# Patient Record
Sex: FEMALE | Race: WHITE | NOT HISPANIC OR LATINO | Employment: FULL TIME | URBAN - METROPOLITAN AREA
[De-identification: names, ages, dates, MRNs, and addresses within clinical notes are randomized per-mention and may not be internally consistent; named-entity substitution may affect disease eponyms.]

---

## 2023-05-13 ENCOUNTER — HOSPITAL ENCOUNTER (OUTPATIENT)
Dept: DATA CONVERSION | Facility: HOSPITAL | Age: 43
End: 2023-05-13

## 2023-06-19 PROBLEM — I10 HYPERTENSION, ESSENTIAL, BENIGN: Status: ACTIVE | Noted: 2023-06-19

## 2023-06-19 PROBLEM — N32.81 OAB (OVERACTIVE BLADDER): Status: RESOLVED | Noted: 2023-06-19 | Resolved: 2023-06-19

## 2023-06-19 PROBLEM — G56.03 BILATERAL CARPAL TUNNEL SYNDROME: Status: RESOLVED | Noted: 2023-06-19 | Resolved: 2023-06-19

## 2023-06-19 PROBLEM — M76.822 POSTERIOR TIBIAL TENDINITIS OF LEFT LEG: Status: ACTIVE | Noted: 2023-06-19

## 2023-06-19 PROBLEM — M25.539 PAIN, WRIST JOINT: Status: RESOLVED | Noted: 2023-06-19 | Resolved: 2023-06-19

## 2023-06-19 PROBLEM — M25.579 ANKLE PAIN: Status: RESOLVED | Noted: 2023-06-19 | Resolved: 2023-06-19

## 2023-06-19 PROBLEM — M24.075 LOOSE BODY IN LEFT ANKLE AND FOOT JOINT: Status: RESOLVED | Noted: 2023-06-19 | Resolved: 2023-06-19

## 2023-06-19 PROBLEM — N92.0 MENORRHAGIA: Status: ACTIVE | Noted: 2023-06-19

## 2023-06-19 PROBLEM — R11.0 NAUSEA: Status: RESOLVED | Noted: 2023-06-19 | Resolved: 2023-06-19

## 2023-06-19 PROBLEM — M25.472 EFFUSION OF LEFT ANKLE: Status: ACTIVE | Noted: 2023-06-19

## 2023-06-19 PROBLEM — M17.0 PRIMARY OSTEOARTHRITIS OF BOTH KNEES: Status: ACTIVE | Noted: 2023-06-19

## 2023-06-19 PROBLEM — E53.8 VITAMIN B12 DEFICIENCY: Status: ACTIVE | Noted: 2023-06-19

## 2023-06-19 PROBLEM — H52.13 MYOPIA OF BOTH EYES: Status: ACTIVE | Noted: 2023-06-19

## 2023-06-19 PROBLEM — N39.0 URINARY TRACT INFECTION: Status: RESOLVED | Noted: 2023-06-19 | Resolved: 2023-06-19

## 2023-06-19 PROBLEM — E83.42 HYPOMAGNESEMIA: Status: ACTIVE | Noted: 2023-06-19

## 2023-06-19 PROBLEM — E66.3 OVERWEIGHT: Status: ACTIVE | Noted: 2023-06-19

## 2023-06-19 PROBLEM — M24.072 LOOSE BODY IN LEFT ANKLE AND FOOT JOINT: Status: RESOLVED | Noted: 2023-06-19 | Resolved: 2023-06-19

## 2023-06-19 PROBLEM — N31.8 HYPERACTIVITY OF BLADDER: Status: ACTIVE | Noted: 2023-06-19

## 2023-06-19 PROBLEM — R07.9 CHEST PAIN: Status: RESOLVED | Noted: 2023-06-19 | Resolved: 2023-06-19

## 2023-06-19 PROBLEM — J20.9 ACUTE BRONCHITIS: Status: RESOLVED | Noted: 2023-06-19 | Resolved: 2023-06-19

## 2023-06-19 PROBLEM — J06.9 ACUTE UPPER RESPIRATORY INFECTION: Status: RESOLVED | Noted: 2023-06-19 | Resolved: 2023-06-19

## 2023-06-19 PROBLEM — E78.5 DYSLIPIDEMIA: Status: ACTIVE | Noted: 2023-06-19

## 2023-06-19 PROBLEM — R35.0 URINARY FREQUENCY: Status: RESOLVED | Noted: 2023-06-19 | Resolved: 2023-06-19

## 2023-06-19 PROBLEM — M25.872 ANKLE IMPINGEMENT SYNDROME, LEFT: Status: RESOLVED | Noted: 2023-06-19 | Resolved: 2023-06-19

## 2023-06-19 PROBLEM — R03.0 ELEVATED BLOOD PRESSURE READING: Status: ACTIVE | Noted: 2023-06-19

## 2023-06-19 PROBLEM — J01.10 ACUTE FRONTAL SINUSITIS: Status: RESOLVED | Noted: 2023-06-19 | Resolved: 2023-06-19

## 2023-06-19 PROBLEM — E55.9 VITAMIN D DEFICIENCY: Status: ACTIVE | Noted: 2023-06-19

## 2023-06-19 PROBLEM — M77.8 LEFT WRIST TENDINITIS: Status: ACTIVE | Noted: 2023-06-19

## 2023-06-19 PROBLEM — R53.83 FATIGUE: Status: ACTIVE | Noted: 2023-06-19

## 2023-06-19 RX ORDER — ATORVASTATIN CALCIUM 10 MG/1
1 TABLET, FILM COATED ORAL DAILY
COMMUNITY
Start: 2022-07-15 | End: 2024-02-22 | Stop reason: SDUPTHER

## 2023-06-19 RX ORDER — HYDROCHLOROTHIAZIDE 25 MG/1
1 TABLET ORAL DAILY
COMMUNITY
Start: 2020-10-27 | End: 2023-08-12 | Stop reason: ALTCHOICE

## 2023-06-19 RX ORDER — DROSPIRENONE 4 MG/1
1 TABLET, FILM COATED ORAL NIGHTLY
COMMUNITY
Start: 2020-10-27 | End: 2024-01-03

## 2023-07-20 ENCOUNTER — APPOINTMENT (OUTPATIENT)
Dept: PRIMARY CARE | Facility: CLINIC | Age: 43
End: 2023-07-20
Payer: COMMERCIAL

## 2023-08-10 ENCOUNTER — OFFICE VISIT (OUTPATIENT)
Dept: PRIMARY CARE | Facility: CLINIC | Age: 43
End: 2023-08-10
Payer: COMMERCIAL

## 2023-08-10 VITALS
HEIGHT: 62 IN | HEART RATE: 62 BPM | DIASTOLIC BLOOD PRESSURE: 86 MMHG | BODY MASS INDEX: 35.33 KG/M2 | SYSTOLIC BLOOD PRESSURE: 139 MMHG | WEIGHT: 192 LBS

## 2023-08-10 DIAGNOSIS — M70.42 PREPATELLAR BURSITIS OF BOTH KNEES: ICD-10-CM

## 2023-08-10 DIAGNOSIS — R03.0 ELEVATED BLOOD PRESSURE READING: ICD-10-CM

## 2023-08-10 DIAGNOSIS — Z12.31 VISIT FOR SCREENING MAMMOGRAM: ICD-10-CM

## 2023-08-10 DIAGNOSIS — M70.41 PREPATELLAR BURSITIS OF BOTH KNEES: ICD-10-CM

## 2023-08-10 DIAGNOSIS — N32.81 OVERACTIVE BLADDER: ICD-10-CM

## 2023-08-10 DIAGNOSIS — Z00.00 ROUTINE GENERAL MEDICAL EXAMINATION AT A HEALTH CARE FACILITY: Primary | ICD-10-CM

## 2023-08-10 PROCEDURE — 3075F SYST BP GE 130 - 139MM HG: CPT | Performed by: FAMILY MEDICINE

## 2023-08-10 PROCEDURE — 99396 PREV VISIT EST AGE 40-64: CPT | Performed by: FAMILY MEDICINE

## 2023-08-10 PROCEDURE — 3079F DIAST BP 80-89 MM HG: CPT | Performed by: FAMILY MEDICINE

## 2023-08-10 PROCEDURE — 1036F TOBACCO NON-USER: CPT | Performed by: FAMILY MEDICINE

## 2023-08-10 RX ORDER — LIDOCAINE 50 MG/G
2 PATCH TOPICAL DAILY
Qty: 60 PATCH | Refills: 2 | Status: SHIPPED | OUTPATIENT
Start: 2023-08-10 | End: 2024-08-09

## 2023-08-10 RX ORDER — MELOXICAM 15 MG/1
15 TABLET ORAL DAILY
Qty: 30 TABLET | Refills: 0 | Status: SHIPPED | OUTPATIENT
Start: 2023-08-10 | End: 2023-09-07

## 2023-08-10 RX ORDER — VALSARTAN 80 MG/1
80 TABLET ORAL DAILY
Qty: 90 TABLET | Refills: 1 | Status: SHIPPED | OUTPATIENT
Start: 2023-08-10 | End: 2024-01-25

## 2023-08-10 ASSESSMENT — PATIENT HEALTH QUESTIONNAIRE - PHQ9
1. LITTLE INTEREST OR PLEASURE IN DOING THINGS: NOT AT ALL
SUM OF ALL RESPONSES TO PHQ9 QUESTIONS 1 AND 2: 0
2. FEELING DOWN, DEPRESSED OR HOPELESS: NOT AT ALL

## 2023-08-11 ENCOUNTER — CLINICAL SUPPORT (OUTPATIENT)
Dept: PRIMARY CARE | Facility: CLINIC | Age: 43
End: 2023-08-11
Payer: COMMERCIAL

## 2023-08-11 DIAGNOSIS — E55.9 VITAMIN D DEFICIENCY: ICD-10-CM

## 2023-08-11 DIAGNOSIS — E78.5 DYSLIPIDEMIA: ICD-10-CM

## 2023-08-11 DIAGNOSIS — I10 HYPERTENSION, ESSENTIAL, BENIGN: ICD-10-CM

## 2023-08-11 DIAGNOSIS — Z00.00 HEALTH CARE MAINTENANCE: ICD-10-CM

## 2023-08-11 LAB
ALANINE AMINOTRANSFERASE (SGPT) (U/L) IN SER/PLAS: 18 U/L (ref 7–45)
ALBUMIN (G/DL) IN SER/PLAS: 4.3 G/DL (ref 3.4–5)
ALKALINE PHOSPHATASE (U/L) IN SER/PLAS: 53 U/L (ref 33–110)
ANION GAP IN SER/PLAS: 11 MMOL/L (ref 10–20)
ASPARTATE AMINOTRANSFERASE (SGOT) (U/L) IN SER/PLAS: 11 U/L (ref 9–39)
BILIRUBIN TOTAL (MG/DL) IN SER/PLAS: 0.5 MG/DL (ref 0–1.2)
CALCIDIOL (25 OH VITAMIN D3) (NG/ML) IN SER/PLAS: 30 NG/ML
CALCIUM (MG/DL) IN SER/PLAS: 9.4 MG/DL (ref 8.6–10.6)
CARBON DIOXIDE, TOTAL (MMOL/L) IN SER/PLAS: 27 MMOL/L (ref 21–32)
CHLORIDE (MMOL/L) IN SER/PLAS: 106 MMOL/L (ref 98–107)
CHOLESTEROL (MG/DL) IN SER/PLAS: 211 MG/DL (ref 0–199)
CHOLESTEROL IN HDL (MG/DL) IN SER/PLAS: 63.2 MG/DL
CHOLESTEROL/HDL RATIO: 3.3
CREATININE (MG/DL) IN SER/PLAS: 0.66 MG/DL (ref 0.5–1.05)
ERYTHROCYTE DISTRIBUTION WIDTH (RATIO) BY AUTOMATED COUNT: 13.8 % (ref 11.5–14.5)
ERYTHROCYTE MEAN CORPUSCULAR HEMOGLOBIN CONCENTRATION (G/DL) BY AUTOMATED: 31.5 G/DL (ref 32–36)
ERYTHROCYTE MEAN CORPUSCULAR VOLUME (FL) BY AUTOMATED COUNT: 99 FL (ref 80–100)
ERYTHROCYTES (10*6/UL) IN BLOOD BY AUTOMATED COUNT: 4.53 X10E12/L (ref 4–5.2)
GFR FEMALE: >90 ML/MIN/1.73M2
GLUCOSE (MG/DL) IN SER/PLAS: 87 MG/DL (ref 74–99)
HEMATOCRIT (%) IN BLOOD BY AUTOMATED COUNT: 44.7 % (ref 36–46)
HEMOGLOBIN (G/DL) IN BLOOD: 14.1 G/DL (ref 12–16)
LDL: 125 MG/DL (ref 0–99)
LEUKOCYTES (10*3/UL) IN BLOOD BY AUTOMATED COUNT: 6.4 X10E9/L (ref 4.4–11.3)
NRBC (PER 100 WBCS) BY AUTOMATED COUNT: 0 /100 WBC (ref 0–0)
PLATELETS (10*3/UL) IN BLOOD AUTOMATED COUNT: 323 X10E9/L (ref 150–450)
POTASSIUM (MMOL/L) IN SER/PLAS: 4.3 MMOL/L (ref 3.5–5.3)
PROTEIN TOTAL: 7.1 G/DL (ref 6.4–8.2)
SODIUM (MMOL/L) IN SER/PLAS: 140 MMOL/L (ref 136–145)
THYROTROPIN (MIU/L) IN SER/PLAS BY DETECTION LIMIT <= 0.05 MIU/L: 1.82 MIU/L (ref 0.44–3.98)
TRIGLYCERIDE (MG/DL) IN SER/PLAS: 115 MG/DL (ref 0–149)
UREA NITROGEN (MG/DL) IN SER/PLAS: 13 MG/DL (ref 6–23)
VLDL: 23 MG/DL (ref 0–40)

## 2023-08-11 PROCEDURE — 80053 COMPREHEN METABOLIC PANEL: CPT

## 2023-08-11 PROCEDURE — 80061 LIPID PANEL: CPT

## 2023-08-11 PROCEDURE — 84443 ASSAY THYROID STIM HORMONE: CPT

## 2023-08-11 PROCEDURE — 85027 COMPLETE CBC AUTOMATED: CPT

## 2023-08-11 PROCEDURE — 82306 VITAMIN D 25 HYDROXY: CPT

## 2023-08-12 PROBLEM — N32.81 OVERACTIVE BLADDER: Status: ACTIVE | Noted: 2023-08-12

## 2023-08-12 PROBLEM — Z12.31 VISIT FOR SCREENING MAMMOGRAM: Status: ACTIVE | Noted: 2023-08-12

## 2023-08-12 PROBLEM — M70.42 PREPATELLAR BURSITIS OF BOTH KNEES: Status: ACTIVE | Noted: 2023-08-12

## 2023-08-12 PROBLEM — M70.41 PREPATELLAR BURSITIS OF BOTH KNEES: Status: ACTIVE | Noted: 2023-08-12

## 2023-08-12 PROBLEM — Z00.00 ROUTINE GENERAL MEDICAL EXAMINATION AT A HEALTH CARE FACILITY: Status: ACTIVE | Noted: 2023-08-12

## 2023-08-12 NOTE — PROGRESS NOTES
"  Subjective     Patient ID: Keiry Kline is a 43 y.o. female who presents for Annual Exam.      Last Physical :  1Years ago     Pt's PMH, PSH, SH, FH , meds and allergies was obtained / reviewed and updated .     Dental visits : Y  Vision issues : N  Hearing issues : N    Immunizations : Y    Diet :  could be better  Exercise:  Weight concerns : yes    Alcohol: as noted in SH  Tobacco: as noted in SH  Recreational drug use : None/ as noted in SH    Sexually active : Active   Contraception :   Menstrual problems:  Premenopausal/perimenopausal/ postmenopausal:    G:  Parity:  Full term:    Premature:   (s):   Living :  Ab induced:   Ab spontaneous :  Ectopic :   Multiple :    PAP smear :  Mammogram : Ordered  Colonoscopy:    Metabolic screening   - Lipid   - Glucose  ======================================================    Visit Vitals  Blood Pressure 139/86   Pulse 62   Height 1.575 m (5' 2\")   Weight 87.1 kg (192 lb)   Body Mass Index 35.12 kg/m²   Smoking Status Never   Body Surface Area 1.95 m²      No LMP recorded.     =====================================    Review of systems:  Constitutional: no chills, no fever and no night sweats.     Eyes: no blurred vision and no eyesight problems.     ENT: no hearing loss, no nasal congestion, no nasal discharge, no hoarseness and no sore throat.     Cardiovascular: no chest pain, no intermittent leg claudication, no lower extremity edema, no palpitations and no syncope.     Respiratory: no cough, no shortness of breath during exertion, no shortness of breath at rest and no wheezing.     Gastrointestinal: no abdominal pain, no blood in stools, no constipation, no diarrhea, no melena, no nausea, no rectal pain and no vomiting.     Genitourinary: no dysuria, no change in urinary frequency, no urinary hesitancy, no feelings of urinary urgency and no vaginal discharge.     Musculoskeletal: no arthralgias, no back pain and no myalgias.     Integumentary: no new " skin lesions and no rashes.     Neurological: no difficulty walking, no headache, no limb weakness, no numbness and no tingling.     Psychiatric: no anxiety, no depression, no anhedonia and no substance use disorders.   ============================================================    Physical exam :    Constitutional: Alert and in no acute distress. Well developed, well nourished.     Eyes: Normal external exam. Pupils were equal in size, round, reactive to light (PERRL) with normal accommodation and extraocular movements intact (EOMI).     Ears, Nose, Mouth, and Throat: External inspection of ears and nose: Normal.  Otoscopic examination: Normal.      Neck: No neck mass was observed. Supple.     Cardiovascular: Heart rate and rhythm were normal, normal S1 and S2, no gallops, no murmurs and no pericardial rub    Pulmonary: No respiratory distress. Clear bilateral breath sounds.     Abdomen: Soft nontender; no abdominal mass palpated. No organomegaly.     Musculoskeletal: No joint swelling seen, normal movements of all extremities. Range of motion: Normal.  Muscle strength/tone: Normal.      Skin: Normal skin color and pigmentation, normal skin turgor, and no rash.     Neurologic: Deep tendon reflexes were 2+ and symmetric. Sensation: Normal.     Psychiatric: Judgment and insight: Intact. Mood and affect: Normal.    Lymphatic : Cervical/ axillary/ groin Lns Palpable/ non palpable     Endocrine: no recent weight gain and no recent weight loss.     Hematologic/Lymphatic: no tendency for easy bruising and no swollen glands.          All other systems have been reviewed and are negative for complaint.      Assessment/Plan    Problem List Items Addressed This Visit       Elevated blood pressure reading    Relevant Medications    valsartan (Diovan) 80 mg tablet    Visit for screening mammogram    Relevant Orders    BI mammo bilateral screening tomosynthesis    Prepatellar bursitis of both knees    Relevant Medications     meloxicam (Mobic) 15 mg tablet    lidocaine (Lidoderm) 5 % patch    Routine general medical examination at a health care facility - Primary     -Nutrition: Stressed importance of moderation in sodium/caffeine intake, saturated fat and cholesterol, caloric balance, sufficient intake of fresh fruits, vegetables, fiber, ---Exercise: Stressed the importance of regular exercise.   --Injury prevention: Discussed safety belts, safety helmets, smoke detector, smoking near bedding or upholstery.   --Dental health: Discussed importance of regular tooth brushing, flossing, and dental visits.  --Immunizations reviewed.  --Discussed benefits of screening colonoscopy.  --After hours service discussed with patient           Overactive bladder     Patient was given samples of Myrbetriq 25 mg daily for 2 weeks and then 50 mg daily

## 2023-08-14 ENCOUNTER — TELEPHONE (OUTPATIENT)
Dept: PRIMARY CARE | Facility: CLINIC | Age: 43
End: 2023-08-14
Payer: COMMERCIAL

## 2023-08-23 ENCOUNTER — TELEPHONE (OUTPATIENT)
Dept: PRIMARY CARE | Facility: CLINIC | Age: 43
End: 2023-08-23
Payer: COMMERCIAL

## 2023-09-07 DIAGNOSIS — M70.42 PREPATELLAR BURSITIS OF BOTH KNEES: ICD-10-CM

## 2023-09-07 DIAGNOSIS — M70.41 PREPATELLAR BURSITIS OF BOTH KNEES: ICD-10-CM

## 2023-09-07 RX ORDER — MELOXICAM 15 MG/1
15 TABLET ORAL DAILY
Qty: 90 TABLET | Refills: 0 | Status: SHIPPED | OUTPATIENT
Start: 2023-09-07 | End: 2023-11-13

## 2023-10-05 ENCOUNTER — ANCILLARY PROCEDURE (OUTPATIENT)
Dept: RADIOLOGY | Facility: CLINIC | Age: 43
End: 2023-10-05
Payer: COMMERCIAL

## 2023-10-05 DIAGNOSIS — Z12.31 VISIT FOR SCREENING MAMMOGRAM: ICD-10-CM

## 2023-10-05 PROCEDURE — 77067 SCR MAMMO BI INCL CAD: CPT | Mod: BILATERAL PROCEDURE | Performed by: RADIOLOGY

## 2023-10-05 PROCEDURE — 77063 BREAST TOMOSYNTHESIS BI: CPT | Mod: BILATERAL PROCEDURE | Performed by: RADIOLOGY

## 2023-10-05 PROCEDURE — 77067 SCR MAMMO BI INCL CAD: CPT | Mod: 50

## 2023-10-20 ENCOUNTER — DOCUMENTATION (OUTPATIENT)
Dept: OBSTETRICS AND GYNECOLOGY | Facility: CLINIC | Age: 43
End: 2023-10-20
Payer: COMMERCIAL

## 2023-10-20 ENCOUNTER — TELEPHONE (OUTPATIENT)
Dept: OBSTETRICS AND GYNECOLOGY | Facility: CLINIC | Age: 43
End: 2023-10-20
Payer: COMMERCIAL

## 2023-10-20 NOTE — PROGRESS NOTES
I attempted to place prior auth but need to wait for request from [employer] as my information is not letting me send. Message comes up as patient not found. [employer] rep said they would resend request via fax.

## 2023-10-20 NOTE — TELEPHONE ENCOUNTER
Called [facility] pharmacy at [phone] about prior auth. To verify if it is for rx from 09/2023 or is this for a new rx for pt.  Message left for return call. Pt sent to scheduling for appt last annual was 10/2022, new appt need for further rx after the rx sent 09/2023.

## 2023-10-24 ENCOUNTER — TELEPHONE (OUTPATIENT)
Dept: OBSTETRICS AND GYNECOLOGY | Facility: CLINIC | Age: 43
End: 2023-10-24
Payer: COMMERCIAL

## 2023-10-24 NOTE — TELEPHONE ENCOUNTER
contacted pt  name and  verified  Spoke with pt made aware samples at desk  Prior auth in process  pt verbalized understanding  no further questions or concerns at this time

## 2023-10-24 NOTE — TELEPHONE ENCOUNTER
Called pt, no answer, left voicemail for return call  Dr. Thad lai with pt receiving samples until prior authorization situation is resolved  To get her to appt

## 2023-11-13 DIAGNOSIS — M70.41 PREPATELLAR BURSITIS OF BOTH KNEES: ICD-10-CM

## 2023-11-13 DIAGNOSIS — M70.42 PREPATELLAR BURSITIS OF BOTH KNEES: ICD-10-CM

## 2023-11-13 RX ORDER — MELOXICAM 15 MG/1
15 TABLET ORAL DAILY
Qty: 90 TABLET | Refills: 0 | Status: SHIPPED | OUTPATIENT
Start: 2023-11-13 | End: 2024-02-22 | Stop reason: SDUPTHER

## 2023-11-20 ENCOUNTER — DOCUMENTATION (OUTPATIENT)
Dept: PHYSICAL THERAPY | Facility: CLINIC | Age: 43
End: 2023-11-20
Payer: COMMERCIAL

## 2023-11-20 NOTE — PROGRESS NOTES
Physical Therapy    Discharge Summary    Name: Keiry Kline  MRN: [id number]  : 1980  Date: 23    Discharge Summary: PT    Discharge Information: Date of discharge 23, Date of last visit 23, Date of evaluation 23, Number of attended visits 3, Referred by Jatinder Torrez PA-c, and Referred for B knee pain.    Therapy Summary: Patient was evaluated and given exercises to address c/o B knee pain.    Discharge Status: At last visit in August we discussed alternating pool and land exercises.     Rehab Discharge Reason: Failed to schedule and/or keep follow-up appointment(s)

## 2023-12-21 DIAGNOSIS — Z30.41 ENCOUNTER FOR SURVEILLANCE OF CONTRACEPTIVE PILLS: Primary | ICD-10-CM

## 2023-12-29 ENCOUNTER — APPOINTMENT (OUTPATIENT)
Dept: OBSTETRICS AND GYNECOLOGY | Facility: HOSPITAL | Age: 43
End: 2023-12-29
Payer: COMMERCIAL

## 2024-01-03 RX ORDER — DROSPIRENONE 4 MG/1
TABLET, FILM COATED ORAL
Qty: 84 TABLET | Refills: 0 | Status: SHIPPED | OUTPATIENT
Start: 2024-01-03

## 2024-01-25 DIAGNOSIS — R03.0 ELEVATED BLOOD PRESSURE READING: ICD-10-CM

## 2024-01-25 RX ORDER — VALSARTAN 80 MG/1
80 TABLET ORAL DAILY
Qty: 90 TABLET | Refills: 1 | Status: SHIPPED | OUTPATIENT
Start: 2024-01-25

## 2024-02-09 ENCOUNTER — OFFICE VISIT (OUTPATIENT)
Dept: OBSTETRICS AND GYNECOLOGY | Facility: HOSPITAL | Age: 44
End: 2024-02-09
Payer: COMMERCIAL

## 2024-02-09 VITALS
SYSTOLIC BLOOD PRESSURE: 122 MMHG | WEIGHT: 187 LBS | BODY MASS INDEX: 34.41 KG/M2 | RESPIRATION RATE: 12 BRPM | DIASTOLIC BLOOD PRESSURE: 80 MMHG | HEART RATE: 84 BPM | HEIGHT: 62 IN | OXYGEN SATURATION: 99 %

## 2024-02-09 DIAGNOSIS — Z30.41 ENCOUNTER FOR SURVEILLANCE OF CONTRACEPTIVE PILLS: Primary | ICD-10-CM

## 2024-02-09 DIAGNOSIS — Z12.4 SCREENING FOR MALIGNANT NEOPLASM OF CERVIX: ICD-10-CM

## 2024-02-09 DIAGNOSIS — Z01.419 WELL WOMAN EXAM WITH ROUTINE GYNECOLOGICAL EXAM: ICD-10-CM

## 2024-02-09 DIAGNOSIS — Z12.31 ENCOUNTER FOR SCREENING MAMMOGRAM FOR MALIGNANT NEOPLASM OF BREAST: ICD-10-CM

## 2024-02-09 PROCEDURE — 99396 PREV VISIT EST AGE 40-64: CPT | Performed by: OBSTETRICS & GYNECOLOGY

## 2024-02-09 PROCEDURE — 1036F TOBACCO NON-USER: CPT | Performed by: OBSTETRICS & GYNECOLOGY

## 2024-02-09 PROCEDURE — 3074F SYST BP LT 130 MM HG: CPT | Performed by: OBSTETRICS & GYNECOLOGY

## 2024-02-09 PROCEDURE — 3079F DIAST BP 80-89 MM HG: CPT | Performed by: OBSTETRICS & GYNECOLOGY

## 2024-02-09 PROCEDURE — 88175 CYTOPATH C/V AUTO FLUID REDO: CPT | Mod: TC | Performed by: OBSTETRICS & GYNECOLOGY

## 2024-02-09 PROCEDURE — 87624 HPV HI-RISK TYP POOLED RSLT: CPT | Performed by: OBSTETRICS & GYNECOLOGY

## 2024-02-09 SDOH — ECONOMIC STABILITY: HOUSING INSECURITY
IN THE LAST 12 MONTHS, WAS THERE A TIME WHEN YOU DID NOT HAVE A STEADY PLACE TO SLEEP OR SLEPT IN A SHELTER (INCLUDING NOW)?: NO

## 2024-02-09 SDOH — ECONOMIC STABILITY: INCOME INSECURITY: IN THE LAST 12 MONTHS, WAS THERE A TIME WHEN YOU WERE NOT ABLE TO PAY THE MORTGAGE OR RENT ON TIME?: NO

## 2024-02-09 SDOH — ECONOMIC STABILITY: FOOD INSECURITY: WITHIN THE PAST 12 MONTHS, THE FOOD YOU BOUGHT JUST DIDN'T LAST AND YOU DIDN'T HAVE MONEY TO GET MORE.: NEVER TRUE

## 2024-02-09 SDOH — ECONOMIC STABILITY: FOOD INSECURITY: WITHIN THE PAST 12 MONTHS, YOU WORRIED THAT YOUR FOOD WOULD RUN OUT BEFORE YOU GOT MONEY TO BUY MORE.: NEVER TRUE

## 2024-02-09 ASSESSMENT — ENCOUNTER SYMPTOMS
ALLERGIC/IMMUNOLOGIC NEGATIVE: 0
MUSCULOSKELETAL NEGATIVE: 0
EYES NEGATIVE: 0
RESPIRATORY NEGATIVE: 0
ENDOCRINE NEGATIVE: 0
GASTROINTESTINAL NEGATIVE: 0
HEMATOLOGIC/LYMPHATIC NEGATIVE: 0
PSYCHIATRIC NEGATIVE: 0
CARDIOVASCULAR NEGATIVE: 0
NEUROLOGICAL NEGATIVE: 0
CONSTITUTIONAL NEGATIVE: 0

## 2024-02-09 ASSESSMENT — PAIN SCALES - GENERAL: PAINLEVEL: 0-NO PAIN

## 2024-02-09 ASSESSMENT — LIFESTYLE VARIABLES
HOW MANY STANDARD DRINKS CONTAINING ALCOHOL DO YOU HAVE ON A TYPICAL DAY: PATIENT DOES NOT DRINK
HOW OFTEN DO YOU HAVE SIX OR MORE DRINKS ON ONE OCCASION: NEVER
HOW OFTEN DO YOU HAVE A DRINK CONTAINING ALCOHOL: NEVER
AUDIT-C TOTAL SCORE: 0
SKIP TO QUESTIONS 9-10: 1

## 2024-02-09 ASSESSMENT — PATIENT HEALTH QUESTIONNAIRE - PHQ9
2. FEELING DOWN, DEPRESSED OR HOPELESS: NOT AT ALL
SUM OF ALL RESPONSES TO PHQ9 QUESTIONS 1 AND 2: 0
1. LITTLE INTEREST OR PLEASURE IN DOING THINGS: NOT AT ALL

## 2024-02-09 NOTE — PROGRESS NOTES
Subjective   Patient ID: Keiry Kline is a 44 y.o. female who presents for Annual Exam (Last pap 7/29/19/Last mamm 10/5/2023/).  Pt needs a PAP smear  Pt has irregular menses      Review of Systems   All other systems reviewed and are negative.      Objective   Physical Exam  Constitutional:       Appearance: She is obese.   Pulmonary:      Effort: Pulmonary effort is normal.   Genitourinary:     General: Normal vulva.      Vagina: Normal.      Cervix: Normal.      Uterus: Normal.       Adnexa: Right adnexa normal and left adnexa normal.      Rectum: Normal.   Musculoskeletal:      Cervical back: Neck supple.   Skin:     General: Skin is warm.   Psychiatric:         Mood and Affect: Mood normal.         Behavior: Behavior normal.         Assessment/Plan   Follo wDiagnoses and all orders for this visit:  Encounter for surveillance of contraceptive pills  -     drospirenone, contraceptive, 4 mg (28) tablet; Take 1 tablet by mouth once daily.  Screening for malignant neoplasm of cervix  -     THINPREP PAP TEST  Encounter for screening mammogram for malignant neoplasm of breast  -     BI mammo bilateral screening tomosynthesis; Future  Well woman exam with routine gynecological exam       Follow up  in 12 months     Cherelle Oneil MD 02/09/24 10:42 AM

## 2024-02-22 ENCOUNTER — OFFICE VISIT (OUTPATIENT)
Dept: PRIMARY CARE | Facility: CLINIC | Age: 44
End: 2024-02-22
Payer: COMMERCIAL

## 2024-02-22 VITALS
WEIGHT: 194 LBS | HEART RATE: 56 BPM | DIASTOLIC BLOOD PRESSURE: 81 MMHG | SYSTOLIC BLOOD PRESSURE: 124 MMHG | BODY MASS INDEX: 35.7 KG/M2 | HEIGHT: 62 IN

## 2024-02-22 DIAGNOSIS — M70.41 PREPATELLAR BURSITIS OF BOTH KNEES: ICD-10-CM

## 2024-02-22 DIAGNOSIS — E78.5 DYSLIPIDEMIA: ICD-10-CM

## 2024-02-22 DIAGNOSIS — N32.81 OVERACTIVE BLADDER: Primary | ICD-10-CM

## 2024-02-22 DIAGNOSIS — I10 HYPERTENSION, ESSENTIAL, BENIGN: ICD-10-CM

## 2024-02-22 DIAGNOSIS — Z23 NEED FOR IMMUNIZATION AGAINST INFLUENZA: ICD-10-CM

## 2024-02-22 DIAGNOSIS — M70.42 PREPATELLAR BURSITIS OF BOTH KNEES: ICD-10-CM

## 2024-02-22 PROCEDURE — 1036F TOBACCO NON-USER: CPT | Performed by: FAMILY MEDICINE

## 2024-02-22 PROCEDURE — 90471 IMMUNIZATION ADMIN: CPT | Performed by: FAMILY MEDICINE

## 2024-02-22 PROCEDURE — 90686 IIV4 VACC NO PRSV 0.5 ML IM: CPT | Performed by: FAMILY MEDICINE

## 2024-02-22 PROCEDURE — 99214 OFFICE O/P EST MOD 30 MIN: CPT | Performed by: FAMILY MEDICINE

## 2024-02-22 PROCEDURE — 3079F DIAST BP 80-89 MM HG: CPT | Performed by: FAMILY MEDICINE

## 2024-02-22 PROCEDURE — 3074F SYST BP LT 130 MM HG: CPT | Performed by: FAMILY MEDICINE

## 2024-02-22 RX ORDER — MELOXICAM 15 MG/1
15 TABLET ORAL DAILY
Qty: 90 TABLET | Refills: 0 | Status: SHIPPED | OUTPATIENT
Start: 2024-02-22

## 2024-02-22 RX ORDER — SOLIFENACIN SUCCINATE 10 MG/1
10 TABLET, FILM COATED ORAL DAILY
Qty: 90 TABLET | Refills: 1 | Status: SHIPPED | OUTPATIENT
Start: 2024-02-22 | End: 2025-02-21

## 2024-02-22 RX ORDER — ATORVASTATIN CALCIUM 10 MG/1
10 TABLET, FILM COATED ORAL DAILY
Qty: 90 TABLET | Refills: 1 | Status: SHIPPED | OUTPATIENT
Start: 2024-02-22 | End: 2025-02-21

## 2024-02-22 ASSESSMENT — ENCOUNTER SYMPTOMS
DEPRESSION: 0
LOSS OF SENSATION IN FEET: 0
OCCASIONAL FEELINGS OF UNSTEADINESS: 0

## 2024-02-22 ASSESSMENT — PATIENT HEALTH QUESTIONNAIRE - PHQ9
SUM OF ALL RESPONSES TO PHQ9 QUESTIONS 1 AND 2: 0
2. FEELING DOWN, DEPRESSED OR HOPELESS: NOT AT ALL
1. LITTLE INTEREST OR PLEASURE IN DOING THINGS: NOT AT ALL

## 2024-02-22 NOTE — PROGRESS NOTES
"Subjective   Patient ID: Keiry Kline is a 44 y.o. female who presents for Follow-up.      HPI patient is here for follow-up  Continues to have urinary incontinence medication not helping  Has been taking meloxicam every day knees are getting better  Tolerating statin  Current Outpatient Medications on File Prior to Visit   Medication Sig Dispense Refill    atorvastatin (Lipitor) 10 mg tablet Take 1 tablet (10 mg) by mouth once daily.      drospirenone, contraceptive, 4 mg (28) tablet Take 1 tablet by mouth once daily. 1 tablet 1    lidocaine (Lidoderm) 5 % patch Place 2 patches over 12 hours on the skin once daily. Apply to painful area 12 hours per day, remove for 12 hours. 60 patch 2    meloxicam (Mobic) 15 mg tablet TAKE 1 TABLET(15 MG) BY MOUTH EVERY DAY 90 tablet 0    Slynd 4 mg (28) tablet TAKE ONE (1) TABLET BY MOUTH EVERYDAY AT BEDTIME 84 tablet 0    valsartan (Diovan) 80 mg tablet TAKE 1 TABLET(80 MG) BY MOUTH EVERY DAY 90 tablet 1    [DISCONTINUED] oxybutynin XL (Ditropan-XL) 5 mg 24 hr tablet Take 1 tablet (5 mg) by mouth once daily. 90 tablet 1     No current facility-administered medications on file prior to visit.        Review of Systems   Constitutional:  Negative for chills and fever.   HENT: Negative.     Respiratory: Negative.     Cardiovascular: Negative.    Gastrointestinal: Negative.  Negative for nausea and vomiting.   Endocrine: Negative.    Genitourinary: Negative.    Musculoskeletal: Negative.    Skin: Negative.  Negative for rash.   Allergic/Immunologic: Negative.    Neurological: Negative.    Hematological: Negative.    Psychiatric/Behavioral: Negative.     All other systems reviewed and are negative.      Objective   Blood Pressure 124/81   Pulse 56   Height 1.575 m (5' 2\")   Weight 88 kg (194 lb)   Last Menstrual Period  (LMP Unknown)   Body Mass Index 35.48 kg/m²   BSA: 1.96 meters squared  Growth percentiles: Facility age limit for growth %angela is 20 years. Facility age " limit for growth %angela is 20 years.   No visits with results within 1 Week(s) from this visit.   Latest known visit with results is:   Clinical Support on 08/11/2023   Component Date Value Ref Range Status    Glucose 08/11/2023 87  74 - 99 mg/dL Final    Sodium 08/11/2023 140  136 - 145 mmol/L Final    Potassium 08/11/2023 4.3  3.5 - 5.3 mmol/L Final    Chloride 08/11/2023 106  98 - 107 mmol/L Final    Bicarbonate 08/11/2023 27  21 - 32 mmol/L Final    Anion Gap 08/11/2023 11  10 - 20 mmol/L Final    Urea Nitrogen 08/11/2023 13  6 - 23 mg/dL Final    Creatinine 08/11/2023 0.66  0.50 - 1.05 mg/dL Final    GFR Female 08/11/2023 >90  >90 mL/min/1.73m2 Final     CALCULATIONS OF ESTIMATED GFR ARE PERFORMED   USING THE 2021 CKD-EPI STUDY REFIT EQUATION   WITHOUT THE RACE VARIABLE FOR THE IDMS-TRACEABLE   CREATININE METHODS.    https://jasn.asnjournals.org/content/early/2021/09/22/ASN.2472312549    Calcium 08/11/2023 9.4  8.6 - 10.6 mg/dL Final    Albumin 08/11/2023 4.3  3.4 - 5.0 g/dL Final    Alkaline Phosphatase 08/11/2023 53  33 - 110 U/L Final    Total Protein 08/11/2023 7.1  6.4 - 8.2 g/dL Final    AST 08/11/2023 11  9 - 39 U/L Final    Total Bilirubin 08/11/2023 0.5  0.0 - 1.2 mg/dL Final    ALT (SGPT) 08/11/2023 18  7 - 45 U/L Final     Patients treated with Sulfasalazine may generate    falsely decreased results for ALT.    Cholesterol 08/11/2023 211 (H)  0 - 199 mg/dL Final    .      AGE      DESIRABLE   BORDERLINE HIGH   HIGH     0-19 Y     0 - 169       170 - 199     >/= 200    20-24 Y     0 - 189       190 - 224     >/= 225         >24 Y     0 - 199       200 - 239     >/= 240   **All ranges are based on fasting samples. Specific   therapeutic targets will vary based on patient-specific   cardiac risk.  .   Pediatric guidelines reference:Pediatrics 2011, 128(S5).   Adult guidelines reference: NCEP ATPIII Guidelines,     THERON 2001, 258:2486-86  .   Venipuncture immediately after or during the    administration  of Metamizole may lead to falsely   low results. Testing should be performed immediately   prior to Metamizole dosing.    HDL 08/11/2023 63.2  mg/dL Final    .      AGE      VERY LOW   LOW     NORMAL    HIGH       0-19 Y       < 35   < 40     40-45     ----    20-24 Y       ----   < 40       >45     ----      >24 Y       ----   < 40     40-60      >60  .    Cholesterol/HDL Ratio 08/11/2023 3.3   Final    REF VALUES  DESIRABLE  < 3.4  HIGH RISK  > 5.0    LDL 08/11/2023 125 (H)  0 - 99 mg/dL Final    .                           NEAR      BORD      AGE      DESIRABLE  OPTIMAL    HIGH     HIGH     VERY HIGH     0-19 Y     0 - 109     ---    110-129   >/= 130     ----    20-24 Y     0 - 119     ---    120-159   >/= 160     ----      >24 Y     0 -  99   100-129  130-159   160-189     >/=190  .    VLDL 08/11/2023 23  0 - 40 mg/dL Final    Triglycerides 08/11/2023 115  0 - 149 mg/dL Final    .      AGE      DESIRABLE   BORDERLINE HIGH   HIGH     VERY HIGH   0 D-90 D    19 - 174         ----         ----        ----  91 D- 9 Y     0 -  74        75 -  99     >/= 100      ----    10-19 Y     0 -  89        90 - 129     >/= 130      ----    20-24 Y     0 - 114       115 - 149     >/= 150      ----         >24 Y     0 - 149       150 - 199    200- 499    >/= 500  .   Venipuncture immediately after or during the    administration of Metamizole may lead to falsely   low results. Testing should be performed immediately   prior to Metamizole dosing.    WBC 08/11/2023 6.4  4.4 - 11.3 x10E9/L Final    nRBC 08/11/2023 0.0  0.0 - 0.0 /100 WBC Final    RBC 08/11/2023 4.53  4.00 - 5.20 x10E12/L Final    Hemoglobin 08/11/2023 14.1  12.0 - 16.0 g/dL Final    Hematocrit 08/11/2023 44.7  36.0 - 46.0 % Final    MCV 08/11/2023 99  80 - 100 fL Final    MCHC 08/11/2023 31.5 (L)  32.0 - 36.0 g/dL Final    Platelets 08/11/2023 323  150 - 450 x10E9/L Final    RDW 08/11/2023 13.8  11.5 - 14.5 % Final    TSH 08/11/2023 1.82  0.44 - 3.98 mIU/L Final      TSH testing is performed using different testing    methodology at [facility] than at other    [facility] hospitals. Direct result comparisons should    only be made within the same method.    Vitamin D, 25-Hydroxy 08/11/2023 30  ng/mL Final    .  DEFICIENCY:         < 20   NG/ML  INSUFFICIENCY:      20-29  NG/ML  SUFFICIENCY:         NG/ML    THIS ASSAY ACCURATELY QUANTIFIES THE SUM OF  VITAMIN D3, 25-HYDROXY AND VIT D2,25-HYDROXY.      Physical Exam  Constitutional:       General: She is not in acute distress.  Eyes:      Extraocular Movements: Extraocular movements intact.   Cardiovascular:      Rate and Rhythm: Normal rate and regular rhythm.   Pulmonary:      Breath sounds: Normal breath sounds.   Abdominal:      General: Bowel sounds are normal.   Musculoskeletal:         General: Swelling present.      Cervical back: No rigidity.   Skin:     Findings: No rash.   Neurological:      General: No focal deficit present.      Mental Status: She is alert.   Psychiatric:         Thought Content: Thought content normal.         Assessment/Plan   Problem List Items Addressed This Visit             ICD-10-CM    Dyslipidemia E78.5    Relevant Medications    atorvastatin (Lipitor) 10 mg tablet    Hypertension, essential, benign I10     C/w meds         Prepatellar bursitis of both knees M70.41, M70.42    Relevant Medications    meloxicam (Mobic) 15 mg tablet    Overactive bladder - Primary N32.81    Relevant Medications    solifenacin (Vesicare) 10 mg tablet    Need for immunization against influenza Z23    Relevant Orders    Flu vaccine (IIV4) age 6 months and greater, preservative free (Completed)

## 2024-02-24 PROBLEM — Z23 NEED FOR IMMUNIZATION AGAINST INFLUENZA: Status: ACTIVE | Noted: 2024-02-24

## 2024-02-24 ASSESSMENT — ENCOUNTER SYMPTOMS
FEVER: 0
HEMATOLOGIC/LYMPHATIC NEGATIVE: 1
RESPIRATORY NEGATIVE: 1
MUSCULOSKELETAL NEGATIVE: 1
ENDOCRINE NEGATIVE: 1
CARDIOVASCULAR NEGATIVE: 1
NEUROLOGICAL NEGATIVE: 1
ALLERGIC/IMMUNOLOGIC NEGATIVE: 1
GASTROINTESTINAL NEGATIVE: 1
VOMITING: 0
PSYCHIATRIC NEGATIVE: 1
CHILLS: 0
NAUSEA: 0

## 2024-02-27 LAB
CYTOLOGY CMNT CVX/VAG CYTO-IMP: NORMAL
HPV HR 12 DNA GENITAL QL NAA+PROBE: NEGATIVE
HPV HR GENOTYPES PNL CVX NAA+PROBE: NEGATIVE
HPV16 DNA SPEC QL NAA+PROBE: NEGATIVE
HPV18 DNA SPEC QL NAA+PROBE: NEGATIVE
LAB AP HPV GENOTYPE QUESTION: YES
LAB AP HPV HR: NORMAL
LABORATORY COMMENT REPORT: NORMAL
PATH REPORT.TOTAL CANCER: NORMAL

## 2024-04-05 ENCOUNTER — TELEPHONE (OUTPATIENT)
Dept: OBSTETRICS AND GYNECOLOGY | Facility: CLINIC | Age: 44
End: 2024-04-05
Payer: COMMERCIAL

## 2024-04-08 NOTE — TELEPHONE ENCOUNTER
LM on pt's VM-Slynd sample available for p/up at [facility].  Advised pt contact office with further questions.    Mom called to schedule pt's EEG. Mom confirmed understanding of date and time

## 2024-08-06 ENCOUNTER — TELEPHONE (OUTPATIENT)
Dept: OBSTETRICS AND GYNECOLOGY | Facility: HOSPITAL | Age: 44
End: 2024-08-06
Payer: COMMERCIAL

## 2024-08-06 DIAGNOSIS — Z30.41 ENCOUNTER FOR SURVEILLANCE OF CONTRACEPTIVE PILLS: ICD-10-CM

## 2024-08-07 RX ORDER — DROSPIRENONE 4 MG/1
1 TABLET, FILM COATED ORAL NIGHTLY
Qty: 84 TABLET | Refills: 3 | Status: SHIPPED | OUTPATIENT
Start: 2024-08-07 | End: 2025-08-07

## 2024-11-01 ENCOUNTER — APPOINTMENT (OUTPATIENT)
Dept: OBSTETRICS AND GYNECOLOGY | Facility: HOSPITAL | Age: 44
End: 2024-11-01
Payer: COMMERCIAL

## 2024-12-11 ENCOUNTER — APPOINTMENT (OUTPATIENT)
Dept: ORTHOPEDIC SURGERY | Facility: CLINIC | Age: 44
End: 2024-12-11
Payer: COMMERCIAL

## 2024-12-11 ENCOUNTER — HOSPITAL ENCOUNTER (OUTPATIENT)
Dept: RADIOLOGY | Facility: CLINIC | Age: 44
Discharge: HOME | End: 2024-12-11
Payer: COMMERCIAL

## 2024-12-11 VITALS — HEIGHT: 62 IN | BODY MASS INDEX: 35.7 KG/M2 | WEIGHT: 194 LBS

## 2024-12-11 DIAGNOSIS — M25.561 ACUTE BILATERAL KNEE PAIN: ICD-10-CM

## 2024-12-11 DIAGNOSIS — M17.0 ARTHRITIS OF BOTH KNEES: ICD-10-CM

## 2024-12-11 DIAGNOSIS — M25.562 ACUTE BILATERAL KNEE PAIN: ICD-10-CM

## 2024-12-11 PROCEDURE — 3008F BODY MASS INDEX DOCD: CPT | Performed by: ORTHOPAEDIC SURGERY

## 2024-12-11 PROCEDURE — 73562 X-RAY EXAM OF KNEE 3: CPT | Mod: 50

## 2024-12-11 PROCEDURE — 99203 OFFICE O/P NEW LOW 30 MIN: CPT | Performed by: ORTHOPAEDIC SURGERY

## 2024-12-11 PROCEDURE — 73562 X-RAY EXAM OF KNEE 3: CPT | Mod: BILATERAL PROCEDURE | Performed by: STUDENT IN AN ORGANIZED HEALTH CARE EDUCATION/TRAINING PROGRAM

## 2024-12-11 PROCEDURE — 1036F TOBACCO NON-USER: CPT | Performed by: ORTHOPAEDIC SURGERY

## 2024-12-11 RX ORDER — MELOXICAM 15 MG/1
TABLET ORAL
Qty: 30 TABLET | Refills: 0 | Status: SHIPPED | OUTPATIENT
Start: 2024-12-11

## 2024-12-11 ASSESSMENT — ENCOUNTER SYMPTOMS
KNEE DEFORMITY: 1
KNEE SWELLING: 1

## 2024-12-11 NOTE — PROGRESS NOTES
Subjective    Patient ID: Keiry Kline is a 44 y.o. female.    Chief Complaint: Pain of the Left Knee (NKI/PAIN X SEVERAL MONTHS/+LIMPING +BOWING OF KNEES +G/O ) and Pain of the Right Knee (NKI/PAIN X SEVERAL MONTHS/+LIMPING +BOWING OF KNEES +G/O )       This is a 44-year-old female presents for evaluation of bilateral knee pain.  Pain is somewhat equal in severity right versus left.  This knee pain has been ongoing for the past year and a half.  She does describe an incident that she feels is the onset of her knee pain.  She was running in a 50 medial race and at the end of the race noted sharp stabbing pain along the medial and posterior aspect of each knee.  She did go to see a orthopedic physician assistant following that onset of pain and intra-articular injection of cortisone into each knee were of no significant benefit.  She has noted progressive bowlegged deformity of each knee since that onset of pain and experiences stiffness after prolonged sitting.  Pain limits functions of ADL including standing, walking and stair climbing.    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    Physical Examination  Constitutional: Patient's height and weight reviewed, appears well kempt, moderately obese  Psychiatric: Alert and oriented ×3, appropriate mood and behavior  Pulmonary: Breathing appears nonlabored, no apparent distress  Lymphatic: No appreciable lymphadenopathy to both the upper and lower extremities  Skin: No open lesions, rashes, ulcerations  Neurologic: Gross motor and sensory exam appear intact (except for abnormalities noted in the below muscle skeletal exam)    Musculoskeletal: There appears to be satisfactory range of motion of each hip without groin or thigh pain elicited.  Each knee  though demonstrates a varus alignment of at least 5 to 7 degrees that minimally corrects to valgus stress.  Each knee has a lack extension approximately 5 to 7 degrees with flexion on the right limited to 110 degrees on the left 105 degrees.  Patellofemoral crepitus in each knee throughout a range of motion.  Ambulates with an antalgic gait    X-rays performed today reviewed by myself along with the patient demonstrate advanced Marilyn changes of each knee with complete obliteration medial joint space as well as medial subluxation of the femur in relationship to the tibias.  This is dramatically different than her x-rays which were performed just 18+ months ago.    Assessment: Advanced degenerative arthritic changes of each knee with subluxation    Plan: It is my opinion that little could be offered to this patient with regard to her condition other than knee replacement.  I have suggested referral to one of our local joint specialist.  I have placed her on meloxicam at the present time to potentially bridge her with some help prior to that visit.  I have told her it is my opinion that she go forward with a knee replacement in a timely fashion as I feel her disease process is progressing rapidly and it would be more difficult to do a knee replacement if she delays thoughts with regard to this.    Left Knee     Right Knee           Current Outpatient Medications:     atorvastatin (Lipitor) 10 mg tablet, Take 1 tablet (10 mg) by mouth once daily., Disp: 90 tablet, Rfl: 1    drospirenone, contraceptive, (Slynd) 4 mg (28) tablet, Take 1 tablet by mouth once daily at bedtime., Disp: 84 tablet, Rfl: 3    drospirenone, contraceptive, 4 mg (28) tablet, Take 1 tablet by mouth once daily., Disp: 1 tablet, Rfl: 1    meloxicam (Mobic) 15 mg tablet, Take 1 tablet (15 mg) by mouth once daily., Disp: 90 tablet, Rfl: 0    meloxicam (Mobic) 15 mg tablet, Take one tablet once daily, Disp: 30 tablet, Rfl: 0    solifenacin (Vesicare)  10 mg tablet, Take 1 tablet (10 mg) by mouth once daily. Swallow tablet whole; do not crush, chew, or split., Disp: 90 tablet, Rfl: 1    valsartan (Diovan) 80 mg tablet, TAKE 1 TABLET(80 MG) BY MOUTH EVERY DAY, Disp: 90 tablet, Rfl: 1

## 2024-12-16 ENCOUNTER — HOSPITAL ENCOUNTER (OUTPATIENT)
Facility: HOSPITAL | Age: 44
Setting detail: OUTPATIENT SURGERY
End: 2024-12-16
Attending: ORTHOPAEDIC SURGERY | Admitting: ORTHOPAEDIC SURGERY
Payer: COMMERCIAL

## 2024-12-16 ENCOUNTER — APPOINTMENT (OUTPATIENT)
Facility: CLINIC | Age: 44
End: 2024-12-16
Payer: COMMERCIAL

## 2024-12-16 ENCOUNTER — PREP FOR PROCEDURE (OUTPATIENT)
Facility: CLINIC | Age: 44
End: 2024-12-16

## 2024-12-16 VITALS — BODY MASS INDEX: 36.99 KG/M2 | HEIGHT: 62 IN | WEIGHT: 201 LBS

## 2024-12-16 DIAGNOSIS — M25.362 INSTABILITY OF LEFT KNEE JOINT: ICD-10-CM

## 2024-12-16 DIAGNOSIS — M17.0 PRIMARY OSTEOARTHRITIS OF BOTH KNEES: ICD-10-CM

## 2024-12-16 DIAGNOSIS — M17.0 ARTHRITIS OF BOTH KNEES: Primary | ICD-10-CM

## 2024-12-16 ASSESSMENT — PAIN - FUNCTIONAL ASSESSMENT: PAIN_FUNCTIONAL_ASSESSMENT: 0-10

## 2024-12-16 ASSESSMENT — PAIN SCALES - GENERAL: PAINLEVEL_OUTOF10: 6

## 2024-12-16 NOTE — PROGRESS NOTES
PRIMARY CARE PHYSICIAN: Eusebio Aceves MD  REFERRING PROVIDER: No referring provider defined for this encounter.     CONSULT ORTHOPAEDIC: Knee Evaluation        ASSESSMENT & PLAN    IMPRESSION:   1.  Primary arthritis, bilateral knees    PLAN:   Discussed the patient findings above and reviewed her current x-rays with her.  She has severe arthritis of bilateral knees with varus subluxation and is failed conservative treatment.  Wants to discuss surgical intervention for her left knee. Keiry Kline has radiographic and physical exam evidence of degenerative joint disease and wishes to pursue surgery. The patient appears to have sufficient symptoms to warrant surgical intervention and is an appropriate candidate for  left Total Knee Arthroplasty as evidenced by six months of unsuccessful non-operative treatment as outlined in the HPI, progressive symptoms including pain impacting sleep or causing fatigue, pain impacting work, pain worsened with weight bearing, and pain limiting ability to stay fit and healthy.     We had a lengthy discussion regarding the risk and benefit of surgery, the alternatives, limitations, and personnel involved. The include but are not limited to infection, persistent pain, instability, nerve injury, blood clots, and medical complications. We also discussed the pre-operative course, surgery itself, and rehabilitation. Perioperative blood management and transfusion issues were discussed, and options clearly outlined. The patient consented to the use of allogenic blood if medically necessary.     The patient has elected to schedule surgery at this time or intents to call the office with a surgical date. Shared decision making occurred while obtaining informed consent. The patient with be scheduled for a pre-operative education class. The patient will be ordered appropriate preoperative labs to be completed for preadmission testing.     Robotic Assisted Surgery: Yes. The use of robotic  assisted surgery was discussed with the patient.  The risk, benefits were discussed regarding this.  Patient consented for this procedure.  We discussed specifically placement of pins and arrays for navigation during surgery and to help with the robotic assistance.  We discussed the use of an additional bandage to cover the pin sites.  We also reviewed that they may have some slight pain at the placement of pin sites that should improve in the same fashion as their general recovery of the joint replacement.    We discussed the term robot, when used to describe the RACHID system, refers to the Stylewhile robotic arm. The RACHID System is not a robot, but a surgeon-controlled robotic-arm assisted device.      - Preoperative Consults: PAT Clearance   - Disposition: Rapid recovery  - Anesthesia Plan: Preoperative block, spinal, local  - Implants: [place] CS, Rachid  - Physical Therapy Plan: Home  - [id number]: Yes  - Surgical Approach: Subvastus  - DVT PPx: Aspirin    Risk Assessment for Post-Op Antibiotics   - BMI >35: Yes    I hereby indicate that these comorbidities may have a detrimental effect on this arthroplasty.   -None present        SUBJECTIVE  CHIEF COMPLAINT:   Chief Complaint   Patient presents with    Right Knee - Pain    Left Knee - Pain        HPI: Keiry Kline is a 44 y.o. patient. Keiry Kline has had progressive problems with theirboth knees over the past  1 1/2   years. They do report any trauma, in May 2023 when her knees gave out while running a 50 meter dash and she fell on both knees. They do not report any constant or progressive numbness or tingling in their legs. Their symptoms are interfering with activities which include pain on the medial side of both knees that is worse when standing for long periods of time or walking a lot.     FUNCTIONAL STATUS: not limited.  AMBULATORY STATUS:  independent   PREVIOUS TREATMENTS: NSAIDS Ibuprofen and/or Meloxicam  with mild improvement  Cortisone injection  05/2023 with mild improvement  HISTORY OF SURGERY ON AFFECTED KNEE(S): No       REVIEW OF SYSTEMS  Constitutional: See HPI for pain assessment, No significant weight loss, recent trauma  Cardiovascular: No chest pain, shortness of breath  Respiratory: No difficulty breathing, cough  Gastrointestinal: No nausea, vomiting, diarrhea, constipation  Musculoskeletal: Noted in HPI, positive for pain, restricted motion, stiffness  Integumentary: No rashes, easy bruising, redness   Neurological: no numbness or tingling in extremities, no gait disturbances   Psychiatric: No mood changes, memory changes, social issues  Heme/Lymph: no excessive swelling, easy bruising, excessive bleeding  ENT: No hearing changes  Eyes: No vision changes    Past Medical History:   Diagnosis Date    Acute frontal sinusitis 06/19/2023    Acute upper respiratory infection 06/19/2023    Ankle pain 06/19/2023    Chest pain 06/19/2023    Nausea 06/19/2023    Other conditions influencing health status     Cervical Dysplasia    Pain, wrist joint 06/19/2023    Pain, wrist joint 06/19/2023        No Known Allergies     Past Surgical History:   Procedure Laterality Date    ANKLE SURGERY  05/10/2013    Ankle Surgery    OTHER SURGICAL HISTORY  05/10/2013    Cervical Conization Loop Electrode Excision    OTHER SURGICAL HISTORY  05/10/2013    Ovarian Cystectomy        Family History   Problem Relation Name Age of Onset    Diabetes Mother      Hypertension Father          Social History     Socioeconomic History    Marital status:      Spouse name: Not on file    Number of children: Not on file    Years of education: Not on file    Highest education level: Not on file   Occupational History    Not on file   Tobacco Use    Smoking status: Never    Smokeless tobacco: Never   Substance and Sexual Activity    Alcohol use: Never    Drug use: Never    Sexual activity: Not on file   Other Topics Concern    Not on file   Social History Narrative    Not on file  "    Social Drivers of Health     Financial Resource Strain: Not on file   Food Insecurity: No Food Insecurity (2/9/2024)    Hunger Vital Sign     Worried About Running Out of Food in the Last Year: Never true     Ran Out of Food in the Last Year: Never true   Transportation Needs: Not on file   Physical Activity: Not on file   Stress: Not on file   Social Connections: Not on file   Intimate Partner Violence: Not on file   Housing Stability: Unknown (2/9/2024)    Housing Stability Vital Sign     Unable to Pay for Housing in the Last Year: No     Number of Places Lived in the Last Year: Not on file     Unstable Housing in the Last Year: No        CURRENT MEDICATIONS:   Current Outpatient Medications   Medication Sig Dispense Refill    atorvastatin (Lipitor) 10 mg tablet Take 1 tablet (10 mg) by mouth once daily. 90 tablet 1    drospirenone, contraceptive, (Slynd) 4 mg (28) tablet Take 1 tablet by mouth once daily at bedtime. 84 tablet 3    drospirenone, contraceptive, 4 mg (28) tablet Take 1 tablet by mouth once daily. 1 tablet 1    meloxicam (Mobic) 15 mg tablet Take 1 tablet (15 mg) by mouth once daily. 90 tablet 0    meloxicam (Mobic) 15 mg tablet Take one tablet once daily 30 tablet 0    solifenacin (Vesicare) 10 mg tablet Take 1 tablet (10 mg) by mouth once daily. Swallow tablet whole; do not crush, chew, or split. 90 tablet 1    valsartan (Diovan) 80 mg tablet TAKE 1 TABLET(80 MG) BY MOUTH EVERY DAY 90 tablet 1     No current facility-administered medications for this visit.        OBJECTIVE    PHYSICAL EXAM      7/14/2022    11:28 AM 10/18/2022    10:06 AM 10/28/2022    11:33 AM 8/10/2023     2:21 PM 2/9/2024    10:21 AM 2/22/2024    10:09 AM 12/11/2024    10:24 AM   Vitals   Systolic 122 132 129 139 122 124    Diastolic 80 89 79 86 80 81    Heart Rate  66  62 84 56    Temp  36.7 °C (98 °F)        Resp  16   12     Height 1.6 m (5' 3\") 1.575 m (5' 2\") 1.575 m (5' 2\") 1.575 m (5' 2\") 1.575 m (5' 2\") 1.575 m (5' " "2\") 1.575 m (5' 2\")   Weight (lb) 217 195 195.99 192 187 194 194   BMI 38.44 kg/m2 35.67 kg/m2 35.85 kg/m2 35.12 kg/m2 34.2 kg/m2 35.48 kg/m2 35.48 kg/m2   BSA (m2) 2.09 m2 1.97 m2 1.97 m2 1.95 m2 1.93 m2 1.96 m2 1.96 m2   Visit Report    Report Report Report Report      There is no height or weight on file to calculate BMI.    GENERAL: A/Ox3, NAD. Appears healthy, well nourished  PSYCHIATRIC: Mood stable, appropriate memory recall  EYES: EOM intact, no scleral icterus  CARDIAC: regular rate  LUNGS: Breathing non-labored  SKIN: no erythema, rashes, or ecchymoses     MUSCULOSKELETAL:  Laterality: bilateral Knee Exam  - Alignment: partially correctible varus deformity  - ROM: 5 to 115 degrees  - Effusion: none  - Strength: knee extension and flexion 5/5, EHL/PF/DF motor intact  - Palpation: TTP along  medial and lateral  joint line  - Stability: Anterior/Posterior stable, varus/valgus stable  - Gait: normal  - Hip Exam: flexion to 100+ degrees, full extension, internal/external rotation adequate, and no pain with log roll  - Special Tests: none performed    NEUROVASCULAR:  - Neurovascular Status: sensation intact to light touch distally  - Capillary refill brisk at extremities, Bilateral dorsalis pedis pulse 2+     IMAGING:  Multiple views of the affected bilateral knee(s) demonstrate: Severe arthritic changes especially medial compartment complete joint space narrowing, subchondral sclerosis, fracture of medial marginal osteophytes and varus deformity.   X-rays were personally reviewed and interpreted by me.  Radiology reports were reviewed by me as well, if readily available at the time.        Lizandro Castorena DO  Attending Surgeon  Joint Replacement and Adult Reconstructive Surgery  [place], [place]                         "

## 2024-12-17 ENCOUNTER — APPOINTMENT (OUTPATIENT)
Dept: PRIMARY CARE | Facility: CLINIC | Age: 44
End: 2024-12-17
Payer: COMMERCIAL

## 2024-12-17 VITALS
BODY MASS INDEX: 37.73 KG/M2 | HEART RATE: 86 BPM | HEIGHT: 62 IN | DIASTOLIC BLOOD PRESSURE: 87 MMHG | SYSTOLIC BLOOD PRESSURE: 124 MMHG | WEIGHT: 205 LBS

## 2024-12-17 DIAGNOSIS — J01.00 ACUTE NON-RECURRENT MAXILLARY SINUSITIS: ICD-10-CM

## 2024-12-17 DIAGNOSIS — E55.9 VITAMIN D DEFICIENCY: ICD-10-CM

## 2024-12-17 DIAGNOSIS — Z00.00 ROUTINE GENERAL MEDICAL EXAMINATION AT A HEALTH CARE FACILITY: Primary | ICD-10-CM

## 2024-12-17 PROCEDURE — 3008F BODY MASS INDEX DOCD: CPT | Performed by: FAMILY MEDICINE

## 2024-12-17 PROCEDURE — 90656 IIV3 VACC NO PRSV 0.5 ML IM: CPT | Performed by: FAMILY MEDICINE

## 2024-12-17 PROCEDURE — 3079F DIAST BP 80-89 MM HG: CPT | Performed by: FAMILY MEDICINE

## 2024-12-17 PROCEDURE — 3074F SYST BP LT 130 MM HG: CPT | Performed by: FAMILY MEDICINE

## 2024-12-17 PROCEDURE — 99396 PREV VISIT EST AGE 40-64: CPT | Performed by: FAMILY MEDICINE

## 2024-12-17 PROCEDURE — 90471 IMMUNIZATION ADMIN: CPT | Performed by: FAMILY MEDICINE

## 2024-12-17 RX ORDER — AZITHROMYCIN 250 MG/1
TABLET, FILM COATED ORAL
Qty: 6 TABLET | Refills: 0 | Status: SHIPPED | OUTPATIENT
Start: 2024-12-17 | End: 2024-12-22

## 2024-12-17 ASSESSMENT — ENCOUNTER SYMPTOMS
DEPRESSION: 0
OCCASIONAL FEELINGS OF UNSTEADINESS: 0
LOSS OF SENSATION IN FEET: 0

## 2024-12-17 ASSESSMENT — PATIENT HEALTH QUESTIONNAIRE - PHQ9
SUM OF ALL RESPONSES TO PHQ9 QUESTIONS 1 AND 2: 0
1. LITTLE INTEREST OR PLEASURE IN DOING THINGS: NOT AT ALL
2. FEELING DOWN, DEPRESSED OR HOPELESS: NOT AT ALL

## 2024-12-18 ENCOUNTER — APPOINTMENT (OUTPATIENT)
Dept: PRIMARY CARE | Facility: CLINIC | Age: 44
End: 2024-12-18
Payer: COMMERCIAL

## 2024-12-18 ENCOUNTER — LAB (OUTPATIENT)
Dept: LAB | Facility: LAB | Age: 44
End: 2024-12-18
Payer: COMMERCIAL

## 2024-12-18 DIAGNOSIS — E55.9 VITAMIN D DEFICIENCY: ICD-10-CM

## 2024-12-18 DIAGNOSIS — Z00.00 ROUTINE GENERAL MEDICAL EXAMINATION AT A HEALTH CARE FACILITY: ICD-10-CM

## 2024-12-18 LAB
ERYTHROCYTE [DISTWIDTH] IN BLOOD BY AUTOMATED COUNT: 12.6 % (ref 11.5–14.5)
EST. AVERAGE GLUCOSE BLD GHB EST-MCNC: 117 MG/DL
HBA1C MFR BLD: 5.7 %
HCT VFR BLD AUTO: 44 % (ref 36–46)
HGB BLD-MCNC: 14.9 G/DL (ref 12–16)
MCH RBC QN AUTO: 31.1 PG (ref 26–34)
MCHC RBC AUTO-ENTMCNC: 33.9 G/DL (ref 32–36)
MCV RBC AUTO: 92 FL (ref 80–100)
NRBC BLD-RTO: 0 /100 WBCS (ref 0–0)
PLATELET # BLD AUTO: 307 X10*3/UL (ref 150–450)
RBC # BLD AUTO: 4.79 X10*6/UL (ref 4–5.2)
WBC # BLD AUTO: 4.7 X10*3/UL (ref 4.4–11.3)

## 2024-12-18 PROCEDURE — 84443 ASSAY THYROID STIM HORMONE: CPT

## 2024-12-18 PROCEDURE — 85027 COMPLETE CBC AUTOMATED: CPT

## 2024-12-18 PROCEDURE — 80061 LIPID PANEL: CPT

## 2024-12-18 PROCEDURE — 80053 COMPREHEN METABOLIC PANEL: CPT

## 2024-12-18 PROCEDURE — 83036 HEMOGLOBIN GLYCOSYLATED A1C: CPT

## 2024-12-18 PROCEDURE — 82607 VITAMIN B-12: CPT

## 2024-12-18 PROCEDURE — 36415 COLL VENOUS BLD VENIPUNCTURE: CPT

## 2024-12-18 PROCEDURE — 82306 VITAMIN D 25 HYDROXY: CPT

## 2024-12-19 LAB
25(OH)D3 SERPL-MCNC: 22 NG/ML (ref 30–100)
ALBUMIN SERPL BCP-MCNC: 4.3 G/DL (ref 3.4–5)
ALP SERPL-CCNC: 59 U/L (ref 33–110)
ALT SERPL W P-5'-P-CCNC: 16 U/L (ref 7–45)
ANION GAP SERPL CALC-SCNC: 15 MMOL/L (ref 10–20)
AST SERPL W P-5'-P-CCNC: 13 U/L (ref 9–39)
BILIRUB SERPL-MCNC: 0.4 MG/DL (ref 0–1.2)
BUN SERPL-MCNC: 14 MG/DL (ref 6–23)
CALCIUM SERPL-MCNC: 9.5 MG/DL (ref 8.6–10.6)
CHLORIDE SERPL-SCNC: 102 MMOL/L (ref 98–107)
CHOLEST SERPL-MCNC: 225 MG/DL (ref 0–199)
CHOLESTEROL/HDL RATIO: 4.3
CO2 SERPL-SCNC: 27 MMOL/L (ref 21–32)
CREAT SERPL-MCNC: 0.58 MG/DL (ref 0.5–1.05)
EGFRCR SERPLBLD CKD-EPI 2021: >90 ML/MIN/1.73M*2
GLUCOSE SERPL-MCNC: 90 MG/DL (ref 74–99)
HDLC SERPL-MCNC: 52.1 MG/DL
LDLC SERPL CALC-MCNC: 150 MG/DL
NON HDL CHOLESTEROL: 173 MG/DL (ref 0–149)
POTASSIUM SERPL-SCNC: 4.3 MMOL/L (ref 3.5–5.3)
PROT SERPL-MCNC: 7.8 G/DL (ref 6.4–8.2)
SODIUM SERPL-SCNC: 140 MMOL/L (ref 136–145)
TRIGL SERPL-MCNC: 114 MG/DL (ref 0–149)
TSH SERPL-ACNC: 2.72 MIU/L (ref 0.44–3.98)
VIT B12 SERPL-MCNC: 547 PG/ML (ref 211–911)
VLDL: 23 MG/DL (ref 0–40)

## 2024-12-20 ENCOUNTER — TELEPHONE (OUTPATIENT)
Dept: ORTHOPEDIC SURGERY | Facility: CLINIC | Age: 44
End: 2024-12-20
Payer: COMMERCIAL

## 2024-12-20 NOTE — TELEPHONE ENCOUNTER
Patient is currently scheduled for L TKA on 2/14/25. She called today stating that her insurance will not cover her surgery unless it is done at the [facility]. She would like to know if we are able to appeal this or does she need to find a new surgeon? Please advise.

## 2024-12-20 NOTE — TELEPHONE ENCOUNTER
Pended to patients insurance company.  Pending ref #: [id number]    I have not heard that [facility] would not cover [facility]. Waiting to see if surgery gets approved.

## 2024-12-22 PROBLEM — J01.00 ACUTE NON-RECURRENT MAXILLARY SINUSITIS: Status: ACTIVE | Noted: 2024-12-22

## 2024-12-22 NOTE — PROGRESS NOTES
"  Subjective     Patient ID: Keiry Kline is a 44 y.o. female who presents for Annual Exam.      Last Physical :  1Years ago     Pt's PMH, PSH, SH, FH , meds and allergies was obtained / reviewed and updated .     Dental visits : Y  Vision issues : N  Hearing issues : N    Immunizations : Y    Diet :  could be better  Exercise:  Weight concerns : yes    Alcohol: as noted in SH  Tobacco: as noted in SH  Recreational drug use : None/ as noted in SH    Sexually active : Active   Contraception :   Menstrual problems:  Premenopausal/perimenopausal/ postmenopausal:    G:  Parity:  Full term:    Premature:   (s):   Living :  Ab induced:   Ab spontaneous :  Ectopic :   Multiple :    PAP smear :  Mammogram : Ordered  Colonoscopy:    Metabolic screening   - Lipid   - Glucose  ======================================================    Visit Vitals  /87   Pulse 86   Ht 1.575 m (5' 2\")   Wt 93 kg (205 lb)   BMI 37.49 kg/m²   OB Status Having periods   Smoking Status Never   BSA 2.02 m²      No LMP recorded.     =====================================    Review of systems:  Constitutional: no chills, no fever and no night sweats.     Eyes: no blurred vision and no eyesight problems.     ENT: no hearing loss, no nasal congestion, no nasal discharge, no hoarseness and no sore throat.     Cardiovascular: no chest pain, no intermittent leg claudication, no lower extremity edema, no palpitations and no syncope.     Respiratory: no cough, no shortness of breath during exertion, no shortness of breath at rest and no wheezing.     Gastrointestinal: no abdominal pain, no blood in stools, no constipation, no diarrhea, no melena, no nausea, no rectal pain and no vomiting.     Genitourinary: no dysuria, no change in urinary frequency, no urinary hesitancy, no feelings of urinary urgency and no vaginal discharge.     Musculoskeletal: no arthralgias, no back pain and no myalgias.     Integumentary: no new skin lesions and no " rashes.     Neurological: no difficulty walking, no headache, no limb weakness, no numbness and no tingling.     Psychiatric: no anxiety, no depression, no anhedonia and no substance use disorders.   ============================================================    Physical exam :    Constitutional: Alert and in no acute distress. Well developed, well nourished.     Eyes: Normal external exam. Pupils were equal in size, round, reactive to light (PERRL) with normal accommodation and extraocular movements intact (EOMI).     Ears, Nose, Mouth, and Throat: External inspection of ears and nose: Normal.  Otoscopic examination: Normal.      Neck: No neck mass was observed. Supple.     Cardiovascular: Heart rate and rhythm were normal, normal S1 and S2, no gallops, no murmurs and no pericardial rub    Pulmonary: No respiratory distress. Clear bilateral breath sounds.     Abdomen: Soft nontender; no abdominal mass palpated. No organomegaly.     Musculoskeletal: knee      Skin: Normal skin color and pigmentation, normal skin turgor, and no rash.     Neurologic: Deep tendon reflexes were 2+ and symmetric. Sensation: Normal.     Psychiatric: Judgment and insight: Intact. Mood and affect: Normal.    Lymphatic : Cervical/ axillary/ groin Lns Palpable/ non palpable     Endocrine: no recent weight gain and no recent weight loss.     Hematologic/Lymphatic: no tendency for easy bruising and no swollen glands.          All other systems have been reviewed and are negative for complaint.      Assessment/Plan    Problem List Items Addressed This Visit       Vitamin D deficiency    Relevant Orders    Vitamin B12 (Completed)    Vitamin D 25-Hydroxy,Total (for eval of Vitamin D levels) (Completed)    Routine general medical examination at a health care facility - Primary    Relevant Orders    Lipid Panel (Completed)    CBC (Completed)    TSH with reflex to Free T4 if abnormal (Completed)    Hemoglobin A1C (Completed)    Comprehensive  Metabolic Panel (Completed)    Acute non-recurrent maxillary sinusitis    Relevant Medications    azithromycin (Zithromax) 250 mg tablet

## 2025-01-02 ENCOUNTER — HOSPITAL ENCOUNTER (OUTPATIENT)
Dept: RADIOLOGY | Facility: CLINIC | Age: 45
Discharge: HOME | End: 2025-01-02
Payer: COMMERCIAL

## 2025-01-02 ENCOUNTER — TELEPHONE (OUTPATIENT)
Dept: ORTHOPEDIC SURGERY | Facility: CLINIC | Age: 45
End: 2025-01-02

## 2025-01-02 ENCOUNTER — APPOINTMENT (OUTPATIENT)
Dept: ORTHOPEDIC SURGERY | Facility: CLINIC | Age: 45
End: 2025-01-02
Payer: COMMERCIAL

## 2025-01-02 DIAGNOSIS — M17.12 PRIMARY OSTEOARTHRITIS OF LEFT KNEE: ICD-10-CM

## 2025-01-02 DIAGNOSIS — Z12.31 ENCOUNTER FOR SCREENING MAMMOGRAM FOR MALIGNANT NEOPLASM OF BREAST: ICD-10-CM

## 2025-01-02 DIAGNOSIS — M17.0 PRIMARY OSTEOARTHRITIS OF BOTH KNEES: Primary | ICD-10-CM

## 2025-01-02 PROCEDURE — 77067 SCR MAMMO BI INCL CAD: CPT | Performed by: RADIOLOGY

## 2025-01-02 PROCEDURE — 77063 BREAST TOMOSYNTHESIS BI: CPT | Performed by: RADIOLOGY

## 2025-01-02 PROCEDURE — 77063 BREAST TOMOSYNTHESIS BI: CPT

## 2025-01-02 NOTE — PROGRESS NOTES
History of Present Illness:  Patient presents with bilateral knee pain.  The patient localizes the pain diffusely.  Recently there has been concern for falls and instability.  There is increasing difficulty with activities of daily living and significant disability related to the knee pain.  The patient endorses the following failed non-operative treatments: Rest, ice, anti-inflammatories as well as previous corticosteroid injections.   There is increasing frustration with persistent pain and swelling and decreasing distance of ambulation.  Pain is moderate, achy, diffuse.  Better with rest, worse with activity.     She has been inquiring about options for total knee arthroplasty with several different providers, due to insurance issues as well as social constraints and timeline she has had difficulty with scheduling this operation.  She is expressing her concern today regarding an upcoming arthroplasty getting canceled via insurance, and she would like to discuss other options.    She is very active and works for [employer] and walks/stands for about 10 to 12 hours/day.  She cares for her family as well.  Currently lives in [place].      Review of Systems   GENERAL: Negative for malaise, significant weight loss, fever  MUSCULOSKELETAL: see HPI  NEURO:  Negative    Past Medical History:   Diagnosis Date    Acute frontal sinusitis 06/19/2023    Acute upper respiratory infection 06/19/2023    Ankle pain 06/19/2023    Chest pain 06/19/2023    Nausea 06/19/2023    Other conditions influencing health status     Cervical Dysplasia    Pain, wrist joint 06/19/2023    Pain, wrist joint 06/19/2023     Past Surgical History:   Procedure Laterality Date    ANKLE SURGERY  05/10/2013    Ankle Surgery    OTHER SURGICAL HISTORY  05/10/2013    Cervical Conization Loop Electrode Excision    OTHER SURGICAL HISTORY  05/10/2013    Ovarian Cystectomy       Current Outpatient Medications:     atorvastatin (Lipitor) 10 mg tablet, Take 1  tablet (10 mg) by mouth once daily., Disp: 90 tablet, Rfl: 1    drospirenone, contraceptive, (Slynd) 4 mg (28) tablet, Take 1 tablet by mouth once daily at bedtime., Disp: 84 tablet, Rfl: 3    drospirenone, contraceptive, 4 mg (28) tablet, Take 1 tablet by mouth once daily., Disp: 1 tablet, Rfl: 1    meloxicam (Mobic) 15 mg tablet, Take 1 tablet (15 mg) by mouth once daily., Disp: 90 tablet, Rfl: 0    meloxicam (Mobic) 15 mg tablet, Take one tablet once daily, Disp: 30 tablet, Rfl: 0    solifenacin (Vesicare) 10 mg tablet, Take 1 tablet (10 mg) by mouth once daily. Swallow tablet whole; do not crush, chew, or split., Disp: 90 tablet, Rfl: 1    valsartan (Diovan) 80 mg tablet, TAKE 1 TABLET(80 MG) BY MOUTH EVERY DAY, Disp: 90 tablet, Rfl: 1      Physical Examination:  Bilateral Knee:  Skin healthy and intact  No gross swelling or ecchymosis  Alignment: varus      Effusion: moderate       ROM: Normal  Crepitance with range of motion  No pain with internal rotation of the hip  Tenderness to palpation over medial and lateral joint line and with patellar compression     No laxity to valgus stress  No laxity to varus stress  Negative Lachman´s test  Negative posterior drawer test  Mild pain with Trenton´s test  Neurovascular exam normal distally  2+ DP pulse and good cap refill    Radiographs:  Severe degenerative joint disease with loss of joint space, subchondral sclerosis and cystic changes, and osteophyte formation    Assessment:  Patient with bilateral knee osteoarthritis    Plan:  We discussed the diagnosis of degenerative joint disease of the knee.  We reviewed an evidence-based approach to osteoarthritis of the knee.  We strongly encouraged low-impact aerobic activity and non-opioid analgesics.     We discussed temporary pain relief with corticosteroid injections and the associated risks.  We also discussed the conflicting evidence regarding viscosupplementation and potential long-term risks with NSAID´s.  We  reviewed the role of bracing for instability and physical therapy for atrophy and gait abnormalities.  The patient elected for further discussion for TKA to both knees, we will continue to discuss Left vs Right.    Prashanth Alvarez PA-C     In a face to face encounter, I evaluated the patient and performed a physical examination, discussed pertinent diagnostic studies if indicated and discussed diagnosis and management strategies with both the patient and physician assistant / nurse practitioner.  I reviewed the PA/NP's note and agree with the documented findings and plan of care.    The patient has failed to improve with multiple non-operative modalities.  There is increasing difficulty with activities of daily living and concern for falls.  The patient is endorsing severe pain and disability.      We had a lengthy discussion regarding total knee arthroplasty including the orthopaedic risks, including but not limited to: stiffness, infection, hematoma, early aseptic loosening, neurologic or vascular injury, clicking, difficulty kneeling and incomplete relief of pain.  We reviewed the medical risks, including but not limited to: deep venous thrombosis, pulmonary embolism, and cardiovascular/pulmonary issues.    We discussed the anticipated longevity of the implants and potential for revision surgery.  We also discussed anticoagulation, rehabilitation goals, and the hospital course.  We discussed the likelihood of opioid analgesics and risks associated with them.    The next step is to obtain medical risk stratification and encouraged the pre-operative information seminar at the hospital.  We are happy to provide assistance and counseling if the patient has any additional concerns.      Carlitos Gallagher MD

## 2025-01-07 ENCOUNTER — TELEPHONE (OUTPATIENT)
Dept: ORTHOPEDIC SURGERY | Facility: CLINIC | Age: 45
End: 2025-01-07
Payer: COMMERCIAL

## 2025-01-07 NOTE — TELEPHONE ENCOUNTER
Pt called stating there is no way she will be able to work with the pain if she can't take anything the week before her surgery. She wants to know if there is anything besides tylenol that can be given since she can't take the ibuprofen.

## 2025-01-09 ENCOUNTER — TELEPHONE (OUTPATIENT)
Dept: OBSTETRICS AND GYNECOLOGY | Facility: CLINIC | Age: 45
End: 2025-01-09
Payer: COMMERCIAL

## 2025-01-13 ASSESSMENT — DUKE ACTIVITY SCORE INDEX (DASI)
DASI METS SCORE: 6.3
CAN YOU WALK INDOORS, SUCH AS AROUND YOUR HOUSE: YES
CAN YOU HAVE SEXUAL RELATIONS: YES
CAN YOU CLIMB A FLIGHT OF STAIRS OR WALK UP A HILL: YES
CAN YOU DO YARD WORK LIKE RAKING LEAVES, WEEDING OR PUSHING A MOWER: YES
CAN YOU TAKE CARE OF YOURSELF (EAT, DRESS, BATHE, OR USE TOILET): YES
CAN YOU RUN A SHORT DISTANCE: NO
CAN YOU DO LIGHT WORK AROUND THE HOUSE LIKE DUSTING OR WASHING DISHES: YES
CAN YOU DO MODERATE WORK AROUND THE HOUSE LIKE VACUUMING, SWEEPING FLOORS OR CARRYING GROCERIES: YES
CAN YOU WALK A BLOCK OR TWO ON LEVEL GROUND: YES
TOTAL_SCORE: 28.7
CAN YOU PARTICIPATE IN STRENOUS SPORTS LIKE SWIMMING, SINGLES TENNIS, FOOTBALL, BASKETBALL, OR SKIING: NO
CAN YOU DO HEAVY WORK AROUND THE HOUSE LIKE SCRUBBING FLOORS OR LIFTING AND MOVING HEAVY FURNITURE: NO
CAN YOU PARTICIPATE IN MODERATE RECREATIONAL ACTIVITIES LIKE GOLF, BOWLING, DANCING, DOUBLES TENNIS OR THROWING A BASEBALL OR FOOTBALL: NO

## 2025-01-13 ASSESSMENT — ACTIVITIES OF DAILY LIVING (ADL): ADL_SCORE: 0

## 2025-01-13 ASSESSMENT — LIFESTYLE VARIABLES: SMOKING_STATUS: NONSMOKER

## 2025-01-14 ENCOUNTER — PRE-ADMISSION TESTING (OUTPATIENT)
Dept: PREADMISSION TESTING | Facility: HOSPITAL | Age: 45
End: 2025-01-14
Payer: COMMERCIAL

## 2025-01-14 ENCOUNTER — LAB (OUTPATIENT)
Dept: LAB | Facility: LAB | Age: 45
End: 2025-01-14
Payer: COMMERCIAL

## 2025-01-14 VITALS
OXYGEN SATURATION: 97 % | TEMPERATURE: 98.4 F | WEIGHT: 203.71 LBS | BODY MASS INDEX: 37.49 KG/M2 | DIASTOLIC BLOOD PRESSURE: 89 MMHG | RESPIRATION RATE: 20 BRPM | SYSTOLIC BLOOD PRESSURE: 146 MMHG | HEIGHT: 62 IN | HEART RATE: 82 BPM

## 2025-01-14 DIAGNOSIS — M17.12 UNILATERAL PRIMARY OSTEOARTHRITIS, LEFT KNEE: ICD-10-CM

## 2025-01-14 DIAGNOSIS — Z01.818 PREOP EXAMINATION: Primary | ICD-10-CM

## 2025-01-14 LAB
ALBUMIN SERPL BCP-MCNC: 4.2 G/DL (ref 3.4–5)
ALP SERPL-CCNC: 51 U/L (ref 33–110)
ALT SERPL W P-5'-P-CCNC: 16 U/L (ref 7–45)
ANION GAP SERPL CALC-SCNC: 11 MMOL/L (ref 10–20)
APTT PPP: 32 SECONDS (ref 27–38)
AST SERPL W P-5'-P-CCNC: 14 U/L (ref 9–39)
BASOPHILS # BLD AUTO: 0.03 X10*3/UL (ref 0–0.1)
BASOPHILS NFR BLD AUTO: 0.5 %
BILIRUB SERPL-MCNC: 0.4 MG/DL (ref 0–1.2)
BUN SERPL-MCNC: 15 MG/DL (ref 6–23)
CALCIUM SERPL-MCNC: 9 MG/DL (ref 8.6–10.3)
CHLORIDE SERPL-SCNC: 107 MMOL/L (ref 98–107)
CO2 SERPL-SCNC: 26 MMOL/L (ref 21–32)
CREAT SERPL-MCNC: 0.65 MG/DL (ref 0.5–1.05)
EGFRCR SERPLBLD CKD-EPI 2021: >90 ML/MIN/1.73M*2
EOSINOPHIL # BLD AUTO: 0.14 X10*3/UL (ref 0–0.7)
EOSINOPHIL NFR BLD AUTO: 2.3 %
ERYTHROCYTE [DISTWIDTH] IN BLOOD BY AUTOMATED COUNT: 13.7 % (ref 11.5–14.5)
GLUCOSE SERPL-MCNC: 91 MG/DL (ref 74–99)
HCT VFR BLD AUTO: 39 % (ref 36–46)
HGB BLD-MCNC: 12.5 G/DL (ref 12–16)
IMM GRANULOCYTES # BLD AUTO: 0.01 X10*3/UL (ref 0–0.7)
IMM GRANULOCYTES NFR BLD AUTO: 0.2 % (ref 0–0.9)
INR PPP: 1 (ref 0.9–1.1)
LYMPHOCYTES # BLD AUTO: 1.92 X10*3/UL (ref 1.2–4.8)
LYMPHOCYTES NFR BLD AUTO: 31 %
MCH RBC QN AUTO: 30.1 PG (ref 26–34)
MCHC RBC AUTO-ENTMCNC: 32.1 G/DL (ref 32–36)
MCV RBC AUTO: 94 FL (ref 80–100)
MONOCYTES # BLD AUTO: 0.38 X10*3/UL (ref 0.1–1)
MONOCYTES NFR BLD AUTO: 6.1 %
NEUTROPHILS # BLD AUTO: 3.72 X10*3/UL (ref 1.2–7.7)
NEUTROPHILS NFR BLD AUTO: 59.9 %
NRBC BLD-RTO: 0 /100 WBCS (ref 0–0)
PLATELET # BLD AUTO: 317 X10*3/UL (ref 150–450)
POTASSIUM SERPL-SCNC: 3.9 MMOL/L (ref 3.5–5.3)
PROT SERPL-MCNC: 7 G/DL (ref 6.4–8.2)
PROTHROMBIN TIME: 11.3 SECONDS (ref 9.8–12.8)
RBC # BLD AUTO: 4.15 X10*6/UL (ref 4–5.2)
SODIUM SERPL-SCNC: 140 MMOL/L (ref 136–145)
WBC # BLD AUTO: 6.2 X10*3/UL (ref 4.4–11.3)

## 2025-01-14 PROCEDURE — 87081 CULTURE SCREEN ONLY: CPT | Mod: STJLAB

## 2025-01-14 PROCEDURE — 99202 OFFICE O/P NEW SF 15 MIN: CPT

## 2025-01-14 PROCEDURE — 93005 ELECTROCARDIOGRAM TRACING: CPT

## 2025-01-14 RX ORDER — CHLORHEXIDINE GLUCONATE ORAL RINSE 1.2 MG/ML
SOLUTION DENTAL
Qty: 473 ML | Refills: 0 | Status: SHIPPED | OUTPATIENT
Start: 2025-01-14 | End: 2025-01-18 | Stop reason: HOSPADM

## 2025-01-14 NOTE — PREPROCEDURE INSTRUCTIONS
Medication List            Accurate as of January 14, 2025  1:28 PM. Always use your most recent med list.                atorvastatin 10 mg tablet  Commonly known as: Lipitor  Take 1 tablet (10 mg) by mouth once daily.  Medication Adjustments for Surgery: Take/Use as prescribed     chlorhexidine 0.12 % solution  Commonly known as: Peridex  15 milliliter(s) orally once a day for 2 doses 15 ml  the night before surgery and 15 ml morning of surgery - swish for 30 seconds -DO NOT SWALLOW, SPIT OUT     * drospirenone (contraceptive) 4 mg (28) tablet  Take 1 tablet by mouth once daily.  Additional Medication Adjustments for Surgery: Other (Comment)  Notes to patient: Coordinate with prescribing provider for further instructions on this medications prior to surgery.     * Slynd 4 mg (28) tablet  Generic drug: drospirenone (contraceptive)  Take 1 tablet by mouth once daily at bedtime.  Additional Medication Adjustments for Surgery: Other (Comment)  Notes to patient: Coordinate with prescribing provider for further instructions on this medications prior to surgery.     * meloxicam 15 mg tablet  Commonly known as: Mobic  Take 1 tablet (15 mg) by mouth once daily.  Additional Medication Adjustments for Surgery: Take last dose 7 days before surgery     * meloxicam 15 mg tablet  Commonly known as: Mobic  Take one tablet once daily  Additional Medication Adjustments for Surgery: Take last dose 7 days before surgery     solifenacin 10 mg tablet  Commonly known as: Vesicare  Take 1 tablet (10 mg) by mouth once daily. Swallow tablet whole; do not crush, chew, or split.  Additional Medication Adjustments for Surgery: Other (Comment)  Notes to patient: Hold the day of surgery      valsartan 80 mg tablet  Commonly known as: Diovan  TAKE 1 TABLET(80 MG) BY MOUTH EVERY DAY  Additional Medication Adjustments for Surgery: Other (Comment)  Notes to patient: HOLD any evening dose the night before the day of surgery  HOLD the day of  surgery           * This list has 4 medication(s) that are the same as other medications prescribed for you. Read the directions carefully, and ask your doctor or other care provider to review them with you.                                PRE-OPERATIVE INSTRUCTIONS    You will receive notification one business day prior to your procedure to confirm your arrival time. It is important that you answer your phone and/or check your messages during this time. If you do not hear from the surgery center by 5 pm. the day before your procedure, please call [phone].     Please enter the building through the Outpatient entrance and take the elevator off the lobby to the 2nd floor then check in at the Outpatient Surgery desk on the 2nd floor.    INSTRUCTIONS:  Talk to your surgeon for instructions if you should stop your aspirin, blood thinner, or diabetes medicines.  DO NOT take any multivitamins or over the counter supplements for 7-10 days before surgery.  If not being admitted, you must have an adult immediately available to drive you home after surgery. We also highly recommend you have someone stay with you for the entire day and night of your surgery.  For children having surgery, a parent or legal guardian must accompany them to the surgery center. If this is not possible, please call [phone] to make additional arrangements.  For adults who are unable to consent or make medical decisions for themselves, a legal guardian or Power of  must accompany them to the surgery center. If this is not possible, please call [phone] to make additional arrangements.  Wear comfortable, loose fitting clothing.  All jewelry and piercings must be removed. If you are unable to remove an item or have a dermal piercing, please be sure to tell the nurse when you arrive for surgery.  Nail polish and make-up must be removed.  Avoid smoking or consuming alcohol for 24 hours before surgery.  To help prevent infection, please  take a shower/bath and wash your hair the night before and/or morning of surgery (or follow other specific bathing instructions provided).    Preoperative Fasting Guidelines    Why must I stop eating and drinking near surgery time?  With sedation, food or liquid in your stomach can enter your lungs causing serious complications  Increases nausea and vomiting    When do I need to stop eating and drinking before my surgery?  Do not eat any solid food after midnight the night before your surgery/procedure unless otherwise instructed by your surgeon.   You may have up to 13.5 ounces of clear liquid until TWO hours before your instructed arrival time to the hospital.  This includes water, black tea/coffee, (no milk or cream) apple juice, and electrolyte drinks (Gatorade).   You may chew gum until TWO hours before your surgery/procedure      If applicable, notify your surgeons office immediately of any new skin changes that occur to the surgical limb.      If you have any questions or concerns, please call Pre-Admission Testing at [phone].

## 2025-01-14 NOTE — CPM/PAT H&P
CPM/PAT Evaluation       Name: Keiry Kline (Keiry Kline)  /Age: 1980/44 y.o.     In-Person       Chief Complaint: Left knee pain     HPI.    Pleasant 44-year-old female presents with unilateral primary osteoarthritis of the left knee scheduled for left knee total arthroplasty on 2025.  States she has pain in her bilateral knees.  She has been having pain for roughly 1.5 years.  Endorses limited range of motion due to pain.  She has plans to have her right knee replaced after this procedure.  Denies recent fever/illness/chills.    Past Medical History:   Diagnosis Date    Acute frontal sinusitis 2023    Acute upper respiratory infection 2023    Ankle pain 2023    Chest pain 2023    Delayed emergence from general anesthesia     Hyperlipidemia     Hypertension     Nausea 2023    Other conditions influencing health status     Cervical Dysplasia    Pain, wrist joint 2023    Pain, wrist joint 2023    PONV (postoperative nausea and vomiting)        Past Surgical History:   Procedure Laterality Date    ANKLE SURGERY  05/10/2013    Ankle Surgery    OTHER SURGICAL HISTORY  05/10/2013    Cervical Conization Loop Electrode Excision    OTHER SURGICAL HISTORY  05/10/2013    Ovarian Cystectomy       Patient  has no history on file for sexual activity.    Family History   Problem Relation Name Age of Onset    Diabetes Mother      Hypertension Father         No Known Allergies    Prior to Admission medications    Medication Sig Start Date End Date Taking? Authorizing Provider   atorvastatin (Lipitor) 10 mg tablet Take 1 tablet (10 mg) by mouth once daily. 24  Eusebio Aceves MD   drospirenone, contraceptive, (Slynd) 4 mg (28) tablet Take 1 tablet by mouth once daily at bedtime. 24  Cherelle Oneil MD   drospirenone, contraceptive, 4 mg (28) tablet Take 1 tablet by mouth once daily. 24  Cherelle Oneil MD   meloxicam (Mobic) 15 mg tablet  Take 1 tablet (15 mg) by mouth once daily. 2/22/24   Eusebio Aceves MD   meloxicam (Mobic) 15 mg tablet Take one tablet once daily 12/11/24   Michael A Lopresti, MD   solifenacin (Vesicare) 10 mg tablet Take 1 tablet (10 mg) by mouth once daily. Swallow tablet whole; do not crush, chew, or split. 2/22/24 2/21/25  Eusebio Aceves MD   valsartan (Diovan) 80 mg tablet TAKE 1 TABLET(80 MG) BY MOUTH EVERY DAY 1/25/24   Eusebio Aceves MD      Constitutional: Negative for fever, chills, or sweats   ENMT: Negative for nasal discharge, congestion, ear pain, mouth pain, throat pain. Positive for contacts. Positive for intermittent sinus congestion.   Respiratory: Negative for cough, wheezing, shortness of breath   Cardiac: Negative for chest pain, dyspnea on exertion, palpitations   Gastrointestinal: Negative for nausea, vomiting, diarrhea, constipation, abdominal pain  Genitourinary: Negative for dysuria, flank pain, frequency, hematuria. Positive for OAB.    Musculoskeletal: Negative for decreased ROM, pain, swelling, weakness. See HPI. Positive for pain in bilateral hands.   Neurological: Negative for dizziness, confusion, headache  Psychiatric: Negative for mood changes   Skin: Negative for itching, rash, ulcer    Hematologic/Lymph: Negative for bruising, easy bleeding  Allergic/Immunologic: Negative itching, sneezing, swelling      Physical Exam  Vitals reviewed.   Constitutional:       Appearance: Normal appearance.   HENT:      Head: Normocephalic.      Mouth/Throat:      Mouth: Mucous membranes are moist.      Pharynx: Oropharynx is clear.   Eyes:      Pupils: Pupils are equal, round, and reactive to light.   Cardiovascular:      Rate and Rhythm: Normal rate and regular rhythm.      Heart sounds: Normal heart sounds.   Pulmonary:      Effort: Pulmonary effort is normal.      Breath sounds: Normal breath sounds.   Abdominal:      General: Bowel sounds are normal.      Palpations: Abdomen is soft.   Musculoskeletal:     "     General: Normal range of motion.      Cervical back: Normal range of motion.   Skin:     General: Skin is warm and dry.   Neurological:      General: No focal deficit present.      Mental Status: She is alert and oriented to person, place, and time.   Psychiatric:         Mood and Affect: Mood normal.         Behavior: Behavior normal.          PAT AIRWAY:   Airway:     Mallampati::  II    Neck ROM::  Full  normal        Testing/Diagnostic:     Patient Specialist/PCP:   PCP: Dr. Aceves     Visit Vitals  /89   Pulse 82   Temp 36.9 °C (98.4 °F) (Temporal)   Resp 20   Ht 1.575 m (5' 2\")   Wt 92.4 kg (203 lb 11.3 oz)   SpO2 97%   BMI 37.26 kg/m²   OB Status Having periods   Smoking Status Never   BSA 2.01 m²       DASI Risk Score      Flowsheet Row Pre-Admission Testing from 1/14/2025 in [facility] Questionnaire Series Submission from 1/4/2025 in [facility] with Generic Provider Xiomara   Can you take care of yourself (eat, dress, bathe, or use toilet)?  2.75 filed at 01/13/2025 1343 2.75  filed at 01/04/2025 2203   Can you walk indoors, such as around your house? 1.75 filed at 01/13/2025 1343 1.75  filed at 01/04/2025 2203   Can you walk a block or two on level ground?  2.75 filed at 01/13/2025 1343 2.75  filed at 01/04/2025 2203   Can you climb a flight of stairs or walk up a hill? 5.5 filed at 01/13/2025 1343 5.5  filed at 01/04/2025 2203   Can you run a short distance? 0 filed at 01/13/2025 1343 0  filed at 01/04/2025 2203   Can you do light work around the house like dusting or washing dishes? 2.7 filed at 01/13/2025 1343 2.7  filed at 01/04/2025 2203   Can you do moderate work around the house like vacuuming, sweeping floors or carrying groceries? 3.5 filed at 01/13/2025 1343 3.5  filed at 01/04/2025 2203   Can you do heavy work around the house like scrubbing floors or lifting and moving heavy furniture?  0 filed at 01/13/2025 1343 0  filed at 01/04/2025 2203   Can you do yard work like " raking leaves, weeding or pushing a mower? 4.5 filed at 01/13/2025 1343 4.5  filed at 01/04/2025 2203   Can you have sexual relations? 5.25 filed at 01/13/2025 1343 5.25  filed at 01/04/2025 2203   Can you participate in moderate recreational activities like golf, bowling, dancing, doubles tennis or throwing a baseball or football? 0 filed at 01/13/2025 1343 6  filed at 01/04/2025 2203   Can you participate in strenous sports like swimming, singles tennis, football, basketball, or skiing? 0 filed at 01/13/2025 1343 0  filed at 01/04/2025 2203   DASI SCORE 28.7 filed at 01/13/2025 1343 34.7  filed at 01/04/2025 2203   METS Score (Will be calculated only when all the questions are answered) 6.3 filed at 01/13/2025 1343 7  filed at 01/04/2025 2203          Caprini DVT Assessment      Flowsheet Row Pre-Admission Testing from 1/14/2025 in [facility]   DVT Score (IF A SCORE IS NOT CALCULATING, MUST SELECT A BMI TO COMPLETE) 9 filed at 01/13/2025 1342   Surgical Factors Elective major lower extremity arthroplasty filed at 01/13/2025 1342   BMI (BMI MUST BE CHOSEN) 31-40 (Obesity) filed at 01/13/2025 1342          Modified Frailty Index      Flowsheet Row Pre-Admission Testing from 1/14/2025 in [facility]   Non-independent functional status (problems with dressing, bathing, personal grooming, or cooking) 0 filed at 01/13/2025 1343   History of diabetes mellitus  0 filed at 01/13/2025 1343   History of COPD 0 filed at 01/13/2025 1343   History of CHF No filed at 01/13/2025 1343   History of MI 0 filed at 01/13/2025 1343   History of Percutaneous Coronary Intervention, Cardiac Surgery, or Angina No filed at 01/13/2025 1343   Hypertension requiring the use of medication  0.0909 filed at 01/13/2025 1343   Peripheral vascular disease 0 filed at 01/13/2025 1343   Impaired sensorium (cognitive impairement or loss, clouding, or delirium) 0 filed at 01/13/2025 1343   TIA or CVA withouy residual  deficit 0 filed at 01/13/2025 1343   Cerebrovascular accident with deficit 0 filed at 01/13/2025 1343   Modified Frailty Index Calculator .0909 filed at 01/13/2025 1343          CHADS2 Stroke Risk  Current as of 22 minutes ago        N/A 3 to 100%: High Risk   2 to < 3%: Medium Risk   0 to < 2%: Low Risk     Last Change: N/A          This score determines the patient's risk of having a stroke if the patient has atrial fibrillation.        This score is not applicable to this patient. Components are not calculated.          Revised Cardiac Risk Index      Flowsheet Row Pre-Admission Testing from 1/14/2025 in [facility]   High-Risk Surgery (Intraperitoneal, Intrathoracic,Suprainguinal vascular) 0 filed at 01/13/2025 1343   History of ischemic heart disease (History of MI, History of positive exercuse test, Current chest paint considered due to myocardial ischemia, Use of nitrate therapy, ECG with pathological Q Waves) 0 filed at 01/13/2025 1343   History of congestive heart failure (pulmonary edemia, bilateral rales or S3 gallop, Paroxysmal nocturnal dyspnea, CXR showing pulmonary vascular redistribution) 0 filed at 01/13/2025 1343   History of cerebrovascular disease (Prior TIA or stroke) 0 filed at 01/13/2025 1343   Pre-operative insulin treatment 0 filed at 01/13/2025 1343   Pre-operative creatinine>2 mg/dl 0 filed at 01/13/2025 1343   Revised Cardiac Risk Calculator 0 filed at 01/13/2025 1343          Apfel Simplified Score      Flowsheet Row Pre-Admission Testing from 1/14/2025 in [facility]   Smoking status 1 filed at 01/13/2025 1343   History of motion sickness or PONV  1 filed at 01/13/2025 1343   Use of postoperative opioids 0 filed at 01/13/2025 1343   Gender - Female 1=Yes filed at 01/13/2025 1343   Apfel Simplified Score Calculator 3 filed at 01/13/2025 1343          Risk Analysis Index Results This Encounter         1/13/2025  1344             Do you live in a place other  than your own home?: 0    When did you begin living in the place you are currently residing?: Greater than one year ago    Any kidney failure, kidney not working well, or seeing a kidney doctor (nephrologist)? If yes, was this for kidney stones or another problem?: 0 No    Any history of chronic (long-term) congestive heart failure (CHF)?: 0 No    Any shortness of breath when resting?: 0 No    In the past five years, have you been diagnosed with or treated for cancer?: No    During the last 3 months has it become difficult for you to remember things or organize your thoughts?: 0 No    Have you lost weight of 10 pounds or more in the past 3 months without trying?: 0 No    Do you have any loss of appetitie?: 0 No    Getting Around (Mobility): 0 Can get around without help    Eatin Can plan and prepare own meals    Toiletin Can use toilet without any help    Personal Hygiene (Bathing, Hand Washing, Changing Clothes): 0 Can shower or bathe without any help    NOEL Cancer History: Patient does not indicate history of cancer    Total Risk Analysis Index Score Without Cancer: 10    Total Risk Analysis Index Score: 10          Stop Bang Score      Flowsheet Row Pre-Admission Testing from 2025 in [facility] Questionnaire Series Submission from 2025 in [facility] with Generic Provider Xiomara   Do you snore loudly? 1 filed at 2025 1343 0  filed at 2025   Do you often feel tired or fatigued after your sleep? 0 filed at 2025 1343 0  filed at 2025   Has anyone ever observed you stop breathing in your sleep? 0 filed at 2025 1343 0  filed at 2025   Do you have or are you being treated for high blood pressure? 1 filed at 2025 1343 1  filed at 2025   Recent BMI (Calculated) 37.5 filed at 2025 1343 37.5  filed at 2025   Is BMI greater than 35 kg/m2? 1=Yes filed at 2025 1343 1=Yes  filed at 2025   Age  older than 50 years old? 0=No filed at 01/13/2025 1343 0=No  filed at 01/04/2025 2203   Is your neck circumference greater than 17 inches (Male) or 16 inches (Female)? 0 filed at 01/13/2025 1343 --   Gender - Male 0=No filed at 01/13/2025 1343 0=No  filed at 01/04/2025 2203   STOP-BANG Total Score 3 filed at 01/13/2025 1343 --          Prodigy: High Risk  Total Score: 0          ARISCAT Score for Postoperative Pulmonary Complications      Flowsheet Row Pre-Admission Testing from 1/14/2025 in [facility]   Age Calculated Score 0 filed at 01/13/2025 1345   Preoperative SpO2 0 filed at 01/13/2025 1345   Respiratory infection in the last month Either upper or lower (i.e., URI, bronchitis, pneumonia), with fever and antibiotic treatment 0 filed at 01/13/2025 1345   Preoperative anemia (Hgb less than 10 g/dl) 0 filed at 01/13/2025 1345   Surgical incision  0 filed at 01/13/2025 1345   Duration of surgery  23 filed at 01/13/2025 1345   Emergency Procedure  0 filed at 01/13/2025 1345   ARISCAT Total Score  23 filed at 01/13/2025 1345          James Perioperative Risk for Myocardial Infarction or Cardiac Arrest (PARKER)      Flowsheet Row Pre-Admission Testing from 1/14/2025 in [facility]   Calculated Age Score 0.88 filed at 01/13/2025 1345   Functional Status  0 filed at 01/13/2025 1345   ASA Class  -3.29 filed at 01/13/2025 1345   Creatinine 0 filed at 01/13/2025 1345   Type of Procedure  0.80 filed at 01/13/2025 1345   PARKER Total Score  -6.86 filed at 01/13/2025 1345   PARKER % 0.1 filed at 01/13/2025 1345            Assessment and Plan:     Assessment and Plan:     Preop:   OR with Dr. Gallagher on 1/17/25 for left total knee arthroplasty   Labs ordered per Dr. Gallagher. A1c on file from Dec. 2024.   Hemoglobin A1C   Date Value Ref Range Status   12/18/2024 5.7 (H) See comment % Final     EKG obtained and enclosed. NSR. VR: 77 BPM     Neurologic:   The patient is at an increased risk for post  operative delirium secondary to polypharmacy  and type and duration of surgery . Preoperative brain exercise educational handout provided to patient.  The patient is at an increased risk for perioperative stroke secondary to HTN, HLD, female sex , and general anesthesia.    Cardiac:  Hypertension: Controlled with medical management   Dyslipidemia: On statin   Duke Activity Status Index (DASI)  DASI Score: 28.7   MET Score: 6.3  RCRI  0 which is 3.9% 30 day risk of MACE (risk for cardiac death, nonfatal myocardial infarction, and nonfactal cardiac arrest)  PARKER score which indicates a   0.1% risk of intraoperative or 30-day postoperative MACE    Pulmonary:   STOP-BANG score of  3 . Intermediate  risk of obstructive sleep apnea.   ARISCAT:    23  points which is a low (1.6%) risk of in-hospital post-op pulmonary complications     GI:  Apfel: 3 points 61% risk for post operative N/V    /Renal:   OAB: Stable. No acute changes. On vesicare     Neuro-muscular:   Osteoarthritis: Reason for upcoming procedure     Hematologic:   Caprini score 9, patient at high risk for perioperative DVT. Patient provided with VTE education/handout.     Skin check: Patient was instructed to make surgeon aware of any skin changes/concerns prior to surgery.     Anesthesia: Has had nausea/vomiting in the past with anesthesia. Has had slow emergence with anesthesia in the past.     *See risk scores as previously documented

## 2025-01-15 ENCOUNTER — TELEPHONE (OUTPATIENT)
Dept: ORTHOPEDIC SURGERY | Facility: CLINIC | Age: 45
End: 2025-01-15
Payer: COMMERCIAL

## 2025-01-15 LAB
ATRIAL RATE: 77 BPM
P AXIS: 51 DEGREES
P OFFSET: 201 MS
P ONSET: 151 MS
PR INTERVAL: 132 MS
Q ONSET: 217 MS
QRS COUNT: 12 BEATS
QRS DURATION: 86 MS
QT INTERVAL: 392 MS
QTC CALCULATION(BAZETT): 443 MS
QTC FREDERICIA: 425 MS
R AXIS: 57 DEGREES
T AXIS: 25 DEGREES
T OFFSET: 413 MS
VENTRICULAR RATE: 77 BPM

## 2025-01-15 NOTE — TELEPHONE ENCOUNTER
1/15/25 - Per email from Rhys headley - Patient received the wheeled walker today that is needed for up coming surgery.   All information in regards to use was given to the patient.

## 2025-01-16 LAB — STAPHYLOCOCCUS SPEC CULT: ABNORMAL

## 2025-01-16 RX ORDER — TRANEXAMIC ACID 650 MG/1
1950 TABLET ORAL ONCE
Status: CANCELLED | OUTPATIENT
Start: 2025-01-16 | End: 2025-01-16

## 2025-01-16 RX ORDER — CELECOXIB 50 MG/1
200 CAPSULE ORAL ONCE
Status: CANCELLED | OUTPATIENT
Start: 2025-01-16 | End: 2025-01-16

## 2025-01-16 RX ORDER — CEFAZOLIN SODIUM 2 G/100ML
2 INJECTION, SOLUTION INTRAVENOUS ONCE
Status: CANCELLED | OUTPATIENT
Start: 2025-01-16 | End: 2025-01-16

## 2025-01-16 RX ORDER — ACETAMINOPHEN 325 MG/1
650 TABLET ORAL ONCE
Status: CANCELLED | OUTPATIENT
Start: 2025-01-16 | End: 2025-01-16

## 2025-01-16 NOTE — H&P
History Of Present Illness  Keiry Kline is a 44 y.o. female presenting with left knee pain.     Past Medical History  She has a past medical history of Acute frontal sinusitis (06/19/2023), Acute upper respiratory infection (06/19/2023), Ankle pain (06/19/2023), Chest pain (06/19/2023), Delayed emergence from general anesthesia, Hyperlipidemia, Hypertension, Nausea (06/19/2023), Other conditions influencing health status, Pain, wrist joint (06/19/2023), Pain, wrist joint (06/19/2023), and PONV (postoperative nausea and vomiting).    Surgical History  She has a past surgical history that includes Other surgical history (05/10/2013); Ankle surgery (05/10/2013); and Other surgical history (05/10/2013).     Social History  She reports that she has never smoked. She has never used smokeless tobacco. She reports that she does not drink alcohol and does not use drugs.    Family History  Family History   Problem Relation Name Age of Onset    Diabetes Mother      Hypertension Father          Allergies  Patient has no known allergies.    Review of Systems CONSTITUTIONAL: Denies weight loss, fever and chills.    - HEENT: Denies changes in vision and hearing.    - RESPIRATORY: Denies SOB and cough.    - CV: Denies palpitations and CP.    - GI: Denies abdominal pain, nausea, vomiting and diarrhea.    - : Denies dysuria and urinary frequency.    - MSK: See physical exam findings     - SKIN: Denies rash and pruritus.    - NEUROLOGICAL: Denies headache and syncope.    - PSYCHIATRIC: Denies recent changes in mood. Denies anxiety and depression.     Physical Exam- GENERAL: Alert and oriented x 3. No acute distress. Well-nourished.    - EYES: EOMI. Anicteric.    - HENT: Moist mucous membranes. No scleral icterus. No cervical lymphadenopathy.    - LUNGS: Clear to auscultation bilaterally. No accessory muscle use.    - CARDIOVASCULAR: Regular rate and rhythm. No murmur. No JVD.    - ABDOMEN: Soft, non-tender and non-distended. No  palpable masses.    - EXTREMITIES: Pain with limited range of motion left knee    - NEUROLOGIC: No focal neurological deficits. CN II-XII grossly intact, but not individually tested.    - PSYCHIATRIC: Cooperative. Appropriate mood and affect.     Last Recorded Vitals  There were no vitals taken for this visit.    Relevant Results      Scheduled medications    Continuous medications    PRN medications    No results found for this or any previous visit (from the past 24 hours).    Assessment/Plan   Assessment & Plan  Unilateral primary osteoarthritis, left knee      Discussed left total knee arthroplasty, nickel free, patient agreeable like to proceed.  Nickel allergy, titanium implant.       I spent 10 minutes in the professional and overall care of this patient.      Prashanth Alvarez PA-C

## 2025-01-17 ENCOUNTER — ANESTHESIA (OUTPATIENT)
Dept: OPERATING ROOM | Facility: HOSPITAL | Age: 45
End: 2025-01-17
Payer: COMMERCIAL

## 2025-01-17 ENCOUNTER — ANESTHESIA EVENT (OUTPATIENT)
Dept: OPERATING ROOM | Facility: HOSPITAL | Age: 45
End: 2025-01-17
Payer: COMMERCIAL

## 2025-01-17 ENCOUNTER — HOSPITAL ENCOUNTER (OUTPATIENT)
Facility: HOSPITAL | Age: 45
Discharge: HOME | End: 2025-01-18
Attending: ORTHOPAEDIC SURGERY | Admitting: ORTHOPAEDIC SURGERY
Payer: COMMERCIAL

## 2025-01-17 DIAGNOSIS — Z96.652 TOTAL KNEE REPLACEMENT STATUS, LEFT: ICD-10-CM

## 2025-01-17 DIAGNOSIS — M17.12 UNILATERAL PRIMARY OSTEOARTHRITIS, LEFT KNEE: Primary | ICD-10-CM

## 2025-01-17 LAB
ABO GROUP (TYPE) IN BLOOD: NORMAL
ANTIBODY SCREEN: NORMAL
HCG UR QL IA.RAPID: NEGATIVE
RH FACTOR (ANTIGEN D): NORMAL

## 2025-01-17 PROCEDURE — 2780000003 HC OR 278 NO HCPCS: Performed by: ORTHOPAEDIC SURGERY

## 2025-01-17 PROCEDURE — 36415 COLL VENOUS BLD VENIPUNCTURE: CPT | Performed by: ORTHOPAEDIC SURGERY

## 2025-01-17 PROCEDURE — C1713 ANCHOR/SCREW BN/BN,TIS/BN: HCPCS | Performed by: ORTHOPAEDIC SURGERY

## 2025-01-17 PROCEDURE — 2500000002 HC RX 250 W HCPCS SELF ADMINISTERED DRUGS (ALT 637 FOR MEDICARE OP, ALT 636 FOR OP/ED): Performed by: ORTHOPAEDIC SURGERY

## 2025-01-17 PROCEDURE — 2720000007 HC OR 272 NO HCPCS: Performed by: ORTHOPAEDIC SURGERY

## 2025-01-17 PROCEDURE — 2500000001 HC RX 250 WO HCPCS SELF ADMINISTERED DRUGS (ALT 637 FOR MEDICARE OP): Performed by: ORTHOPAEDIC SURGERY

## 2025-01-17 PROCEDURE — 2500000002 HC RX 250 W HCPCS SELF ADMINISTERED DRUGS (ALT 637 FOR MEDICARE OP, ALT 636 FOR OP/ED): Performed by: STUDENT IN AN ORGANIZED HEALTH CARE EDUCATION/TRAINING PROGRAM

## 2025-01-17 PROCEDURE — 86850 RBC ANTIBODY SCREEN: CPT | Performed by: ORTHOPAEDIC SURGERY

## 2025-01-17 PROCEDURE — 3600000005 HC OR TIME - INITIAL BASE CHARGE - PROCEDURE LEVEL FIVE: Performed by: ORTHOPAEDIC SURGERY

## 2025-01-17 PROCEDURE — 3600000010 HC OR TIME - EACH INCREMENTAL 1 MINUTE - PROCEDURE LEVEL FIVE: Performed by: ORTHOPAEDIC SURGERY

## 2025-01-17 PROCEDURE — 3700000001 HC GENERAL ANESTHESIA TIME - INITIAL BASE CHARGE: Performed by: ORTHOPAEDIC SURGERY

## 2025-01-17 PROCEDURE — 2500000004 HC RX 250 GENERAL PHARMACY W/ HCPCS (ALT 636 FOR OP/ED): Mod: JZ | Performed by: STUDENT IN AN ORGANIZED HEALTH CARE EDUCATION/TRAINING PROGRAM

## 2025-01-17 PROCEDURE — 2500000001 HC RX 250 WO HCPCS SELF ADMINISTERED DRUGS (ALT 637 FOR MEDICARE OP): Performed by: STUDENT IN AN ORGANIZED HEALTH CARE EDUCATION/TRAINING PROGRAM

## 2025-01-17 PROCEDURE — 2500000004 HC RX 250 GENERAL PHARMACY W/ HCPCS (ALT 636 FOR OP/ED): Performed by: ORTHOPAEDIC SURGERY

## 2025-01-17 PROCEDURE — C1776 JOINT DEVICE (IMPLANTABLE): HCPCS | Performed by: ORTHOPAEDIC SURGERY

## 2025-01-17 PROCEDURE — 2500000004 HC RX 250 GENERAL PHARMACY W/ HCPCS (ALT 636 FOR OP/ED)

## 2025-01-17 PROCEDURE — 81025 URINE PREGNANCY TEST: CPT | Performed by: ORTHOPAEDIC SURGERY

## 2025-01-17 PROCEDURE — 27447 TOTAL KNEE ARTHROPLASTY: CPT | Performed by: STUDENT IN AN ORGANIZED HEALTH CARE EDUCATION/TRAINING PROGRAM

## 2025-01-17 PROCEDURE — 2500000005 HC RX 250 GENERAL PHARMACY W/O HCPCS: Performed by: STUDENT IN AN ORGANIZED HEALTH CARE EDUCATION/TRAINING PROGRAM

## 2025-01-17 PROCEDURE — 97161 PT EVAL LOW COMPLEX 20 MIN: CPT | Mod: GP

## 2025-01-17 PROCEDURE — 27447 TOTAL KNEE ARTHROPLASTY: CPT | Performed by: ORTHOPAEDIC SURGERY

## 2025-01-17 PROCEDURE — 97165 OT EVAL LOW COMPLEX 30 MIN: CPT | Mod: GO

## 2025-01-17 PROCEDURE — 7100000011 HC EXTENDED STAY RECOVERY HOURLY - NURSING UNIT

## 2025-01-17 PROCEDURE — 7100000001 HC RECOVERY ROOM TIME - INITIAL BASE CHARGE: Performed by: ORTHOPAEDIC SURGERY

## 2025-01-17 PROCEDURE — 3700000002 HC GENERAL ANESTHESIA TIME - EACH INCREMENTAL 1 MINUTE: Performed by: ORTHOPAEDIC SURGERY

## 2025-01-17 PROCEDURE — 7100000002 HC RECOVERY ROOM TIME - EACH INCREMENTAL 1 MINUTE: Performed by: ORTHOPAEDIC SURGERY

## 2025-01-17 DEVICE — IMPLANTABLE DEVICE
Type: IMPLANTABLE DEVICE | Site: KNEE | Status: FUNCTIONAL
Brand: PERSONA™

## 2025-01-17 DEVICE — IMPLANTABLE DEVICE
Type: IMPLANTABLE DEVICE | Site: KNEE | Status: FUNCTIONAL
Brand: PERSONA® THE PERSONALIZED KNEE®

## 2025-01-17 DEVICE — IMPLANTABLE DEVICE
Type: IMPLANTABLE DEVICE | Site: KNEE | Status: FUNCTIONAL
Brand: BIOMET® BONE CEMENT R

## 2025-01-17 DEVICE — IMPLANTABLE DEVICE
Type: IMPLANTABLE DEVICE | Site: KNEE | Status: FUNCTIONAL
Brand: PERSONA®

## 2025-01-17 DEVICE — IMPLANTABLE DEVICE
Type: IMPLANTABLE DEVICE | Site: KNEE | Status: NON-FUNCTIONAL
Brand: NEXGEN®

## 2025-01-17 DEVICE — IMPLANTABLE DEVICE
Type: IMPLANTABLE DEVICE | Site: KNEE | Status: FUNCTIONAL
Brand: PERSONA® VIVACIT-E®

## 2025-01-17 RX ORDER — CELECOXIB 200 MG/1
200 CAPSULE ORAL ONCE
Status: COMPLETED | OUTPATIENT
Start: 2025-01-17 | End: 2025-01-17

## 2025-01-17 RX ORDER — TRANEXAMIC ACID 650 MG/1
1950 TABLET ORAL ONCE
Status: COMPLETED | OUTPATIENT
Start: 2025-01-17 | End: 2025-01-17

## 2025-01-17 RX ORDER — TRANEXAMIC ACID 650 MG/1
1950 TABLET ORAL ONCE
Status: COMPLETED | OUTPATIENT
Start: 2025-01-18 | End: 2025-01-18

## 2025-01-17 RX ORDER — OXYCODONE HYDROCHLORIDE 5 MG/1
5 TABLET ORAL EVERY 4 HOURS PRN
Status: DISCONTINUED | OUTPATIENT
Start: 2025-01-17 | End: 2025-01-18 | Stop reason: HOSPADM

## 2025-01-17 RX ORDER — CYCLOBENZAPRINE HCL 10 MG
10 TABLET ORAL 3 TIMES DAILY PRN
Status: DISCONTINUED | OUTPATIENT
Start: 2025-01-17 | End: 2025-01-18 | Stop reason: HOSPADM

## 2025-01-17 RX ORDER — OXYCODONE HYDROCHLORIDE 10 MG/1
10 TABLET ORAL EVERY 4 HOURS PRN
Status: DISCONTINUED | OUTPATIENT
Start: 2025-01-17 | End: 2025-01-18 | Stop reason: HOSPADM

## 2025-01-17 RX ORDER — MIDAZOLAM HYDROCHLORIDE 1 MG/ML
INJECTION, SOLUTION INTRAMUSCULAR; INTRAVENOUS AS NEEDED
Status: DISCONTINUED | OUTPATIENT
Start: 2025-01-17 | End: 2025-01-17

## 2025-01-17 RX ORDER — CEFAZOLIN SODIUM 2 G/100ML
2 INJECTION, SOLUTION INTRAVENOUS EVERY 8 HOURS
Status: COMPLETED | OUTPATIENT
Start: 2025-01-17 | End: 2025-01-18

## 2025-01-17 RX ORDER — ONDANSETRON HYDROCHLORIDE 2 MG/ML
INJECTION, SOLUTION INTRAVENOUS AS NEEDED
Status: DISCONTINUED | OUTPATIENT
Start: 2025-01-17 | End: 2025-01-17

## 2025-01-17 RX ORDER — NALOXONE HYDROCHLORIDE 0.4 MG/ML
0.2 INJECTION, SOLUTION INTRAMUSCULAR; INTRAVENOUS; SUBCUTANEOUS EVERY 5 MIN PRN
Status: DISCONTINUED | OUTPATIENT
Start: 2025-01-17 | End: 2025-01-18 | Stop reason: HOSPADM

## 2025-01-17 RX ORDER — ALBUTEROL SULFATE 0.83 MG/ML
2.5 SOLUTION RESPIRATORY (INHALATION) ONCE AS NEEDED
Status: DISCONTINUED | OUTPATIENT
Start: 2025-01-17 | End: 2025-01-17 | Stop reason: HOSPADM

## 2025-01-17 RX ORDER — ACETAMINOPHEN 325 MG/1
650 TABLET ORAL ONCE
Status: COMPLETED | OUTPATIENT
Start: 2025-01-17 | End: 2025-01-17

## 2025-01-17 RX ORDER — FENTANYL CITRATE 50 UG/ML
INJECTION, SOLUTION INTRAMUSCULAR; INTRAVENOUS AS NEEDED
Status: DISCONTINUED | OUTPATIENT
Start: 2025-01-17 | End: 2025-01-17

## 2025-01-17 RX ORDER — MEPERIDINE HYDROCHLORIDE 50 MG/ML
12.5 INJECTION INTRAMUSCULAR; INTRAVENOUS; SUBCUTANEOUS EVERY 10 MIN PRN
Status: DISCONTINUED | OUTPATIENT
Start: 2025-01-17 | End: 2025-01-17 | Stop reason: HOSPADM

## 2025-01-17 RX ORDER — PROPOFOL 10 MG/ML
INJECTION, EMULSION INTRAVENOUS AS NEEDED
Status: DISCONTINUED | OUTPATIENT
Start: 2025-01-17 | End: 2025-01-17

## 2025-01-17 RX ORDER — ENOXAPARIN SODIUM 100 MG/ML
40 INJECTION SUBCUTANEOUS DAILY
Status: DISCONTINUED | OUTPATIENT
Start: 2025-01-18 | End: 2025-01-18 | Stop reason: HOSPADM

## 2025-01-17 RX ORDER — ROPIVACAINE/EPI/CLONIDINE/KET 2.46-0.005
SYRINGE (ML) INJECTION AS NEEDED
Status: DISCONTINUED | OUTPATIENT
Start: 2025-01-17 | End: 2025-01-17 | Stop reason: HOSPADM

## 2025-01-17 RX ORDER — MORPHINE SULFATE 2 MG/ML
2 INJECTION, SOLUTION INTRAMUSCULAR; INTRAVENOUS EVERY 2 HOUR PRN
Status: DISCONTINUED | OUTPATIENT
Start: 2025-01-17 | End: 2025-01-18 | Stop reason: HOSPADM

## 2025-01-17 RX ORDER — LIDOCAINE HYDROCHLORIDE 20 MG/ML
INJECTION, SOLUTION EPIDURAL; INFILTRATION; INTRACAUDAL; PERINEURAL AS NEEDED
Status: DISCONTINUED | OUTPATIENT
Start: 2025-01-17 | End: 2025-01-17

## 2025-01-17 RX ORDER — OXYCODONE HYDROCHLORIDE 10 MG/1
10 TABLET ORAL EVERY 6 HOURS PRN
Status: DISCONTINUED | OUTPATIENT
Start: 2025-01-17 | End: 2025-01-17

## 2025-01-17 RX ORDER — MIDAZOLAM HYDROCHLORIDE 1 MG/ML
1 INJECTION, SOLUTION INTRAMUSCULAR; INTRAVENOUS ONCE AS NEEDED
Status: DISCONTINUED | OUTPATIENT
Start: 2025-01-17 | End: 2025-01-17 | Stop reason: HOSPADM

## 2025-01-17 RX ORDER — HYDRALAZINE HYDROCHLORIDE 20 MG/ML
5 INJECTION INTRAMUSCULAR; INTRAVENOUS EVERY 30 MIN PRN
Status: DISCONTINUED | OUTPATIENT
Start: 2025-01-17 | End: 2025-01-17 | Stop reason: HOSPADM

## 2025-01-17 RX ORDER — KETOROLAC TROMETHAMINE 30 MG/ML
30 INJECTION, SOLUTION INTRAMUSCULAR; INTRAVENOUS EVERY 6 HOURS
Status: COMPLETED | OUTPATIENT
Start: 2025-01-17 | End: 2025-01-18

## 2025-01-17 RX ORDER — ONDANSETRON HYDROCHLORIDE 2 MG/ML
4 INJECTION, SOLUTION INTRAVENOUS EVERY 8 HOURS PRN
Status: DISCONTINUED | OUTPATIENT
Start: 2025-01-17 | End: 2025-01-18 | Stop reason: HOSPADM

## 2025-01-17 RX ORDER — ATORVASTATIN CALCIUM 10 MG/1
10 TABLET, FILM COATED ORAL DAILY
Status: DISCONTINUED | OUTPATIENT
Start: 2025-01-17 | End: 2025-01-18 | Stop reason: HOSPADM

## 2025-01-17 RX ORDER — LIDOCAINE HYDROCHLORIDE 10 MG/ML
0.1 INJECTION, SOLUTION INFILTRATION; PERINEURAL ONCE
Status: DISCONTINUED | OUTPATIENT
Start: 2025-01-17 | End: 2025-01-17 | Stop reason: HOSPADM

## 2025-01-17 RX ORDER — WATER
20 LIQUID (ML) MISCELLANEOUS
Status: DISCONTINUED | OUTPATIENT
Start: 2025-01-17 | End: 2025-01-18 | Stop reason: HOSPADM

## 2025-01-17 RX ORDER — CEFAZOLIN SODIUM 2 G/100ML
2 INJECTION, SOLUTION INTRAVENOUS ONCE
Status: COMPLETED | OUTPATIENT
Start: 2025-01-17 | End: 2025-01-17

## 2025-01-17 RX ORDER — DIPHENHYDRAMINE HYDROCHLORIDE 50 MG/ML
12.5 INJECTION INTRAMUSCULAR; INTRAVENOUS ONCE AS NEEDED
Status: DISCONTINUED | OUTPATIENT
Start: 2025-01-17 | End: 2025-01-17 | Stop reason: HOSPADM

## 2025-01-17 RX ORDER — HYDROMORPHONE HYDROCHLORIDE 1 MG/ML
1 INJECTION, SOLUTION INTRAMUSCULAR; INTRAVENOUS; SUBCUTANEOUS EVERY 5 MIN PRN
Status: DISCONTINUED | OUTPATIENT
Start: 2025-01-17 | End: 2025-01-17 | Stop reason: HOSPADM

## 2025-01-17 RX ORDER — BISACODYL 5 MG
10 TABLET, DELAYED RELEASE (ENTERIC COATED) ORAL DAILY PRN
Status: DISCONTINUED | OUTPATIENT
Start: 2025-01-17 | End: 2025-01-18 | Stop reason: HOSPADM

## 2025-01-17 RX ORDER — ACETAMINOPHEN 325 MG/1
975 TABLET ORAL ONCE
Status: DISCONTINUED | OUTPATIENT
Start: 2025-01-17 | End: 2025-01-17 | Stop reason: HOSPADM

## 2025-01-17 RX ORDER — FENTANYL CITRATE 50 UG/ML
12.5 INJECTION, SOLUTION INTRAMUSCULAR; INTRAVENOUS EVERY 5 MIN PRN
Status: DISCONTINUED | OUTPATIENT
Start: 2025-01-17 | End: 2025-01-17 | Stop reason: HOSPADM

## 2025-01-17 RX ORDER — SODIUM CHLORIDE, SODIUM LACTATE, POTASSIUM CHLORIDE, CALCIUM CHLORIDE 600; 310; 30; 20 MG/100ML; MG/100ML; MG/100ML; MG/100ML
100 INJECTION, SOLUTION INTRAVENOUS CONTINUOUS
Status: DISCONTINUED | OUTPATIENT
Start: 2025-01-17 | End: 2025-01-17 | Stop reason: HOSPADM

## 2025-01-17 RX ORDER — ONDANSETRON HYDROCHLORIDE 2 MG/ML
4 INJECTION, SOLUTION INTRAVENOUS ONCE AS NEEDED
Status: DISCONTINUED | OUTPATIENT
Start: 2025-01-17 | End: 2025-01-17 | Stop reason: HOSPADM

## 2025-01-17 RX ORDER — ACETAMINOPHEN 325 MG/1
650 TABLET ORAL EVERY 6 HOURS SCHEDULED
Status: DISCONTINUED | OUTPATIENT
Start: 2025-01-17 | End: 2025-01-18 | Stop reason: HOSPADM

## 2025-01-17 RX ORDER — DOCUSATE SODIUM 100 MG/1
100 CAPSULE, LIQUID FILLED ORAL 2 TIMES DAILY
Status: DISCONTINUED | OUTPATIENT
Start: 2025-01-17 | End: 2025-01-18 | Stop reason: HOSPADM

## 2025-01-17 RX ORDER — ONDANSETRON 4 MG/1
4 TABLET, ORALLY DISINTEGRATING ORAL EVERY 8 HOURS PRN
Status: DISCONTINUED | OUTPATIENT
Start: 2025-01-17 | End: 2025-01-18 | Stop reason: HOSPADM

## 2025-01-17 RX ORDER — VALSARTAN 160 MG/1
80 TABLET ORAL DAILY
Status: DISCONTINUED | OUTPATIENT
Start: 2025-01-17 | End: 2025-01-18 | Stop reason: HOSPADM

## 2025-01-17 RX ORDER — OXYCODONE HYDROCHLORIDE 5 MG/1
5 TABLET ORAL EVERY 4 HOURS PRN
Status: DISCONTINUED | OUTPATIENT
Start: 2025-01-17 | End: 2025-01-17 | Stop reason: HOSPADM

## 2025-01-17 RX ADMIN — PROPOFOL 20 MG: 10 INJECTION, EMULSION INTRAVENOUS at 09:55

## 2025-01-17 RX ADMIN — KETOROLAC TROMETHAMINE 30 MG: 30 INJECTION, SOLUTION INTRAMUSCULAR at 14:07

## 2025-01-17 RX ADMIN — MIDAZOLAM 2 MG: 1 INJECTION INTRAMUSCULAR; INTRAVENOUS at 09:19

## 2025-01-17 RX ADMIN — Medication 20 ML: at 20:21

## 2025-01-17 RX ADMIN — OXYCODONE HYDROCHLORIDE 5 MG: 5 TABLET ORAL at 18:05

## 2025-01-17 RX ADMIN — FENTANYL CITRATE 25 MCG: 50 INJECTION, SOLUTION INTRAMUSCULAR; INTRAVENOUS at 09:42

## 2025-01-17 RX ADMIN — ACETAMINOPHEN 650 MG: 325 TABLET ORAL at 14:07

## 2025-01-17 RX ADMIN — DEXAMETHASONE SODIUM PHOSPHATE 4 MG: 4 INJECTION, SOLUTION INTRAMUSCULAR; INTRAVENOUS at 09:44

## 2025-01-17 RX ADMIN — VALSARTAN 80 MG: 160 TABLET, FILM COATED ORAL at 14:07

## 2025-01-17 RX ADMIN — FENTANYL CITRATE 25 MCG: 50 INJECTION, SOLUTION INTRAMUSCULAR; INTRAVENOUS at 10:49

## 2025-01-17 RX ADMIN — ACETAMINOPHEN 650 MG: 325 TABLET ORAL at 20:13

## 2025-01-17 RX ADMIN — CEFAZOLIN SODIUM 2 G: 2 INJECTION, SOLUTION INTRAVENOUS at 18:05

## 2025-01-17 RX ADMIN — ATORVASTATIN CALCIUM 10 MG: 10 TABLET, FILM COATED ORAL at 20:13

## 2025-01-17 RX ADMIN — ONDANSETRON 4 MG: 2 INJECTION INTRAMUSCULAR; INTRAVENOUS at 09:44

## 2025-01-17 RX ADMIN — Medication 20 ML: at 20:20

## 2025-01-17 RX ADMIN — TRANEXAMIC ACID 1950 MG: 650 TABLET ORAL at 18:04

## 2025-01-17 RX ADMIN — PROPOFOL 70 MG: 10 INJECTION, EMULSION INTRAVENOUS at 09:27

## 2025-01-17 RX ADMIN — Medication 20 ML: at 21:29

## 2025-01-17 RX ADMIN — ACETAMINOPHEN 650 MG: 325 TABLET ORAL at 23:42

## 2025-01-17 RX ADMIN — PROPOFOL 100 MCG/KG/MIN: 10 INJECTION, EMULSION INTRAVENOUS at 09:28

## 2025-01-17 RX ADMIN — CELECOXIB 200 MG: 100 CAPSULE ORAL at 07:59

## 2025-01-17 RX ADMIN — OXYCODONE HYDROCHLORIDE 5 MG: 5 TABLET ORAL at 23:43

## 2025-01-17 RX ADMIN — DOCUSATE SODIUM 100 MG: 100 CAPSULE, LIQUID FILLED ORAL at 20:13

## 2025-01-17 RX ADMIN — Medication 20 ML: at 16:36

## 2025-01-17 RX ADMIN — Medication 20 ML: at 15:52

## 2025-01-17 RX ADMIN — CEFAZOLIN SODIUM 2 G: 2 INJECTION, SOLUTION INTRAVENOUS at 09:31

## 2025-01-17 RX ADMIN — Medication 20 ML: at 19:00

## 2025-01-17 RX ADMIN — TRANEXAMIC ACID 1950 MG: 650 TABLET ORAL at 07:58

## 2025-01-17 RX ADMIN — POVIDONE-IODINE 1 APPLICATION: 5 SOLUTION TOPICAL at 07:58

## 2025-01-17 RX ADMIN — Medication 20 ML: at 18:40

## 2025-01-17 RX ADMIN — Medication 20 ML: at 17:00

## 2025-01-17 RX ADMIN — KETOROLAC TROMETHAMINE 30 MG: 30 INJECTION, SOLUTION INTRAMUSCULAR at 20:13

## 2025-01-17 RX ADMIN — LIDOCAINE HYDROCHLORIDE 60 MG: 20 INJECTION, SOLUTION EPIDURAL; INFILTRATION; INTRACAUDAL; PERINEURAL at 09:28

## 2025-01-17 RX ADMIN — ACETAMINOPHEN 650 MG: 325 TABLET ORAL at 07:59

## 2025-01-17 RX ADMIN — SODIUM CHLORIDE, POTASSIUM CHLORIDE, SODIUM LACTATE AND CALCIUM CHLORIDE: 600; 310; 30; 20 INJECTION, SOLUTION INTRAVENOUS at 09:19

## 2025-01-17 SDOH — SOCIAL STABILITY: SOCIAL INSECURITY: DOES ANYONE TRY TO KEEP YOU FROM HAVING/CONTACTING OTHER FRIENDS OR DOING THINGS OUTSIDE YOUR HOME?: NO

## 2025-01-17 SDOH — ECONOMIC STABILITY: HOUSING INSECURITY: DO YOU FEEL UNSAFE GOING BACK TO THE PLACE WHERE YOU LIVE?: NO

## 2025-01-17 SDOH — SOCIAL STABILITY: SOCIAL INSECURITY: ARE YOU OR HAVE YOU BEEN THREATENED OR ABUSED PHYSICALLY, EMOTIONALLY, OR SEXUALLY BY ANYONE?: NO

## 2025-01-17 SDOH — SOCIAL STABILITY: SOCIAL INSECURITY: ABUSE: ADULT

## 2025-01-17 SDOH — ECONOMIC STABILITY: FOOD INSECURITY: WITHIN THE PAST 12 MONTHS, THE FOOD YOU BOUGHT JUST DIDN'T LAST AND YOU DIDN'T HAVE MONEY TO GET MORE.: NEVER TRUE

## 2025-01-17 SDOH — ECONOMIC STABILITY: HOUSING INSECURITY: AT ANY TIME IN THE PAST 12 MONTHS, WERE YOU HOMELESS OR LIVING IN A SHELTER (INCLUDING NOW)?: NO

## 2025-01-17 SDOH — SOCIAL STABILITY: SOCIAL INSECURITY: WITHIN THE LAST YEAR, HAVE YOU BEEN AFRAID OF YOUR PARTNER OR EX-PARTNER?: NO

## 2025-01-17 SDOH — ECONOMIC STABILITY: INCOME INSECURITY: IN THE PAST 12 MONTHS HAS THE ELECTRIC, GAS, OIL, OR WATER COMPANY THREATENED TO SHUT OFF SERVICES IN YOUR HOME?: NO

## 2025-01-17 SDOH — SOCIAL STABILITY: SOCIAL INSECURITY: HAVE YOU HAD ANY THOUGHTS OF HARMING ANYONE ELSE?: NO

## 2025-01-17 SDOH — SOCIAL STABILITY: SOCIAL INSECURITY
WITHIN THE LAST YEAR, HAVE YOU BEEN RAPED OR FORCED TO HAVE ANY KIND OF SEXUAL ACTIVITY BY YOUR PARTNER OR EX-PARTNER?: NO

## 2025-01-17 SDOH — ECONOMIC STABILITY: HOUSING INSECURITY: IN THE LAST 12 MONTHS, WAS THERE A TIME WHEN YOU WERE NOT ABLE TO PAY THE MORTGAGE OR RENT ON TIME?: NO

## 2025-01-17 SDOH — ECONOMIC STABILITY: FOOD INSECURITY: HOW HARD IS IT FOR YOU TO PAY FOR THE VERY BASICS LIKE FOOD, HOUSING, MEDICAL CARE, AND HEATING?: NOT HARD AT ALL

## 2025-01-17 SDOH — SOCIAL STABILITY: SOCIAL INSECURITY: WITHIN THE LAST YEAR, HAVE YOU BEEN HUMILIATED OR EMOTIONALLY ABUSED IN OTHER WAYS BY YOUR PARTNER OR EX-PARTNER?: NO

## 2025-01-17 SDOH — SOCIAL STABILITY: SOCIAL INSECURITY: HAVE YOU HAD THOUGHTS OF HARMING ANYONE ELSE?: NO

## 2025-01-17 SDOH — ECONOMIC STABILITY: FOOD INSECURITY: WITHIN THE PAST 12 MONTHS, YOU WORRIED THAT YOUR FOOD WOULD RUN OUT BEFORE YOU GOT THE MONEY TO BUY MORE.: NEVER TRUE

## 2025-01-17 SDOH — SOCIAL STABILITY: SOCIAL INSECURITY: DO YOU FEEL UNSAFE GOING BACK TO THE PLACE WHERE YOU ARE LIVING?: NO

## 2025-01-17 SDOH — SOCIAL STABILITY: SOCIAL INSECURITY
WITHIN THE LAST YEAR, HAVE YOU BEEN KICKED, HIT, SLAPPED, OR OTHERWISE PHYSICALLY HURT BY YOUR PARTNER OR EX-PARTNER?: NO

## 2025-01-17 SDOH — SOCIAL STABILITY: SOCIAL INSECURITY: WERE YOU ABLE TO COMPLETE ALL THE BEHAVIORAL HEALTH SCREENINGS?: YES

## 2025-01-17 SDOH — SOCIAL STABILITY: SOCIAL INSECURITY: DO YOU FEEL ANYONE HAS EXPLOITED OR TAKEN ADVANTAGE OF YOU FINANCIALLY OR OF YOUR PERSONAL PROPERTY?: NO

## 2025-01-17 SDOH — SOCIAL STABILITY: SOCIAL INSECURITY: HAS ANYONE EVER THREATENED TO HURT YOUR FAMILY OR YOUR PETS?: NO

## 2025-01-17 SDOH — SOCIAL STABILITY: SOCIAL INSECURITY: ARE THERE ANY APPARENT SIGNS OF INJURIES/BEHAVIORS THAT COULD BE RELATED TO ABUSE/NEGLECT?: NO

## 2025-01-17 SDOH — ECONOMIC STABILITY: TRANSPORTATION INSECURITY: IN THE PAST 12 MONTHS, HAS LACK OF TRANSPORTATION KEPT YOU FROM MEDICAL APPOINTMENTS OR FROM GETTING MEDICATIONS?: NO

## 2025-01-17 ASSESSMENT — PAIN SCALES - GENERAL
PAINLEVEL_OUTOF10: 0 - NO PAIN
PAINLEVEL_OUTOF10: 3
PAINLEVEL_OUTOF10: 0 - NO PAIN
PAINLEVEL_OUTOF10: 0 - NO PAIN
PAINLEVEL_OUTOF10: 2
PAINLEVEL_OUTOF10: 0 - NO PAIN
PAINLEVEL_OUTOF10: 7
PAIN_LEVEL: 0
PAINLEVEL_OUTOF10: 0 - NO PAIN
PAINLEVEL_OUTOF10: 2
PAINLEVEL_OUTOF10: 0 - NO PAIN

## 2025-01-17 ASSESSMENT — COGNITIVE AND FUNCTIONAL STATUS - GENERAL
HELP NEEDED FOR BATHING: A LOT
STANDING UP FROM CHAIR USING ARMS: A LOT
TOILETING: A LITTLE
DAILY ACTIVITIY SCORE: 18
MOVING TO AND FROM BED TO CHAIR: A LOT
TURNING FROM BACK TO SIDE WHILE IN FLAT BAD: A LITTLE
MOBILITY SCORE: 24
MOVING FROM LYING ON BACK TO SITTING ON SIDE OF FLAT BED WITH BEDRAILS: A LITTLE
MOBILITY SCORE: 12
DRESSING REGULAR LOWER BODY CLOTHING: A LOT
CLIMB 3 TO 5 STEPS WITH RAILING: A LITTLE
DAILY ACTIVITIY SCORE: 24
DAILY ACTIVITIY SCORE: 24
WALKING IN HOSPITAL ROOM: TOTAL
CLIMB 3 TO 5 STEPS WITH RAILING: TOTAL
MOBILITY SCORE: 23
PATIENT BASELINE BEDBOUND: NO
DRESSING REGULAR UPPER BODY CLOTHING: A LITTLE

## 2025-01-17 ASSESSMENT — PATIENT HEALTH QUESTIONNAIRE - PHQ9
1. LITTLE INTEREST OR PLEASURE IN DOING THINGS: NOT AT ALL
SUM OF ALL RESPONSES TO PHQ9 QUESTIONS 1 & 2: 0
2. FEELING DOWN, DEPRESSED OR HOPELESS: NOT AT ALL

## 2025-01-17 ASSESSMENT — ACTIVITIES OF DAILY LIVING (ADL)
JUDGMENT_ADEQUATE_SAFELY_COMPLETE_DAILY_ACTIVITIES: YES
WALKS IN HOME: INDEPENDENT
PATIENT'S MEMORY ADEQUATE TO SAFELY COMPLETE DAILY ACTIVITIES?: YES
ADL_ASSISTANCE: INDEPENDENT
HEARING - LEFT EAR: FUNCTIONAL
HEARING - RIGHT EAR: FUNCTIONAL
DRESSING YOURSELF: INDEPENDENT
LACK_OF_TRANSPORTATION: NO
ADL_ASSISTANCE: INDEPENDENT
LACK_OF_TRANSPORTATION: NO
BATHING: INDEPENDENT
FEEDING YOURSELF: INDEPENDENT
GROOMING: INDEPENDENT
ADEQUATE_TO_COMPLETE_ADL: YES
TOILETING: INDEPENDENT

## 2025-01-17 ASSESSMENT — LIFESTYLE VARIABLES
SKIP TO QUESTIONS 9-10: 1
AUDIT-C TOTAL SCORE: 0
AUDIT-C TOTAL SCORE: 0
HOW OFTEN DO YOU HAVE 6 OR MORE DRINKS ON ONE OCCASION: NEVER
HOW OFTEN DO YOU HAVE A DRINK CONTAINING ALCOHOL: NEVER
HOW MANY STANDARD DRINKS CONTAINING ALCOHOL DO YOU HAVE ON A TYPICAL DAY: PATIENT DOES NOT DRINK

## 2025-01-17 ASSESSMENT — COLUMBIA-SUICIDE SEVERITY RATING SCALE - C-SSRS
1. IN THE PAST MONTH, HAVE YOU WISHED YOU WERE DEAD OR WISHED YOU COULD GO TO SLEEP AND NOT WAKE UP?: NO
2. HAVE YOU ACTUALLY HAD ANY THOUGHTS OF KILLING YOURSELF?: NO
6. HAVE YOU EVER DONE ANYTHING, STARTED TO DO ANYTHING, OR PREPARED TO DO ANYTHING TO END YOUR LIFE?: NO

## 2025-01-17 ASSESSMENT — PAIN - FUNCTIONAL ASSESSMENT
PAIN_FUNCTIONAL_ASSESSMENT: 0-10

## 2025-01-17 ASSESSMENT — PAIN DESCRIPTION - LOCATION
LOCATION: KNEE
LOCATION: KNEE

## 2025-01-17 ASSESSMENT — PAIN DESCRIPTION - ORIENTATION: ORIENTATION: LEFT

## 2025-01-17 NOTE — OP NOTE
Operative Note     Date: 2025  OR Location: [facility]    Name: Keiry Kline, : 1980, Age: 44 y.o., MRN: [id number], Sex: female    Diagnosis  Pre-op Diagnosis      * Unilateral primary osteoarthritis, left knee [M17.12] Post-op Diagnosis     * Unilateral primary osteoarthritis, left knee [M17.12]     Procedures  Left total knee arthroplasty    Surgeons      * Carlitos Gallagher - Primary    Resident/Fellow/Other Assistant:  Prashanth Alvarez PA-C     Staff:   Surgical Assistant:   Circulator: Mariel Deng Person: Tamy Ritchie Circulator: Patrick    Anesthesia Staff: Anesthesiologist: Garfield Phipps DO  CRNA: JESÚS Robles-RAJ  SRNA: William Duffy    Procedure Summary  Anesthesia: Anesthesia type not filed in the log.  ASA: III  Estimated Blood Loss: 50 mL  Intra-op Medications:   Administrations occurring from 0945 to 1150 on 25:   Medication Name Total Dose   ropivacaine-epinephrine-clonidine-ketorolac 2.46-0.005- 0.0008-0.3mg/mL periarticular syringe 100 mL   propofol (Diprivan) injection 10 mg/mL 1,080.2 mg              Anesthesia Record               Intraprocedure I/O Totals       None           Specimen: No specimens collected                Implants:    Implants       Type Name Action Serial No.      Joint Knee CEMENT, BONE, BIOMET R, 1X40 - [id number] Implanted      Screw DRILL PIN, TROCAR, HEADLESS - [id number] Used, Not Implanted      Joint SCREW, FEMALE 25MM, PSN 2.5MM - [id number] Implanted      Joint SCREW, FEMALE 25MM, PSN 2.5MM - [id number] Implanted      Joint FEMORAL, PSN, CR CMT TIVE, STD, SZ 8 LEFT - [id number] Implanted      Joint Knee ARTICULATE SURFACE, PSN MC VE, 11MM, 8-11 EF, LEFT - [id number] Implanted       PERSONA SYSTEM 0 DEGREE LEFT SIZE E CEMENTED TIBIA Implanted      Joint Knee PATELLA, ALL POLY VE, 32MM - [id number] Implanted               Findings: see procedure details    Indications:     The patient was seen in the preoperative area. The risks,  benefits, complications, treatment options, non-operative alternatives, expected recovery and outcomes were discussed with the patient. The possibilities of reaction to medication, pulmonary aspiration, injury to surrounding structures, bleeding, recurrent infection, the need for additional procedures, failure to diagnose a condition, and creating a complication requiring transfusion or operation were discussed with the patient. The patient concurred with the proposed plan, giving informed consent.  The site of surgery was properly noted/marked if necessary per policy. The patient has been actively warmed in preoperative area. Preoperative antibiotics have been ordered and given within 1 hours of incision. Venous thrombosis prophylaxis have been ordered.    The patient failed multiple attempts at non-surgical management.  Specific to TKA we discussed the risks of infection / revision surgery, DVT/PE, medical complications, stiffness / loss of motion, neurologic (foot drop) or vascular injury.    Procedure Details:     Patient was met prior to surgery in pre-operative holding.  The appropriate extremity was marked and recent health status was reviewed and we found no contraindication to proceeding with the scheduled procedure.  We again reviewed the risks of infection, wound issues, deep venous thrombosis, pulmonary embolism, medical complications including cardiac and pulmonary, neurologic and vascular injury and incomplete relief of pre-operative pain.    The patient discussed regional anesthesia in pre-op holding.  The patient was transported to the operating room.  A thigh tourniquet was placed and a small bump on the buttock.  The patient was resting comfortably in a supine position with all anderson prominences well padded.  Sequential compression device was placed on the contralateral lower extremity.  An exam under anesthesia was performed to evaluate the patient´s range of motion and ligamentous  integrity.    The patient was prepped and draped in the usual sterile fashion.  The leg was placed in a well padded leg merlos.  After prep, drape, antibiotic and time out, the leg was exsanguinated and the tourniquet inflated.  A longitudinal incision was made over the anterior knee.  The dissection was carried out through the skin and subcutaneous tissue.  A medial parapatellar arthrotomy was performed.  An effusion and synovitis was noted compatible with the grade IV changes of the articular cartilage.  The fat pad was slightly de-bulked, Sandwich´s line was marked and some of the deep medial collateral ligament was released from the tibia.  The ACL was resected. The knee was flexed up and medial and lateral retractors were placed to protect the collateral ligaments and popliteus tendon.      Due to the degree of deformity and stiffness, the posterior cruciate ligament was resected.    A canal finder reamer was utilized to gain entry into the femur.  An intramedullary cierra was placed by hand and set to 5 degrees of valgus for the distal femoral cut.  The cutting block was placed and the distal femoral cut was performed.   A sizing guide was then placed and the femoral size was measured based on posterior referencing.  The 4-in-1 cutting block was placed set to 3 degrees of external rotation.  The rotation was confirmed based on the transepicondylar axis and Sandwich´s line.  There was no significant hypoplasia of the posterior condyles.     Once the cutting block was placed the cuts were performed: anterior, posterior and chamfer cuts.  There collaterals were protected and the bone was removed.  There was no notching of the femur.   Once the femoral cuts were complete, we turned our attention to the tibia.  Retractors were placed medial, lateral and posteriorly.  An extramedullary alignment cierra was used and the slope was set in accordance with the implant.  We measured 2 mm of resection from the lowest point on  the tibia.  The tibial cut was completed with care not to encroach posterior to the knee joint.    After the tibial cut was completed, we removed the remaining menisci.  Using a curved osteotome posterior osteophytes were carefully removed from the femur.  We then assessed the flexion and extension gaps using trial spacer blocks.    The gaps appeared symmetric with a spacer and we proceeded to placing trial implants.  With symmetric gaps we progressed to placing trial implants.  The final poly was a 11 MC.    A trial tibial component was placed with care to avoid overhang.  The rotation was set in line with the medial third of the tibial tubercle.  A trial femoral component was then placed, followed by a trial articular surface.  The knee was taken through range of motion with the provisional components.  The flexion and extension gaps were evaluated, as well as the patellar tracking and mid-flexion stability.  The following soft tissue balancing, recuts, and/or modifications were required: release of additional deep MCL off the tibia.      The patella was everted and ~9 mm of bone were removed from the thickest portion using a clamp/cutting guide.  The lug holes were drilled in the patella and femur.  The tibia was prepared with the drill and broach after confirmation of alignment using a drop cierra.   The sclerotic areas of the bone were drilled using a small drill bit.  The capsule around the knee was injected using a long acting local anesthetic / multimodal pain mixture.    The cement was mixed in a vacuum sealed fashion while the bone was pulsatile lavaged.   The bone was dried and the implants were placed with a gentle posterior directed force and a clamp on the patella.  The excess cement was removed.   After the cement dried the gap balancing was reassessed prior to placing the final articular surface.  The proud cement mantle was removed using a small osteotome.  The knee was then copiously lavaged with  sterile saline and Irrisept (0.05% chlorhexidine gluconate).  The tourniquet was taken down and hemostasis was obtained using Bovie electrocautery and tranexamic acid (per protocol).  No significant bleeders were encountered and the usage of a drain was not felt to be necessary.    A layered closure was performed using 1 Vicryl and 1 Stratafix in the retinacular layer and quadriceps tendon.  2-0 vicryl in the deep dermal layer and monofilament in the skin.  An adherent, water proof dressing was placed followed by Webril and an ace bandage. All counts were reported as correct.  The patient was stable to the post anesthesia care unit.    I was present and participated in the entire procedure. The Physician Assistant participated in all critical elements of the procedure under my direct supervision. The surgical incision was closed by the PA under my indirect supervision. There were no qualified residents available to assist.    The physician assistant was present for the entire case.  Given the nature of the procedure and disease process, a skilled surgical assistant was necessary for the case.  The assistant was necessary for retraction and helped directly facilitate completion of the surgery.  A certified scrub tech was at the back table managing instruments and supplies for the surgical procedure.    Complications:  None; patient tolerated the procedure well.    Disposition: PACU - hemodynamically stable.  Condition: stable         Carlitos Gallagher  Phone Number: [phone]

## 2025-01-17 NOTE — PROGRESS NOTES
01/17/25 1533   Discharge Planning   Living Arrangements Spouse/significant other;Children   Support Systems Spouse/significant other;Children   Type of Residence Private residence   Home or Post Acute Services In home services   Type of Home Care Services Home PT   Expected Discharge Disposition Home H   Patient Choice   Patient / Family choosing to utilize agency / facility established prior to hospitalization Yes     Met with patient and spouse at bedside. Admitted for L TKA. Pt lives with spouse and was independent PTA. Pt has a walker. PCP is Eusebio Aceves. PT [facility] 12 OT 18. Pt plans to return home with [facility]. Internal referral needed.

## 2025-01-17 NOTE — DISCHARGE INSTRUCTIONS
"    Knee Replacement Discharge Instructions:   Dr. Carlitos Gallagher    Physical Therapy / Range of Motion:     Physical therapy should be done daily after surgery.  This can be accomplished at a rehab facility, outpatient therapy, or at home.    If you find that either at home or in a rehabilitation facility, you are not receiving the appropriate amount of therapy, please call our office immediately. Therapy CANNOT be postponed or delayed!      Knee replacements should ideally achieve 90 degrees of flexion by 2 weeks from surgery.  Ask your therapist after each session \"What is my RANGE OF MOTION?\"  Your physician will be asking you this question at your first post-operative appointment, and each following appointment.  It is also important to work on getting the knee straight and at rest attempt to keep the knee as straight as possible.    Discharge to rehab:  If you are discharged to a SNF or rehabilitation facility, discharge to home is determined by the specific staff at the facility when they deem you are safe to return home.  Your surgeon and his staff are not allowed to make this decision.  Please inquire with the facility therapist, , and medical personnel.      Discharge to home:  Therapy will begin at home and progress to an outpatient setting (ideally around 2 weeks from surgery). Despite the convenience of home therapy, Dr. Gallagher recommends starting outpatient therapy as soon as possible after surgery.    Medications:   You have been prescribed medication to prevent blood clots (either aspirin or a stronger blood thinner). This medication should be continued for 4 weeks from surgery or longer if on blood thinners prior.   - In addition please wear the BRENDA hose stockings that you were given to help prevent blood clots for two weeks postoperatively, and do your exercises after surgery as these will help reduce your risk of blood clots.     2.    A prescription will be given to you upon discharge " for a pain medicine.  It is common to take your pain medication one hour before scheduled physical therapy. This will allow you to achieve the necessary goals.   - If you need a refill, please notify the office at LEAST 48 hours before you will be out of your medication.  Pain medications cannot be called in to a pharmacy and require authentication that can only be done during normal business hours (8-5 on Mon-Fri).   We cannot refill pain medications on the weekend.    3.  Prior to dental work in the future an antibiotic will be prescribed.      Wound Care:  Leave waterproof dressing in place.  As long as the Aquacel dressing is dry, intact, and occlusive at the borders, you may shower as the dressing is waterproof.    - May remove the dressing at 7 days post-op.  If you are discharged to a rehab facility, the nursing staff may remove your Aquacel dressing at 7 days post-op.    - You may reinforce with other dressing materials as needed (gauze, ACE wraps, etc.) if drainage is noted, then please contact us.    You may shower normally, allowing water to run over the incision site when the dressing is removed.    - DO NOT RUB OR SCRUB INCISION. NO SOAKING IN A TUB OR STANDING WATER UNTIL INCISION IS WELL-HEALED AND SCABS HAVE DISAPPEARED.    -Do not apply any lotions, creams, ointments, peroxide, betadine or gels to incision and surrounding area.          -Apply ice to affected area as tolerated.      Driving:  Must be off of narcotic pain medication and have good leg control!  Approximately right le-6 weeks and left le weeks.  Please discuss with Dr. Gallagher at first post-op visit prior to resuming.     Follow-up Appointments:   Please call your surgeon's office if you are unsure about your post-op appointments. Your first post-operative appointment is made prior to surgery for ~ 3 weeks post-op.    Prior, during, and after your surgery and hospital stay, please do not hesitate to call our office with any  questions or concerns.    Our staff will be happy to assist you in any possible way during your personal journey through joint replacement.

## 2025-01-17 NOTE — DISCHARGE INSTR - OTHER ORDERS
If you have any questions or concerns about your discharge instructions, please call the unit at  and ask to speak with the charge nurse. Thank you for choosing [facility]. It was our pleasure to care for you.

## 2025-01-17 NOTE — PROGRESS NOTES
Physical Therapy    Physical Therapy Evaluation    Patient Name: Keiry Kline  MRN: [id number]  Today's Date: 1/17/2025   Time Calculation  Start Time: 1352  Stop Time: 1412  Time Calculation (min): 20 min  4119/4119-A    Assessment/Plan   PT Assessment  PT Assessment Results: Decreased strength, Decreased range of motion, Decreased endurance, Impaired balance, Decreased mobility, Decreased safety awareness, Pain  Rehab Prognosis: Good  Medical Staff Made Aware: Yes  Strengths: Access to adaptive/assistive products, Capable of completing ADLs semi/independent, Support and attitude of living partners  End of Session Communication: Bedside nurse  Assessment Comment: Pt is a 43 y/o female s/p L TKA with Dr. Gallagher 1/17. Pt presents with decreased strength, ROM, endurance and balance. Pt able to tolerate bed mobility, transfers and taking steps from bed to chair, mobility limited this date 2/2 residual numbness for anesthesia. She is functioning below baseline level of function and will benefit from skilled therapy during stay to improve overall functional mobility and safety awareness. Upon discharge pt will benefit from low intensity therapy for continued improvement in functional mobility.     End of Session Patient Position: Up in chair, Alarm on (call light within reach,  and RN present)  IP OR SWING BED PT PLAN  Inpatient or Swing Bed: Inpatient  PT Plan  Treatment/Interventions: Bed mobility, Transfer training, Gait training, Stair training, Balance training, Neuromuscular re-education, Endurance training, Strengthening, Range of motion, Therapeutic exercise, Therapeutic activity, Home exercise program, Positioning, Postural re-education  PT Plan: Ongoing PT  PT Frequency: BID  PT Discharge Recommendations: Low intensity level of continued care  Equipment Recommended upon Discharge: Wheeled walker  PT Recommended Transfer Status: Assist x2, Assistive device (mod A x2)  PT - OK to Discharge: Yes (once  medically cleared to next level care)    Subjective     Current Problem:  1. Unilateral primary osteoarthritis, left knee          Patient Active Problem List   Diagnosis    Dyslipidemia    Effusion of left ankle    Elevated blood pressure reading    Fatigue    Hypertension, essential, benign    Hypomagnesemia    Left wrist tendinitis    Menorrhagia    Myopia of both eyes    Hyperactivity of bladder    Overweight    Posterior tibial tendinitis of left leg    Primary osteoarthritis of both knees    Vitamin B12 deficiency    Vitamin D deficiency    Visit for screening mammogram    Prepatellar bursitis of both knees    Routine general medical examination at a health care facility    Overactive bladder    Need for immunization against influenza    Acute non-recurrent maxillary sinusitis    Unilateral primary osteoarthritis, left knee    Total knee replacement status, left     General Visit Information:  General  Reason for Referral: P/w L knee OA. Pt now s/p L TKA with Dr. Gallagher 1/17.  Referred By: Dr. Gallagher  Past Medical History Relevant to Rehab: HTN, BL knee OA, hyperactive bladder, dyslipidemia  Family/Caregiver Present: Yes  Caregiver Feedback:  present who was helpful and supportive.  Co-Treatment: OT  Co-Treatment Reason: To maximize pt safety with functional mobility and discharge planning  Prior to Session Communication: Bedside nurse  Patient Position Received: Bed, 3 rail up, Alarm on  Preferred Learning Style: verbal  General Comment: Pt pleasant and agreeable to work with therapy. Pt given knee immobilizer as pt unable to complete SLR this date. Mobility this date 2/2 residual numbness/tingling from anesthesia.    Home Living:  Home Living  Type of Home: House  Lives With: Spouse, Dependent children (15 y/o dtr)  Home Adaptive Equipment: Walker rolling or standard  Home Layout: Two level, 1/2 bath on main level, Bed/bath upstairs, Able to live on main level with bedroom/bathroom, Stairs to alternate  level with rails  Alternate Level Stairs-Number of Steps: Flight of steps to second floor bed/bath  Home Access: Stairs to enter without rails  Entrance Stairs-Number of Steps: 2  Bathroom Shower/Tub: Tub/shower unit  Bathroom Equipment: None  Home Living Comments: Pt reports her  works during the day but her sister who lives nearby will be assisting her as needed    Prior Level of Function:  Prior Function Per Pt/Caregiver Report  Level of [place]: Independent with ADLs and functional transfers, Independent with homemaking with ambulation  Receives Help From: Family  ADL Assistance: Independent  Homemaking Assistance: Independent  Ambulatory Assistance: Independent  Vocational: Full time employment  Prior Function Comments: (+) drives    Precautions:  Precautions  LE Weight Bearing Status: Weight Bearing as Tolerated (LLE)  Medical Precautions: Fall precautions  Post-Surgical Precautions: Left total knee precautions  Braces Applied: Knee immobilizer donned on prior to mobilizing pt  Precautions Comment: Pt educated on general knee precautions throughout session    Objective     Pain:  Pain Assessment  Pain Assessment: 0-10  0-10 (Numeric) Pain Score: 0 - No pain    Cognition:  Cognition  Overall Cognitive Status: Within Functional Limits  Orientation Level: Oriented X4    General Assessments:  General Observation  General Observation: bandages wrapped around LLE     Activity Tolerance  Endurance: Tolerates 10 - 20 min exercise with multiple rests    Sensation  Sensation Comment: Pt reports n/t in BL LEs 2/2 anesthesia     Static Sitting Balance  Static Sitting-Balance Support: Bilateral upper extremity supported, Feet supported  Static Sitting-Level of Assistance: Close supervision  Static Standing Balance  Static Standing-Balance Support: Bilateral upper extremity supported  Static Standing-Level of Assistance: Moderate assistance (x2)  Dynamic Standing Balance  Dynamic Standing-Balance Support:  Bilateral upper extremity supported  Dynamic Standing-Level of Assistance: Moderate assistance (x2)    Functional Assessments:     Bed Mobility  Bed Mobility: Yes  Bed Mobility 1  Bed Mobility 1: Supine to sitting, Scooting  Level of Assistance 1: Minimum assistance, Minimal verbal cues  Bed Mobility Comments 1: HOB elevated, required assist with moving LLE OOB, verbal/tactile cues for proper hand placement and body mechanics    Transfers  Transfer: Yes  Transfer 1  Transfer From 1: Bed to  Transfer to 1: Stand  Technique 1: Sit to stand  Transfer Device 1: Walker, Gait belt  Transfer Level of Assistance 1: Moderate assistance, Moderate verbal cues, +2  Trials/Comments 1: Bed elevated, VCs for proper hand placement and body mechanics.  Transfers 2  Transfer From 2: Stand to  Transfer to 2: Chair with arms  Technique 2: Stand to sit  Transfer Device 2: Walker, Gait belt  Transfer Level of Assistance 2: Moderate assistance, Moderate verbal cues, +2  Trials/Comments 2: VCs for proper body mechanics and hand placement.    Ambulation/Gait Training  Ambulation/Gait Training Performed: Yes  Ambulation/Gait Training 1  Surface 1: Level tile  Device 1: Rolling walker  Gait Support Devices: Gait belt  Assistance 1: Moderate assistance, Moderate verbal cues (x2)  Quality of Gait 1: Wide base of support, Diminished heel strike, Inconsistent stride length, Decreased step length, Shuffling gait, Forward flexed posture, Antalgic (decreased eusebia, decreased stance time on LLE, insufficient foot clearance, difficulty weight shifting)  Comments/Distance (ft) 1: ~2 ft from bed > chair; VCs for proper sequencing, weight shifting and safety awareness with FWW    Stairs  Stairs: No (Pt will benefit from stair training for safe home going)     Extremity/Trunk Assessments:  RUE   RUE : Within Functional Limits  LUE   LUE: Within Functional Limits  RLE   RLE : Within Functional Limits  LLE   LLE :  (Knee flex/ext AROM limited 2/2 recent  surgery. Strength >/= 3/5 strength based on functional mobility)    Outcome Measures:     [facility] Basic Mobility  Turning from your back to your side while in a flat bed without using bedrails: A little  Moving from lying on your back to sitting on the side of a flat bed without using bedrails: A little  Moving to and from bed to chair (including a wheelchair): A lot  Standing up from a chair using your arms (e.g. wheelchair or bedside chair): A lot  To walk in hospital room: Total  Climbing 3-5 steps with railing: Total  Basic Mobility - Total Score: 12     Goals:  Encounter Problems       Encounter Problems (Active)       Mobility       Pt will be able to ambulate >/= 50 ft with FWW CGA with good safety awareness.        Start:  01/17/25    Expected End:  01/31/25            Pt will complete supine, seated and standing exercises to maintain/improve overall strength with minimal verbal cues.         Start:  01/17/25    Expected End:  01/31/25            Pt will be able to negotiate ascending/descending flight of steps with use of unilateral HR CGA with sufficient foot clearance and good safety awareness for safe home going.         Start:  01/17/25    Expected End:  01/31/25               PT Transfers       Pt will be able to complete all bed mobility tasks mod I.        Start:  01/17/25    Expected End:  01/31/25            Pt will be able to complete all transfers with FWW CGA demonstrating good safety awareness and proper body mechanics.        Start:  01/17/25    Expected End:  01/31/25                 Education Documentation  Handouts, taught by Beverly Tineo PT at 1/17/2025  2:34 PM.  Learner: Family, Patient  Readiness: Acceptance  Method: Explanation  Response: Verbalizes Understanding, Demonstrated Understanding, Needs Reinforcement    Precautions, taught by Beverly Tineo, PT at 1/17/2025  2:34 PM.  Learner: Family, Patient  Readiness: Acceptance  Method: Explanation  Response: Verbalizes  Understanding, Demonstrated Understanding, Needs Reinforcement    Body Mechanics, taught by Beverly Tineo PT at 1/17/2025  2:34 PM.  Learner: Family, Patient  Readiness: Acceptance  Method: Explanation  Response: Verbalizes Understanding, Demonstrated Understanding, Needs Reinforcement    Home Exercise Program, taught by Beverly Tineo PT at 1/17/2025  2:34 PM.  Learner: Family, Patient  Readiness: Acceptance  Method: Explanation  Response: Verbalizes Understanding, Demonstrated Understanding, Needs Reinforcement    Mobility Training, taught by Beverly Tineo PT at 1/17/2025  2:34 PM.  Learner: Family, Patient  Readiness: Acceptance  Method: Explanation  Response: Verbalizes Understanding, Demonstrated Understanding, Needs Reinforcement    Education Comments  Pt provided with HEP handout and educated on various LE exercises to maintain/improve overall LE strength/ROM with emphasis on LLE. Pt also educated to continue to use knee immobilizer with mobility until she is able to complete a SLR.

## 2025-01-17 NOTE — NURSING NOTE
1350 - pt admitted to Obs units on Ortho. She voiced no current pain. Pt  is at bedside. Reviewed plan of care and no questions at this time. Pt able to voice needs and has call light in reach. Therapy requesting to see pt -     1840 - Pt stable this shift. She has been up to the bathroom a few times and used call light approprietly. Pt spouse went home. She ordered dinner and is still up in the chair. Pt has call light in reach.

## 2025-01-17 NOTE — ANESTHESIA PROCEDURE NOTES
Spinal Block    Patient location during procedure: OR  Start time: 1/17/2025 9:21 AM  End time: 1/17/2025 9:26 AM  Reason for block: primary anesthetic  Staffing  Performed: NANO   Authorized by: Garfield Phipps DO    Performed by: William Duffy    Preanesthetic Checklist  Completed: patient identified, IV checked, risks and benefits discussed, surgical consent, pre-op evaluation, timeout performed and sterile techniques followed  Block Timeout  RN/Licensed healthcare professional reads aloud to the Anesthesia provider and entire team: Patient identity, procedure with side and site, patient position, and as applicable the availability of implants/special equipment/special requirements.    Timeout performed at: 1/17/2025 9:22 AM  Spinal Block  Patient position: sitting  Prep: ChloraPrep  Sterility prep: cap, drape, gloves, hand hygiene and mask  Sedation level: moderate sedation  Patient monitoring: blood pressure and continuous pulse oximetry  Approach: midline  Vertebral space: L4-5  Injection technique: single-shot  Needle  Needle type: pencil-point   Needle gauge: 25 G  Needle length: 3.5 in  Free flowing CSF: yes    Assessment  Sensory level: T10  Block outcome: patient comfortable  Procedure assessment: patient tolerated procedure well with no immediate complications

## 2025-01-17 NOTE — ANESTHESIA POSTPROCEDURE EVALUATION
Patient: Keiry Kline    Procedure Summary       Date: 01/17/25 Room / Location: [facility] OR 04 / Virtual [facility] OR    Anesthesia Start: 0919 Anesthesia Stop: 1110    Procedure: LEFT KNEE TOTAL ARTHROPLASTY NICKEL FREE (Left: Knee) Diagnosis:       Unilateral primary osteoarthritis, left knee      (Unilateral primary osteoarthritis, left knee [M17.12])    Surgeons: Carlitos Gallagher MD Responsible Provider: Garfield Phipps DO    Anesthesia Type: spinal ASA Status: 3            Anesthesia Type: spinal    Vitals Value Taken Time   /66 01/17/25 1107   Temp 36.0 01/17/25 1111   Pulse 82 01/17/25 1111   Resp 13 01/17/25 1111   SpO2 97 % 01/17/25 1108   Vitals shown include unfiled device data.    Anesthesia Post Evaluation    Patient location during evaluation: PACU  Patient participation: complete - patient participated  Level of consciousness: awake and alert  Pain score: 0  Pain management: adequate  Multimodal analgesia pain management approach  Airway patency: patent  Cardiovascular status: acceptable and hemodynamically stable  Respiratory status: acceptable, unassisted, spontaneous ventilation, nonlabored ventilation and room air  Hydration status: acceptable  Postoperative Nausea and Vomiting: none        There were no known notable events for this encounter.

## 2025-01-17 NOTE — ANESTHESIA PREPROCEDURE EVALUATION
Patient: Keiry Kline    Procedure Information       Date/Time: 01/17/25 0945    Procedure: LEFT KNEE TOTAL ARTHROPLASTY NICKEL FREE (Left: Knee)    Location: [facility] OR 04 / [facility] OR    Surgeons: Carlitos Gallagher MD            Relevant Problems   Cardiac   (+) Hypertension, essential, benign      Musculoskeletal   (+) Primary osteoarthritis of both knees   (+) Unilateral primary osteoarthritis, left knee      HEENT   (+) Acute non-recurrent maxillary sinusitis      GYN   (+) Menorrhagia       Clinical information reviewed:   Tobacco  Allergies  Meds   Med Hx  Surg Hx  OB Status  Fam Hx  Soc   Hx        NPO Detail:  NPO/Void Status  Carbohydrate Drink Given Prior to Surgery? : N  Date of Last Liquid: 01/16/25  Time of Last Liquid: 2200  Date of Last Solid: 01/16/25  Time of Last Solid: 2200  Last Intake Type: Food  Time of Last Void: 0805         Physical Exam    Airway  Mallampati: II  TM distance: >3 FB  Neck ROM: full     Cardiovascular - normal exam     Dental    Pulmonary - normal exam     Abdominal - normal exam  (+) obese             Anesthesia Plan    History of general anesthesia?: yes  History of complications of general anesthesia?: no    ASA 3     regional, MAC and spinal     intravenous induction   Anesthetic plan and risks discussed with patient.    Plan discussed with CRNA.

## 2025-01-17 NOTE — CARE PLAN
The patient's goals for the shift include      The clinical goals for the shift include Pt will be evaluated by therapy by end of shift 1/17/25    Pt met goals

## 2025-01-17 NOTE — PROGRESS NOTES
Occupational Therapy    Occupational Therapy    Evaluation    Patient Name: Keiry Kline  MRN: [id number]  Today's Date: 1/17/2025  Time Calculation  Start Time: 1353  Stop Time: 1411  Time Calculation (min): 18 min  4119/4119-A    Assessment  IP OT Assessment  OT Assessment:  (Pt. presents with decreased balance and endurance impacting pt. abiity to complete ADLs and mobility independently)  Prognosis: Good  Barriers to Discharge Home: No anticipated barriers  Evaluation/Treatment Tolerance: Patient tolerated treatment well  End of Session Communication: Bedside nurse  End of Session Patient Position: Up in chair, Alarm off, not on at start of session (RN aware)    Plan:  Treatment Interventions: ADL retraining, Functional transfer training  OT Frequency: Daily  OT Discharge Recommendations: Low intensity level of continued care  Equipment Recommended upon Discharge: Wheeled walker  OT Recommended Transfer Status: Assist of 2  OT - OK to Discharge: Yes (Next level of care when cleared by medical team)    Subjective   S/p left TKR on 1/17/25  Current Problem:  1. Unilateral primary osteoarthritis, left knee            General:  General  Reason for Referral: ADLs, discharge planning  Referred By: Dr. Gallagher  Family/Caregiver Present:  (Spouse present)  Co-Treatment: PT  Co-Treatment Reason: Safety  Prior to Session Communication: Bedside nurse  Patient Position Received: Bed, 3 rail up, Alarm on    Precautions:  LE Weight Bearing Status: Weight Bearing as Tolerated (left LE)  Medical Precautions: Fall precautions  Post-Surgical Precautions: Left total knee precautions  Braces Applied: Knee immbolizer donned to left LE        Pain:  Pain Assessment  Pain Assessment: 0-10  0-10 (Numeric) Pain Score: 0 - No pain    Objective     Cognition:  Overall Cognitive Status: Within Functional Limits  Orientation Level: Oriented X4             Home Living:  Home Living Comments:  (Pt. lives with spouse and 16 yr old daughter in  home with 2 entry steps with bedroom and bathroom on second floor with tub shower. Has half bath on first floor. PLOF independent, works FT, owns ww. Sister lives nearby and can assist PRN)     Prior Function:  Level of [place]: Independent with ADLs and functional transfers  Receives Help From: Family  ADL Assistance: Independent  Homemaking Assistance: Independent  Ambulatory Assistance: Independent      ADL:  Grooming Assistance: Stand by  LE Dressing Assistance: Maximal  LE Dressing Deficit: Don/doff L sock, Don/doff R sock    Activity Tolerance:  Endurance: Tolerates 10 - 20 min exercise with multiple rests    Bed Mobility/Transfers:   Bed Mobility  Bed Mobility:  (supine to sit min assist)  Transfers  Transfer:  (sit<>stand mod assist x 2 with cues for safe hand placement; bed to chair with ww and gait belt mod assist x 2)        Sitting Balance:  Static Sitting Balance  Static Sitting-Balance Support: No upper extremity supported  Static Sitting-Level of Assistance: Distant supervision    Standing Balance:  Static Standing Balance  Static Standing-Balance Support: Bilateral upper extremity supported  Static Standing-Level of Assistance: Moderate assistance      Sensation:  Sensation Comment:  (Numbness to left LE)      Hand Function:  Hand Function  Gross Grasp: Functional  Coordination: Functional    Extremities: RUE   RUE : Within Functional Limits and LUE   LUE: Within Functional Limits    Outcome Measures: [facility] Daily Activity  Putting on and taking off regular lower body clothing: A lot  Bathing (including washing, rinsing, drying): A lot  Putting on and taking off regular upper body clothing: A little  Toileting, which includes using toilet, bedpan or urinal: A little  Taking care of personal grooming such as brushing teeth: None  Eating Meals: None  Daily Activity - Total Score: 18                       EDUCATION:  Education  Individual(s) Educated: Patient  Education Provided: Fall precautons,  Risk and benefits of OT discussed with patient or other, POC discussed and agreed upon  Patient Response to Education: Patient/Caregiver Verbalized Understanding of Information      Goals:   Encounter Problems       Encounter Problems (Active)       Dressings Lower Extremities       STG - Patient to complete lower body dressing STEPHAN        Start:  01/17/25    Expected End:  01/31/25               Functional Balance       LTG - Patient will maintain standing and sitting balance to allow for completion of daily activities       Start:  01/17/25    Expected End:  01/31/25               Grooming       STG - Patient completes grooming MOD I       Start:  01/17/25    Expected End:  01/31/25               OT Transfers       STG - Patient will perform bed mobility MOD I       Start:  01/17/25    Expected End:  01/31/25            STG - Patient will perform toilet transfer MOD I       Start:  01/17/25    Expected End:  01/31/25

## 2025-01-18 ENCOUNTER — DOCUMENTATION (OUTPATIENT)
Dept: HOME HEALTH SERVICES | Facility: HOME HEALTH | Age: 45
End: 2025-01-18
Payer: COMMERCIAL

## 2025-01-18 ENCOUNTER — HOME HEALTH ADMISSION (OUTPATIENT)
Dept: HOME HEALTH SERVICES | Facility: HOME HEALTH | Age: 45
End: 2025-01-18
Payer: COMMERCIAL

## 2025-01-18 VITALS
OXYGEN SATURATION: 98 % | DIASTOLIC BLOOD PRESSURE: 90 MMHG | SYSTOLIC BLOOD PRESSURE: 154 MMHG | RESPIRATION RATE: 18 BRPM | HEART RATE: 74 BPM | HEIGHT: 62 IN | WEIGHT: 205 LBS | BODY MASS INDEX: 37.73 KG/M2 | TEMPERATURE: 98.6 F

## 2025-01-18 LAB
ANION GAP SERPL CALC-SCNC: 11 MMOL/L (ref 10–20)
BUN SERPL-MCNC: 13 MG/DL (ref 6–23)
CALCIUM SERPL-MCNC: 8.4 MG/DL (ref 8.6–10.3)
CHLORIDE SERPL-SCNC: 106 MMOL/L (ref 98–107)
CO2 SERPL-SCNC: 25 MMOL/L (ref 21–32)
CREAT SERPL-MCNC: 0.65 MG/DL (ref 0.5–1.05)
EGFRCR SERPLBLD CKD-EPI 2021: >90 ML/MIN/1.73M*2
ERYTHROCYTE [DISTWIDTH] IN BLOOD BY AUTOMATED COUNT: 13.8 % (ref 11.5–14.5)
GLUCOSE SERPL-MCNC: 104 MG/DL (ref 74–99)
HCT VFR BLD AUTO: 32.2 % (ref 36–46)
HGB BLD-MCNC: 10.7 G/DL (ref 12–16)
MCH RBC QN AUTO: 30.7 PG (ref 26–34)
MCHC RBC AUTO-ENTMCNC: 33.2 G/DL (ref 32–36)
MCV RBC AUTO: 93 FL (ref 80–100)
NRBC BLD-RTO: 0 /100 WBCS (ref 0–0)
PLATELET # BLD AUTO: 262 X10*3/UL (ref 150–450)
POTASSIUM SERPL-SCNC: 3.5 MMOL/L (ref 3.5–5.3)
RBC # BLD AUTO: 3.48 X10*6/UL (ref 4–5.2)
SODIUM SERPL-SCNC: 138 MMOL/L (ref 136–145)
WBC # BLD AUTO: 10.5 X10*3/UL (ref 4.4–11.3)

## 2025-01-18 PROCEDURE — 36415 COLL VENOUS BLD VENIPUNCTURE: CPT | Performed by: ORTHOPAEDIC SURGERY

## 2025-01-18 PROCEDURE — 97116 GAIT TRAINING THERAPY: CPT | Mod: GP

## 2025-01-18 PROCEDURE — 96372 THER/PROPH/DIAG INJ SC/IM: CPT | Performed by: ORTHOPAEDIC SURGERY

## 2025-01-18 PROCEDURE — 99024 POSTOP FOLLOW-UP VISIT: CPT | Performed by: NURSE PRACTITIONER

## 2025-01-18 PROCEDURE — 7100000011 HC EXTENDED STAY RECOVERY HOURLY - NURSING UNIT

## 2025-01-18 PROCEDURE — 2500000001 HC RX 250 WO HCPCS SELF ADMINISTERED DRUGS (ALT 637 FOR MEDICARE OP): Performed by: ORTHOPAEDIC SURGERY

## 2025-01-18 PROCEDURE — 2500000004 HC RX 250 GENERAL PHARMACY W/ HCPCS (ALT 636 FOR OP/ED): Performed by: ORTHOPAEDIC SURGERY

## 2025-01-18 PROCEDURE — 80048 BASIC METABOLIC PNL TOTAL CA: CPT | Performed by: ORTHOPAEDIC SURGERY

## 2025-01-18 PROCEDURE — 2500000001 HC RX 250 WO HCPCS SELF ADMINISTERED DRUGS (ALT 637 FOR MEDICARE OP): Performed by: PHYSICIAN ASSISTANT

## 2025-01-18 PROCEDURE — 97535 SELF CARE MNGMENT TRAINING: CPT | Mod: GO,CO

## 2025-01-18 PROCEDURE — 2500000002 HC RX 250 W HCPCS SELF ADMINISTERED DRUGS (ALT 637 FOR MEDICARE OP, ALT 636 FOR OP/ED): Performed by: ORTHOPAEDIC SURGERY

## 2025-01-18 PROCEDURE — 85027 COMPLETE CBC AUTOMATED: CPT | Performed by: ORTHOPAEDIC SURGERY

## 2025-01-18 RX ORDER — ENOXAPARIN SODIUM 100 MG/ML
40 INJECTION SUBCUTANEOUS DAILY
Qty: 30 EACH | Refills: 0 | Status: SHIPPED | OUTPATIENT
Start: 2025-01-19 | End: 2025-01-18 | Stop reason: HOSPADM

## 2025-01-18 RX ORDER — DOCUSATE SODIUM 100 MG/1
100 CAPSULE, LIQUID FILLED ORAL 2 TIMES DAILY
Qty: 60 CAPSULE | Refills: 0 | Status: SHIPPED | OUTPATIENT
Start: 2025-01-18 | End: 2025-02-17

## 2025-01-18 RX ORDER — CYCLOBENZAPRINE HCL 10 MG
10 TABLET ORAL 3 TIMES DAILY PRN
Qty: 21 TABLET | Refills: 0 | Status: SHIPPED | OUTPATIENT
Start: 2025-01-18 | End: 2025-01-25

## 2025-01-18 RX ORDER — ACETAMINOPHEN 325 MG/1
650 TABLET ORAL EVERY 6 HOURS PRN
Qty: 240 TABLET | Refills: 0 | Status: SHIPPED | OUTPATIENT
Start: 2025-01-18 | End: 2025-02-17

## 2025-01-18 RX ORDER — OXYCODONE HYDROCHLORIDE 5 MG/1
5 TABLET ORAL EVERY 6 HOURS PRN
Qty: 15 TABLET | Refills: 0 | Status: SHIPPED | OUTPATIENT
Start: 2025-01-18 | End: 2025-01-22 | Stop reason: SDUPTHER

## 2025-01-18 RX ADMIN — Medication 20 ML: at 12:00

## 2025-01-18 RX ADMIN — Medication 20 ML: at 06:33

## 2025-01-18 RX ADMIN — ACETAMINOPHEN 650 MG: 325 TABLET ORAL at 05:32

## 2025-01-18 RX ADMIN — Medication 20 ML: at 09:46

## 2025-01-18 RX ADMIN — OXYCODONE HYDROCHLORIDE 10 MG: 10 TABLET ORAL at 09:42

## 2025-01-18 RX ADMIN — Medication 20 ML: at 14:12

## 2025-01-18 RX ADMIN — Medication 20 ML: at 11:00

## 2025-01-18 RX ADMIN — KETOROLAC TROMETHAMINE 30 MG: 30 INJECTION, SOLUTION INTRAMUSCULAR at 01:53

## 2025-01-18 RX ADMIN — ATORVASTATIN CALCIUM 10 MG: 10 TABLET, FILM COATED ORAL at 09:42

## 2025-01-18 RX ADMIN — Medication 20 ML: at 05:34

## 2025-01-18 RX ADMIN — Medication 20 ML: at 14:08

## 2025-01-18 RX ADMIN — Medication 20 ML: at 09:36

## 2025-01-18 RX ADMIN — DOCUSATE SODIUM 100 MG: 100 CAPSULE, LIQUID FILLED ORAL at 09:42

## 2025-01-18 RX ADMIN — ENOXAPARIN SODIUM 40 MG: 40 INJECTION SUBCUTANEOUS at 09:42

## 2025-01-18 RX ADMIN — KETOROLAC TROMETHAMINE 30 MG: 30 INJECTION, SOLUTION INTRAMUSCULAR at 09:42

## 2025-01-18 RX ADMIN — CEFAZOLIN SODIUM 2 G: 2 INJECTION, SOLUTION INTRAVENOUS at 01:53

## 2025-01-18 RX ADMIN — Medication 20 ML: at 13:04

## 2025-01-18 RX ADMIN — TRANEXAMIC ACID 1950 MG: 650 TABLET ORAL at 05:32

## 2025-01-18 RX ADMIN — Medication 20 ML: at 08:00

## 2025-01-18 RX ADMIN — ACETAMINOPHEN 650 MG: 325 TABLET ORAL at 13:02

## 2025-01-18 RX ADMIN — OXYCODONE HYDROCHLORIDE 10 MG: 10 TABLET ORAL at 14:07

## 2025-01-18 ASSESSMENT — PAIN - FUNCTIONAL ASSESSMENT
PAIN_FUNCTIONAL_ASSESSMENT: 0-10

## 2025-01-18 ASSESSMENT — COGNITIVE AND FUNCTIONAL STATUS - GENERAL
MOBILITY SCORE: 20
CLIMB 3 TO 5 STEPS WITH RAILING: A LITTLE
WALKING IN HOSPITAL ROOM: A LITTLE
MOVING TO AND FROM BED TO CHAIR: A LITTLE
DAILY ACTIVITIY SCORE: 22
HELP NEEDED FOR BATHING: A LITTLE
DRESSING REGULAR LOWER BODY CLOTHING: A LITTLE
STANDING UP FROM CHAIR USING ARMS: A LITTLE

## 2025-01-18 ASSESSMENT — PAIN SCALES - GENERAL
PAINLEVEL_OUTOF10: 8
PAINLEVEL_OUTOF10: 7
PAINLEVEL_OUTOF10: 6
PAINLEVEL_OUTOF10: 4
PAINLEVEL_OUTOF10: 8
PAINLEVEL_OUTOF10: 4
PAINLEVEL_OUTOF10: 4
PAINLEVEL_OUTOF10: 3

## 2025-01-18 ASSESSMENT — ACTIVITIES OF DAILY LIVING (ADL): HOME_MANAGEMENT_TIME_ENTRY: 41

## 2025-01-18 NOTE — CARE PLAN
Problem: Pain - Adult  Goal: Verbalizes/displays adequate comfort level or baseline comfort level  Outcome: Progressing     Problem: Safety - Adult  Goal: Free from fall injury  Outcome: Progressing     Problem: Discharge Planning  Goal: Discharge to home or other facility with appropriate resources  Outcome: Progressing

## 2025-01-18 NOTE — PROGRESS NOTES
Physical Therapy    Physical Therapy Treatment    Patient Name: Keiry Kline  MRN: [id number]  Today's Date: 1/18/2025  Time Calculation  Start Time: 1109  Stop Time: 1131  Time Calculation (min): 22 min     4119/4119-A    Assessment/Plan   PT Assessment  PT Assessment Results: Decreased strength, Decreased endurance, Decreased range of motion, Decreased mobility, Impaired balance, Decreased safety awareness, Pain, Orthopedic restrictions  Rehab Prognosis: Good  Medical Staff Made Aware: Yes  Strengths: Access to adaptive/assistive products, Capable of completing ADLs semi/independent, Support and attitude of living partners  End of Session Communication: Bedside nurse  Assessment Comment: Pt progressed gait distance, stair navigation, and LLE TKA HEP with difficulty 2/2 L Knee pain and swelling. Pt would benefit from continued acute care PT during hospital LOS and upon dc at a low level intensity. recommend FWW.  End of Session Patient Position: Up in chair, Alarm on  PT Plan  Inpatient/Swing Bed or Outpatient: Inpatient  PT Plan  Treatment/Interventions: Bed mobility, Transfer training, Gait training, Balance training, Stair training, Neuromuscular re-education, Strengthening, Endurance training, Range of motion, Therapeutic exercise, Therapeutic activity, Home exercise program, Positioning, Postural re-education  PT Plan: Ongoing PT  PT Frequency: BID  PT Discharge Recommendations: Low intensity level of continued care  Equipment Recommended upon Discharge: Wheeled walker  PT Recommended Transfer Status: Contact guard, Assistive device  PT - OK to Discharge: Yes (Pt ok to dc from acute care PT to next level of care once cleared by medical team.)    Current Problem:  Patient Active Problem List   Diagnosis    Dyslipidemia    Effusion of left ankle    Elevated blood pressure reading    Fatigue    Hypertension, essential, benign    Hypomagnesemia    Left wrist tendinitis    Menorrhagia    Myopia of both eyes     Hyperactivity of bladder    Overweight    Posterior tibial tendinitis of left leg    Primary osteoarthritis of both knees    Vitamin B12 deficiency    Vitamin D deficiency    Visit for screening mammogram    Prepatellar bursitis of both knees    Routine general medical examination at a health care facility    Overactive bladder    Need for immunization against influenza    Acute non-recurrent maxillary sinusitis    Unilateral primary osteoarthritis, left knee    Total knee replacement status, left       General Visit Information:   PT  Visit  PT Received On: 01/18/25  General  Reason for Referral: S/p L TKA with Dr. Gallagher 1/17  Referred By: Elliott  Family/Caregiver Present: Yes  Caregiver Feedback: Sister present for family training/stair training.  Prior to Session Communication: Bedside nurse  Patient Position Received: Up in chair, Alarm on  General Comment: Pt just finished with OT and is agreeable to PT treatment.  Subjective     Precautions:  Precautions  LE Weight Bearing Status: Weight Bearing as Tolerated (LLE WBAT)  Medical Precautions: Fall precautions  Post-Surgical Precautions: Left total knee precautions  Braces Applied: Knee immobilizer donned on prior to mobilizing pt  Precautions Comment: KI present upon arrival. Pt able activate quad. Pt has difficulty with SLR. In standing and during gait, no knee buckling observed. Doffed KI this visit.      Objective     Pain:  Pain Assessment  Pain Assessment: 0-10  0-10 (Numeric) Pain Score: 4  Pain Type: Surgical pain  Pain Location: Knee  Pain Interventions: Ambulation/increased activity, Repositioned, Cold pack (Cold pack applied at end of visit.)    Cognition:  Cognition  Orientation Level: Oriented X4    Treatments:  Therapeutic Exercise  Therapeutic Exercise Performed: Yes  Therapeutic Exercise Activity 1: Reviewed HEP program. Pt performed quad sets x10 reps for 3-5 sec holds, seated heel slides x10 reps AROM and AAROM SLR for LLE x5 reps. Pt encouraged  to perform exercises 2-3x/daily. Pt agreeable.    Ambulation/Gait Training  Ambulation/Gait Training Performed: Yes  Ambulation/Gait Training 1  Surface 1: Level tile  Device 1: Rolling walker  Gait Support Devices: Gait belt  Assistance 1: Contact guard  Comments/Distance (ft) 1: Pt amb 2x70' with FWW, CGAx1 with step-to antalgic gait pattern, decreased gait speed, decreased step length, heavy UE support on walker frame. No pathway deviation or overt LOB episodes.  Transfers  Transfer: Yes  Transfer 1  Technique 1: Sit to stand  Transfer Device 1: Walker, Gait belt  Trials/Comments 1: x2 trials STS from chair with arm rests, SBAx1 with FWW. Cues for LLE management and hand placement.  Stairs  Stairs: Yes  Stairs  Comment/Number of Steps 1: Pt ascended/descended 3 steps with 1 HR and L HHA, CGAx1 with step-to pattern, verbal cues for up with the good/down with the bad. Pt's sister present for stair training and agreeable to practice with pt. Pt will not have railings. Pt and sister practiced 3 steps with L HHA, MinAx1 using up with the good/down with the bad method. Pt and sister demonstrate good understanding of guarding technique, use of gait belt and step-to pattern.       Outcome Measures:     [facility] Basic Mobility  Turning from your back to your side while in a flat bed without using bedrails: None  Moving from lying on your back to sitting on the side of a flat bed without using bedrails: None  Moving to and from bed to chair (including a wheelchair): A little  Standing up from a chair using your arms (e.g. wheelchair or bedside chair): A little  To walk in hospital room: A little  Climbing 3-5 steps with railing: A little  Basic Mobility - Total Score: 20    Education Documentation  Mobility Training, taught by Sandra Navarro, PT at 1/18/2025  3:10 PM.  Learner: Patient  Readiness: Acceptance  Method: Explanation  Response: Verbalizes Understanding, Demonstrated Understanding    Education Comments  No  comments found.           EDUCATION:     Encounter Problems       Encounter Problems (Active)       Mobility       Pt will be able to ambulate >/= 50 ft with FWW CGA with good safety awareness.  (Progressing)       Start:  01/17/25    Expected End:  01/31/25            Pt will complete supine, seated and standing exercises to maintain/improve overall strength with minimal verbal cues.   (Progressing)       Start:  01/17/25    Expected End:  01/31/25            Pt will be able to negotiate ascending/descending flight of steps with use of unilateral HR CGA with sufficient foot clearance and good safety awareness for safe home going.   (Progressing)       Start:  01/17/25    Expected End:  01/31/25               PT Transfers       Pt will be able to complete all bed mobility tasks mod I.  (Progressing)       Start:  01/17/25    Expected End:  01/31/25            Pt will be able to complete all transfers with FWW CGA demonstrating good safety awareness and proper body mechanics.  (Progressing)       Start:  01/17/25    Expected End:  01/31/25               Pain - Adult

## 2025-01-18 NOTE — PROGRESS NOTES
01/18/25 1400   Discharge Planning   Home or Post Acute Services In home services   Type of Home Care Services Home PT   Expected Discharge Disposition Home H  ([facility])     Advised [facility] of d/c home today. They confirmed start of care in 24 hours.

## 2025-01-18 NOTE — PROGRESS NOTES
"Keiry Kline is a 44 y.o. female on day 0 of admission presenting with Unilateral primary osteoarthritis, left knee.    Subjective   Ms. Kline was in bed, in NAD. C/o intermittent pain the the left knee. Denied fever, chills, n/v, SOB, chest pain, or palpitations.  No issues with voiding. Progressing well with PT/OT.       Objective     Physical Exam  Constitutional:       Appearance: Normal appearance.   HENT:      Head: Normocephalic and atraumatic.      Nose: Nose normal.      Mouth/Throat:      Mouth: Mucous membranes are moist.   Eyes:      Extraocular Movements: Extraocular movements intact.      Pupils: Pupils are equal, round, and reactive to light.   Cardiovascular:      Rate and Rhythm: Normal rate and regular rhythm.      Pulses: Normal pulses.      Heart sounds: Normal heart sounds.   Pulmonary:      Effort: Pulmonary effort is normal.      Breath sounds: Normal breath sounds.   Abdominal:      General: Bowel sounds are normal.      Palpations: Abdomen is soft.   Musculoskeletal:         General: Normal range of motion.      Cervical back: Normal range of motion and neck supple.      Comments: Left knee dressing is dry and intact.  Light touch sensation is intact, ankle dorsiflexion/plantar flexion is intact. DP 2+/2+ palpable   Skin:     General: Skin is warm and dry.      Capillary Refill: Capillary refill takes less than 2 seconds.   Neurological:      General: No focal deficit present.      Mental Status: She is alert and oriented to person, place, and time.   Psychiatric:         Judgment: Judgment normal.         Last Recorded Vitals  Blood pressure 131/77, pulse 65, temperature 36.8 °C (98.2 °F), temperature source Temporal, resp. rate 18, height 1.575 m (5' 2\"), weight 93 kg (205 lb), SpO2 98%.  Intake/Output last 3 Shifts:  I/O last 3 completed shifts:  In: 1590 (17.1 mL/kg) [P.O.:490; IV Piggyback:1100]  Out: - (0 mL/kg)   Weight: 93 kg     Relevant Results      Scheduled " medications  acetaminophen, 650 mg, oral, q6h NAHID  atorvastatin, 10 mg, oral, Daily  docusate sodium, 100 mg, oral, BID  enoxaparin, 40 mg, subcutaneous, Daily  oral hydration, 20 mL, oral, q1h while awake  valsartan, 80 mg, oral, Daily      Continuous medications  oxygen, 2 L/min, Last Rate: Stopped (01/17/25 1353)      PRN medications  PRN medications: benzocaine-menthol, bisacodyl, cyclobenzaprine, HYDROmorphone, morphine, naloxone, ondansetron ODT **OR** ondansetron, oxyCODONE, oxyCODONE  Results for orders placed or performed during the hospital encounter of 01/17/25 (from the past 24 hours)   Basic metabolic panel   Result Value Ref Range    Glucose 104 (H) 74 - 99 mg/dL    Sodium 138 136 - 145 mmol/L    Potassium 3.5 3.5 - 5.3 mmol/L    Chloride 106 98 - 107 mmol/L    Bicarbonate 25 21 - 32 mmol/L    Anion Gap 11 10 - 20 mmol/L    Urea Nitrogen 13 6 - 23 mg/dL    Creatinine 0.65 0.50 - 1.05 mg/dL    eGFR >90 >60 mL/min/1.73m*2    Calcium 8.4 (L) 8.6 - 10.3 mg/dL   CBC   Result Value Ref Range    WBC 10.5 4.4 - 11.3 x10*3/uL    nRBC 0.0 0.0 - 0.0 /100 WBCs    RBC 3.48 (L) 4.00 - 5.20 x10*6/uL    Hemoglobin 10.7 (L) 12.0 - 16.0 g/dL    Hematocrit 32.2 (L) 36.0 - 46.0 %    MCV 93 80 - 100 fL    MCH 30.7 26.0 - 34.0 pg    MCHC 33.2 32.0 - 36.0 g/dL    RDW 13.8 11.5 - 14.5 %    Platelets 262 150 - 450 x10*3/uL          Results for orders placed or performed during the hospital encounter of 01/17/25 (from the past 24 hours)   Basic metabolic panel   Result Value Ref Range    Glucose 104 (H) 74 - 99 mg/dL    Sodium 138 136 - 145 mmol/L    Potassium 3.5 3.5 - 5.3 mmol/L    Chloride 106 98 - 107 mmol/L    Bicarbonate 25 21 - 32 mmol/L    Anion Gap 11 10 - 20 mmol/L    Urea Nitrogen 13 6 - 23 mg/dL    Creatinine 0.65 0.50 - 1.05 mg/dL    eGFR >90 >60 mL/min/1.73m*2    Calcium 8.4 (L) 8.6 - 10.3 mg/dL   CBC   Result Value Ref Range    WBC 10.5 4.4 - 11.3 x10*3/uL    nRBC 0.0 0.0 - 0.0 /100 WBCs    RBC 3.48 (L) 4.00 -  5.20 x10*6/uL    Hemoglobin 10.7 (L) 12.0 - 16.0 g/dL    Hematocrit 32.2 (L) 36.0 - 46.0 %    MCV 93 80 - 100 fL    MCH 30.7 26.0 - 34.0 pg    MCHC 33.2 32.0 - 36.0 g/dL    RDW 13.8 11.5 - 14.5 %    Platelets 262 150 - 450 x10*3/uL    .No results found.                     Assessment/Plan   Assessment & Plan  Unilateral primary osteoarthritis, left knee    Total knee replacement status, left    POD # 1, s/p right TKA  Continue PT/OT, WBAT right  LE  Reviewed am labs  Multimodal pain regimen  Knee dressing to be removed on post op day #7  VTE prophylaxis: as the patient is on oral contraceptives, she will be discharged on Xarelto, Eliquis, or subcutaneous Lovenox x 30 days  Will discharge home when appropriateFollow up with Dr Gallagher in 2 weeks         I spent 25 minutes in the professional and overall care of this patient.      Winter Hi, APRN-CNP

## 2025-01-18 NOTE — HH CARE COORDINATION
Home Care received a Referral for Physical Therapy and Occupational Therapy. We have processed the referral for a Start of Care on 1/19/25.     If you have any questions or concerns, please feel free to contact us at [phone]. Follow the prompts, enter your five digit zip code, and you will be directed to your care team on [facility].

## 2025-01-18 NOTE — NURSING NOTE
Discharge instrucions given. Patient verbalized uderstanding. Discharge medications reviewed and sent to preferred pharmacy. IV removed per this RN. All questions addressed.

## 2025-01-18 NOTE — PROGRESS NOTES
Occupational Therapy    Occupational Therapy    OT Treatment    Patient Name: Keiry Kline  MRN: [id number]  Today's Date: 1/18/2025  Time Calculation  Start Time: 1026  Stop Time: 1107  Time Calculation (min): 41 min       4119/4119-A    Assessment:  End of Session Communication: Bedside nurse  End of Session Patient Position: Up in chair, Alarm off, not on at start of session       Plan:  OT Frequency: Daily  OT Discharge Recommendations: Low intensity level of continued care     Subjective      01/18/25 1026   OT Last Visit   OT Received On 01/18/25   General   Prior to Session Communication Bedside nurse   Patient Position Received Bed, 3 rail up;Alarm off, not on at start of session   Preferred Learning Style verbal;visual   General Comment Pt found supine in bed and agreeable to therapy upon arrival. Pt is pleasant and cooperative throughout tx. Education provided to pt on precautions and walker safety/approximation.   Precautions   LE Weight Bearing Status Weight Bearing as Tolerated   Medical Precautions Fall precautions   Post-Surgical Precautions Left total knee precautions   Braces Applied Knee immbolizer donned to left LE   Pain Assessment   Pain Assessment 0-10   0-10 (Numeric) Pain Score 8   Grooming   Grooming Level of Assistance Modified independent   Grooming Where Assessed Standing sinkside   Grooming Comments Pt demos oral hygeine routine in standing at sink at Mod I with FWW, intermittent use of sink for support, no LOB observed.   UE Dressing   UE Dressing Level of Assistance Independent   UE Dressing Where Assessed Recliner   UE Dressing Comments Doff hosp gown/don bra and shirt ind while seated in recliner.   LE Dressing   LE Dressing Yes   LE Dressing Where Assessed Recliner   LE Dressing Comments Min A to unthread/thread underwear and pants while seated in recliner with min a d/t immobilizer on L LE, Mod I in standing with FWW to advance over hips.   Bed Mobility   Bed Mobility Yes   Bed  Mobility 1   Bed Mobility 1 Supine to sitting   Level of Assistance 1 Minimum assistance   Bed Mobility Comments 1 Supine->sit EOB with HOB slightly elevated and assist to transition L LE to EOB.   Functional Mobility   Functional Mobility Performed Yes  (Pt demos functional mobility in room at Mod I with FWW, no LOB observed.)   Transfers   Transfer Yes   Transfer 1   Transfer Device 1 Walker;Gait belt   Transfer Level of Assistance 1 Modified independent   Trials/Comments 1 Sit<->stand from multiple surfaces at mod I with FWW when in standing.   IP OT Assessment   End of Session Communication Bedside nurse   End of Session Patient Position Up in chair;Alarm off, not on at start of session   Inpatient Plan   OT Frequency Daily   OT Discharge Recommendations Low intensity level of continued care         Outcome Measures:[facility] Daily Activity  Putting on and taking off regular lower body clothing: A little  Bathing (including washing, rinsing, drying): A little  Putting on and taking off regular upper body clothing: None  Toileting, which includes using toilet, bedpan or urinal: None  Taking care of personal grooming such as brushing teeth: None  Eating Meals: None  Daily Activity - Total Score: 22  Education Documentation  Handouts, taught by LEONARD Goss at 1/18/2025 12:51 PM.  Learner: Patient  Readiness: Acceptance  Method: Explanation, Demonstration, Teach-back  Response: Verbalizes Understanding    Precautions, taught by LEONARD Goss at 1/18/2025 12:51 PM.  Learner: Patient  Readiness: Acceptance  Method: Explanation, Demonstration, Teach-back  Response: Verbalizes Understanding    Body Mechanics, taught by LEONARD Goss at 1/18/2025 12:51 PM.  Learner: Patient  Readiness: Acceptance  Method: Explanation, Demonstration, Teach-back  Response: Verbalizes Understanding    Home Exercise Program, taught by LEONARD Goss at 1/18/2025 12:51 PM.  Learner: Patient  Readiness: Acceptance  Method:  Explanation, Demonstration, Teach-back  Response: Verbalizes Understanding    Mobility Training, taught by LEONARD Goss at 1/18/2025 12:51 PM.  Learner: Patient  Readiness: Acceptance  Method: Explanation, Demonstration, Teach-back  Response: Verbalizes Understanding    Education Comments  No comments found.            Goals:  Encounter Problems       Encounter Problems (Active)       Dressings Lower Extremities       STG - Patient to complete lower body dressing STEPHAN  (Progressing)       Start:  01/17/25    Expected End:  01/18/25               Functional Balance       LTG - Patient will maintain standing and sitting balance to allow for completion of daily activities (Progressing)       Start:  01/17/25    Expected End:  01/31/25               Grooming       STG - Patient completes grooming MOD I (Progressing)       Start:  01/17/25    Expected End:  01/18/25               OT Transfers       STG - Patient will perform bed mobility MOD I (Progressing)       Start:  01/17/25    Expected End:  01/31/25            STG - Patient will perform toilet transfer MOD I (Progressing)       Start:  01/17/25    Expected End:  01/31/25

## 2025-01-18 NOTE — DISCHARGE SUMMARY
Discharge Diagnosis  Unilateral primary osteoarthritis, left knee        Test Results Pending At Discharge  Pending Labs       No current pending labs.            Hospital Course      Discharge summary      This patient Keiry Kline was admitted to the hospital on 1/17/2025  after undergoing Procedure(s) (LRB):  LEFT KNEE TOTAL ARTHROPLASTY NICKEL FREE (Left) without complications.    During the postoperative period,while in hospital, patient was medically managed by the hospitalist. Please see medial notes and H&P for patients additional diagnoses.  Ortho agrees with all medical diagnoses and treatments while patient in hospital.  No significant or unexpected findings or abnormal ortho imaging were noted during the hospital stay    Hospital course      Patient tolerated surgical procedure well and there was no complications. Patient progressed adequately through their recovery during hospital stay including PT and rehabilitation.    Patient was then D/C on  to home  in stable condition.  Patient was instructed on the use of pain medications, the signs and symptoms of infection, and was given our number to call should they have any questions or concerns following discharge.    Based on my clinical judgment, the patient was provided with a 7-day prescription for opioid medication at 30 MED, indicated for treatment of acute pain in the setting of recent right TKA. OARRS report was run and has demonstrated an appropriate time course.  The patient has been provided with counseling pertaining to safe use of opioid medication.    Pertinent Physical Exam At Time of Discharge  Alert, oriented x 3, cooperative with examination.     Examination of the right  extremity reveals Aquacel  dressing is intact without drainage.  No antonia-incisional ecchymosis.  EHL, plantarflexion, dorsiflexion are 4+/5.  Able to isometrically fire right quadriceps.  Sensory intact light touch in the deep/superficial/common peroneal, saphenous,  tibial, sural nerve distributions.  DP and popliteal pulses are 2+ with capillary refill less than 2 seconds.  Negative Homans' sign.      Home Medications     Medication List      START taking these medications     acetaminophen 325 mg tablet; Commonly known as: Tylenol; Take 2 tablets   (650 mg) by mouth every 6 hours if needed for mild pain (1 - 3).   cyclobenzaprine 10 mg tablet; Commonly known as: Flexeril; Take 1 tablet   (10 mg) by mouth 3 times a day as needed for muscle spasms for up to 7   days.   docusate sodium 100 mg capsule; Commonly known as: Colace; Take 1   capsule (100 mg) by mouth 2 times a day.   enoxaparin 40 mg/0.4 mL syringe; Commonly known as: Lovenox; Inject 0.4   mL (40 mg) under the skin once daily.; Start taking on: January 19, 2025   oxyCODONE 5 mg immediate release tablet; Commonly known as: Roxicodone;   Take 1 tablet (5 mg) by mouth every 6 hours if needed for moderate pain (4   - 6) for up to 7 days.     CONTINUE taking these medications     atorvastatin 10 mg tablet; Commonly known as: Lipitor; Take 1 tablet (10   mg) by mouth once daily.   drospirenone (contraceptive) 4 mg (28) tablet; Take 1 tablet by mouth   once daily.   solifenacin 10 mg tablet; Commonly known as: Vesicare; Take 1 tablet (10   mg) by mouth once daily. Swallow tablet whole; do not crush, chew, or   split.   valsartan 80 mg tablet; Commonly known as: Diovan; TAKE 1 TABLET(80 MG)   BY MOUTH EVERY DAY     STOP taking these medications     chlorhexidine 0.12 % solution; Commonly known as: Peridex           Patient may weight bear to operative extremity with use of walker for assistance with ambulation   Surgical dressing to be removed pod7 and incision left open to air  Lovenox 40MG subcutaneously daily for DVT prophylaxis started on 01/17/2025 and to be taken for 30 days  Follow up with surgeon in 2 weeks      Outpatient Follow-Up  Future Appointments   Date Time Provider Department Center   2/14/2025  8:15 AM  Carlitos Gallagher MD [id number] [place]          Pertinent Physical Exam At Time of Discharge  Physical Exam    Home Medications     Medication List      START taking these medications     acetaminophen 325 mg tablet; Commonly known as: Tylenol; Take 2 tablets   (650 mg) by mouth every 6 hours if needed for mild pain (1 - 3).   cyclobenzaprine 10 mg tablet; Commonly known as: Flexeril; Take 1 tablet   (10 mg) by mouth 3 times a day as needed for muscle spasms for up to 7   days.   docusate sodium 100 mg capsule; Commonly known as: Colace; Take 1   capsule (100 mg) by mouth 2 times a day.   enoxaparin 40 mg/0.4 mL syringe; Commonly known as: Lovenox; Inject 0.4   mL (40 mg) under the skin once daily.; Start taking on: January 19, 2025   oxyCODONE 5 mg immediate release tablet; Commonly known as: Roxicodone;   Take 1 tablet (5 mg) by mouth every 6 hours if needed for moderate pain (4   - 6) for up to 7 days.     CONTINUE taking these medications     atorvastatin 10 mg tablet; Commonly known as: Lipitor; Take 1 tablet (10   mg) by mouth once daily.   drospirenone (contraceptive) 4 mg (28) tablet; Take 1 tablet by mouth   once daily.   solifenacin 10 mg tablet; Commonly known as: Vesicare; Take 1 tablet (10   mg) by mouth once daily. Swallow tablet whole; do not crush, chew, or   split.   valsartan 80 mg tablet; Commonly known as: Diovan; TAKE 1 TABLET(80 MG)   BY MOUTH EVERY DAY     STOP taking these medications     chlorhexidine 0.12 % solution; Commonly known as: Peridex       Outpatient Follow-Up  Future Appointments   Date Time Provider Department Center   2/14/2025  8:15 AM Carlitos Gallagher MD [id number] [place]       [facility]

## 2025-01-18 NOTE — PROGRESS NOTES
Physical Therapy                 Therapy Communication Note    Patient Name: Keiry Kline  MRN: [id number]  Department: [facility] 4 OBS  Room: [facility]9/4119-A  Today's Date: 1/18/2025     Discipline: Physical Therapy    Missed Time: Attempt    Comment: Attempted 2nd visit for PT treatment. Pt politely declined requesting to rest prior to dc.

## 2025-01-18 NOTE — CARE PLAN
The patient's goals for the shift include  Patient will tolerate ambulating tonight    The clinical goals for the shift include Patient will remain safe during the shift    Over the shift, the patient did not make progress toward the following goals. Barriers to progression include patient still x1 assist tonight. Recommendations to address these barriers include continue to encourage ambulation  Problem: Pain - Adult  Goal: Verbalizes/displays adequate comfort level or baseline comfort level  Outcome: Progressing     Problem: Safety - Adult  Goal: Free from fall injury  Outcome: Progressing   .

## 2025-01-20 ENCOUNTER — HOME CARE VISIT (OUTPATIENT)
Dept: HOME HEALTH SERVICES | Facility: HOME HEALTH | Age: 45
End: 2025-01-20
Payer: COMMERCIAL

## 2025-01-20 PROCEDURE — G0151 HHCP-SERV OF PT,EA 15 MIN: HCPCS

## 2025-01-20 NOTE — Clinical Note
PT SOC performed 1/20/2025.  will be Cherelle Marrero, PT with Too Cleary PTA. Frequency is . 2w2. If you have not already done so, please put orders for outpt PT in the system. Pt's copay is $120 per visit. Declined OT.

## 2025-01-21 ENCOUNTER — HOME CARE VISIT (OUTPATIENT)
Dept: HOME HEALTH SERVICES | Facility: HOME HEALTH | Age: 45
End: 2025-01-21

## 2025-01-21 VITALS — HEIGHT: 62 IN | WEIGHT: 205 LBS | BODY MASS INDEX: 37.73 KG/M2

## 2025-01-21 SDOH — HEALTH STABILITY: PHYSICAL HEALTH: EXERCISE TYPE: HEP TKR

## 2025-01-21 SDOH — ECONOMIC STABILITY: HOUSING INSECURITY: HOME SAFETY: L TKR PRECAUTIONS

## 2025-01-21 ASSESSMENT — ENCOUNTER SYMPTOMS
LOSS OF SENSATION IN FEET: 0
PERSON REPORTING PAIN: PATIENT
FATIGUES EASILY: 1
LOWEST PAIN SEVERITY IN PAST 24 HOURS: 6/10
PAIN LOCATION - PAIN SEVERITY: 6/10
PAIN LOCATION - EXACERBATING FACTORS: AMB, WB
ARTHRALGIAS: 1
PAIN SEVERITY GOAL: 0/10
PAIN: 1
LIMITED RANGE OF MOTION: 1
HIGHEST PAIN SEVERITY IN PAST 24 HOURS: 6/10
LOWER EXTREMITY EDEMA: 1
PAIN LOCATION - PAIN DURATION: -
PAIN LOCATION - PAIN QUALITY: ACHE
PAIN LOCATION: LEFT KNEE
PAIN LOCATION - PAIN FREQUENCY: CONSTANT
OCCASIONAL FEELINGS OF UNSTEADINESS: 0
DEPRESSION: 0
MUSCLE WEAKNESS: 1

## 2025-01-21 ASSESSMENT — ACTIVITIES OF DAILY LIVING (ADL)
AMBULATION ASSISTANCE ON FLAT SURFACES: 1
AMBULATION ASSISTANCE: STAND BY ASSIST
OASIS_M1830: 05
ENTERING_EXITING_HOME: MODERATE ASSIST
PHYSICAL TRANSFERS ASSESSED: 1
AMBULATION ASSISTANCE: 1
CURRENT_FUNCTION: STAND BY ASSIST
PHYSICAL_TRANSFERS_DEVICES: VERBAL

## 2025-01-21 NOTE — HOME HEALTH
PT SOC performed. Consents to PT , not OT ,  precautions: TKR L LE  PLOF: indep bathing dsg, did not use AD . Going to school at [employer] for resp therapy, just started.   did family laundry, cooking , grocery shopping   Difficulty negotiating steps   works at [employer], uses manual lift to lift crates/pallets.

## 2025-01-22 ENCOUNTER — TELEPHONE (OUTPATIENT)
Dept: ORTHOPEDIC SURGERY | Facility: CLINIC | Age: 45
End: 2025-01-22
Payer: COMMERCIAL

## 2025-01-22 DIAGNOSIS — Z96.652 TOTAL KNEE REPLACEMENT STATUS, LEFT: ICD-10-CM

## 2025-01-22 RX ORDER — OXYCODONE HYDROCHLORIDE 5 MG/1
5 TABLET ORAL EVERY 6 HOURS PRN
Qty: 15 TABLET | Refills: 0 | Status: SHIPPED | OUTPATIENT
Start: 2025-01-22 | End: 2025-01-29

## 2025-01-23 ENCOUNTER — HOME CARE VISIT (OUTPATIENT)
Dept: HOME HEALTH SERVICES | Facility: HOME HEALTH | Age: 45
End: 2025-01-23
Payer: COMMERCIAL

## 2025-01-23 PROCEDURE — G0157 HHC PT ASSISTANT EA 15: HCPCS | Mod: CQ

## 2025-01-27 ENCOUNTER — HOME CARE VISIT (OUTPATIENT)
Dept: HOME HEALTH SERVICES | Facility: HOME HEALTH | Age: 45
End: 2025-01-27
Payer: COMMERCIAL

## 2025-01-27 DIAGNOSIS — M17.12 PRIMARY OSTEOARTHRITIS OF LEFT KNEE: ICD-10-CM

## 2025-01-27 DIAGNOSIS — Z47.1 AFTERCARE FOLLOWING LEFT KNEE JOINT REPLACEMENT SURGERY: ICD-10-CM

## 2025-01-27 DIAGNOSIS — Z96.652 AFTERCARE FOLLOWING LEFT KNEE JOINT REPLACEMENT SURGERY: ICD-10-CM

## 2025-01-27 PROCEDURE — G0157 HHC PT ASSISTANT EA 15: HCPCS | Mod: CQ

## 2025-01-29 ENCOUNTER — APPOINTMENT (OUTPATIENT)
Dept: RADIOLOGY | Facility: HOSPITAL | Age: 45
End: 2025-01-29
Payer: COMMERCIAL

## 2025-01-29 ENCOUNTER — APPOINTMENT (OUTPATIENT)
Dept: ORTHOPEDIC SURGERY | Facility: HOSPITAL | Age: 45
End: 2025-01-29
Payer: COMMERCIAL

## 2025-01-29 ENCOUNTER — APPOINTMENT (OUTPATIENT)
Dept: PREADMISSION TESTING | Facility: HOSPITAL | Age: 45
End: 2025-01-29
Payer: COMMERCIAL

## 2025-01-29 ENCOUNTER — HOME CARE VISIT (OUTPATIENT)
Dept: HOME HEALTH SERVICES | Facility: HOME HEALTH | Age: 45
End: 2025-01-29
Payer: COMMERCIAL

## 2025-01-29 PROCEDURE — G0157 HHC PT ASSISTANT EA 15: HCPCS | Mod: CQ

## 2025-02-01 ENCOUNTER — HOME CARE VISIT (OUTPATIENT)
Dept: HOME HEALTH SERVICES | Facility: HOME HEALTH | Age: 45
End: 2025-02-01
Payer: COMMERCIAL

## 2025-02-03 ASSESSMENT — ACTIVITIES OF DAILY LIVING (ADL)
OASIS_M1830: 00
HOME_HEALTH_OASIS: 00

## 2025-02-05 ENCOUNTER — APPOINTMENT (OUTPATIENT)
Dept: PHYSICAL THERAPY | Facility: CLINIC | Age: 45
End: 2025-02-05
Payer: COMMERCIAL

## 2025-02-05 ENCOUNTER — TELEPHONE (OUTPATIENT)
Dept: ORTHOPEDIC SURGERY | Facility: CLINIC | Age: 45
End: 2025-02-05
Payer: COMMERCIAL

## 2025-02-05 NOTE — TELEPHONE ENCOUNTER
PT CALLING WANTING TO BE CLEARED FOR DRIVING, STATES SHE IS NO LONGER ON PAIN MEDS AND NEEDS TO GET HERSELF TO PT APPTS.     PLEASE ADVISE AND CONTACT PT BACK [phone].

## 2025-02-06 NOTE — TELEPHONE ENCOUNTER
Lvm for patient she is cleared to drive as long as she is off pain meds. If she needs a note I can write one. Notified her to call back if that is needed.

## 2025-02-10 ENCOUNTER — APPOINTMENT (OUTPATIENT)
Dept: PHYSICAL THERAPY | Facility: CLINIC | Age: 45
End: 2025-02-10
Payer: COMMERCIAL

## 2025-02-10 DIAGNOSIS — R03.0 ELEVATED BLOOD PRESSURE READING: ICD-10-CM

## 2025-02-10 RX ORDER — VALSARTAN 80 MG/1
80 TABLET ORAL DAILY
Qty: 90 TABLET | Refills: 1 | Status: SHIPPED | OUTPATIENT
Start: 2025-02-10

## 2025-02-12 ENCOUNTER — APPOINTMENT (OUTPATIENT)
Dept: PHYSICAL THERAPY | Facility: CLINIC | Age: 45
End: 2025-02-12
Payer: COMMERCIAL

## 2025-02-14 ENCOUNTER — HOSPITAL ENCOUNTER (OUTPATIENT)
Dept: RADIOLOGY | Facility: CLINIC | Age: 45
Discharge: HOME | End: 2025-02-14
Payer: COMMERCIAL

## 2025-02-14 ENCOUNTER — APPOINTMENT (OUTPATIENT)
Dept: ORTHOPEDIC SURGERY | Facility: CLINIC | Age: 45
End: 2025-02-14
Payer: COMMERCIAL

## 2025-02-14 DIAGNOSIS — M17.12 PRIMARY OSTEOARTHRITIS OF LEFT KNEE: ICD-10-CM

## 2025-02-14 DIAGNOSIS — G89.18 POST-OPERATIVE PAIN: Primary | ICD-10-CM

## 2025-02-14 PROBLEM — M17.11 UNILATERAL PRIMARY OSTEOARTHRITIS, RIGHT KNEE: Status: ACTIVE | Noted: 2025-02-14

## 2025-02-14 PROCEDURE — 73562 X-RAY EXAM OF KNEE 3: CPT | Mod: LT

## 2025-02-14 RX ORDER — TRAMADOL HYDROCHLORIDE 50 MG/1
50 TABLET ORAL EVERY 6 HOURS PRN
Qty: 24 TABLET | Refills: 0 | Status: SHIPPED | OUTPATIENT
Start: 2025-02-14 | End: 2025-02-21

## 2025-02-16 DIAGNOSIS — E78.5 DYSLIPIDEMIA: ICD-10-CM

## 2025-02-16 DIAGNOSIS — N32.81 OVERACTIVE BLADDER: ICD-10-CM

## 2025-02-17 RX ORDER — SOLIFENACIN SUCCINATE 10 MG/1
TABLET, FILM COATED ORAL
Qty: 90 TABLET | Refills: 1 | Status: SHIPPED | OUTPATIENT
Start: 2025-02-17

## 2025-02-17 RX ORDER — ATORVASTATIN CALCIUM 10 MG/1
10 TABLET, FILM COATED ORAL DAILY
Qty: 90 TABLET | Refills: 1 | Status: SHIPPED | OUTPATIENT
Start: 2025-02-17

## 2025-02-19 ENCOUNTER — EVALUATION (OUTPATIENT)
Dept: PHYSICAL THERAPY | Facility: CLINIC | Age: 45
End: 2025-02-19
Payer: COMMERCIAL

## 2025-02-19 ENCOUNTER — APPOINTMENT (OUTPATIENT)
Dept: PHYSICAL THERAPY | Facility: CLINIC | Age: 45
End: 2025-02-19
Payer: COMMERCIAL

## 2025-02-19 DIAGNOSIS — M17.12 PRIMARY OSTEOARTHRITIS OF LEFT KNEE: Primary | ICD-10-CM

## 2025-02-19 DIAGNOSIS — Z47.1 AFTERCARE FOLLOWING LEFT KNEE JOINT REPLACEMENT SURGERY: ICD-10-CM

## 2025-02-19 DIAGNOSIS — Z96.652 AFTERCARE FOLLOWING LEFT KNEE JOINT REPLACEMENT SURGERY: ICD-10-CM

## 2025-02-19 PROCEDURE — 97161 PT EVAL LOW COMPLEX 20 MIN: CPT | Mod: GP

## 2025-02-19 PROCEDURE — 97110 THERAPEUTIC EXERCISES: CPT | Mod: GP

## 2025-02-19 ASSESSMENT — PATIENT HEALTH QUESTIONNAIRE - PHQ9
2. FEELING DOWN, DEPRESSED OR HOPELESS: NOT AT ALL
1. LITTLE INTEREST OR PLEASURE IN DOING THINGS: NOT AT ALL
SUM OF ALL RESPONSES TO PHQ9 QUESTIONS 1 AND 2: 0

## 2025-02-19 ASSESSMENT — COLUMBIA-SUICIDE SEVERITY RATING SCALE - C-SSRS
2. HAVE YOU ACTUALLY HAD ANY THOUGHTS OF KILLING YOURSELF?: NO
6. HAVE YOU EVER DONE ANYTHING, STARTED TO DO ANYTHING, OR PREPARED TO DO ANYTHING TO END YOUR LIFE?: NO
1. IN THE PAST MONTH, HAVE YOU WISHED YOU WERE DEAD OR WISHED YOU COULD GO TO SLEEP AND NOT WAKE UP?: NO

## 2025-02-19 ASSESSMENT — ENCOUNTER SYMPTOMS
DEPRESSION: 0
OCCASIONAL FEELINGS OF UNSTEADINESS: 1
LOSS OF SENSATION IN FEET: 0

## 2025-02-19 NOTE — PROGRESS NOTES
Physical Therapy Evaluation and Treatment      Patient Name: Keiry Kline  MRN: [id number]  Today's Date: 2/19/2025     Time Calculation  Start Time: 1000  Stop Time: 1045  Time Calculation (min): 45 min  PT Therapeutic Procedures Time Entry  Therapeutic Exercise Time Entry: 30  PT Evaluation (Low) Time Entry: 15    Visit: 1/MN  Referred by:  Carlitos Gallagher MD, Referring Provider     Insurance:   [facility]   Certification Dates:  2-19-25 to 3-27-25  Current Problem:   Problem List Items Addressed This Visit    None  Visit Diagnoses         Codes    Primary osteoarthritis of left knee    -  Primary M17.12    Relevant Orders    Follow Up In Physical Therapy    Aftercare following left knee joint replacement surgery     Z47.1, Z96.652    Relevant Orders    Follow Up In Physical Therapy          1. Primary osteoarthritis of left knee  Referral to Physical Therapy    Follow Up In Physical Therapy      2. Aftercare following left knee joint replacement surgery  Referral to Physical Therapy    Follow Up In Physical Therapy               Surgery:  Lt TKR, 1-17-25  HPI:  Degenerative, started when she was sprinting a 50 m dash in 2023, then orthopedic issues started.     Subjective:  Pt stated she just got off the RW & transitioned to a std cane.    Patient's Living Environment/PLOF: Independent with all ADL's prior to current injury, lives with spouse who is a olivera.  She works as a  for [employer].  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      Home Living: No concerns, lives in split level, has double railing on all stairs. One step into house, has railing to use  Patient's Therapy Goals: To decrease pain, return to PLOF     Pain:  Intensity:  5/10, surgical             Location: Lt knee, diffuse            Duration:  intermittent            Aggravating Factor: prolonged WB, positional, end ranges            Alleviating Factor:  Tramadol,  Tylenol, rest            Clinical Progression: Gradually improving     Social Factors:  1 personal factor (she carried the insurance, when she is in clinicals x 1 yr, worried about coverage) &/1-2 comorbidities (HTN, OA Rt knee)     Precautions:  WBAT     Objective   Knee AROM: (degrees) Left Right   Flexion 100 140   Extension -20 0   Flexibility     Hamstrings -22 -12   Gastroc 16 26     Knee Strength: MMT Left Right   Flexion -3/5 5/5   Extension -3/5 5/5   Hip Flexion 4/5 5/5   Hip Abduction -4/5 5/5   Hip Adduction 4/5 5/5   Ankle DF 5/5 5/5   Ankle PF 5/5 5/5   *denotes pain with motion or muscle testing    Swelling/Girth:  per visual inspection, mild through Lt knee  Palpation:  minimal to none tenderness to palpation  Patellar mobility:  Good all directions    HEP Compliance:  100%  Transfers:  + use of hands to push up from low surfaces  Gait:  Independent w/o assistive device no gait deficits  Stairs:  Can do reciprocal stairclimbing for ascending w/ 1 rail, descending is a step to pattern  Balance:  Lt impaired, 2 sec prior to stepping strategy or grabbing onto external support.    Outcome Measures:  LEFS: 30/80, 37.5%  Follow Up with MD:  3-14-25    Treatments:     Access Code: [email or web address]  URL: [email or web address]  Date: 02/19/2025  Prepared by: Berta Loco    Exercises  - Seated Long Arc Quad  - 1 x daily - 7 x weekly - 3 sets - 10 reps  - Prone Knee Extension with Ankle Weight  - 1 x daily - 7 x weekly - 3 sets - 10 reps  - Seated Hamstring Curl with Anchored Resistance  - 1 x daily - 7 x weekly - 3 sets - 10 reps  - Mini Squat with Counter Support  - 1 x daily - 7 x weekly - 3 sets - 10 reps  - Upright Bike Pendulum Swings  - 1 x daily - 7 x weekly - 3 sets - 10 reps    Assessment:     Pt is a 44 yo female who presented to the clinic today s/p Lt TKR, and is scheduled for Rt TKR on 3-28-25.  PMH:  BMI: 37.49 kg/m² , HTN, OA Knees.  Examination reveals the following  deficits: decreased Lt knee AROM, decreased flexibility/hamstrings, decreased WB/positional tolerances. gait abnormalities, decreased Lt knee/LE strength & balance Lt LE. These deficits lead to difficulties with ADLs as well as recreational activity.  Skilled PT will address these deficits to help reduce pain for return to PLOF.  He will benefit from skilled PT to address the above mentioned impairments.  Signs and symptoms consistent with  . Low complexity due to patient's clinical presentation being stable and uncomplicated by any significant comorbidities that may affect rehab tolerance and progression.     Clinical presentation:  Stable and/or uncomplicated characteristics, evolving      Problem List:  decreased Lt knee AROM, decreased flexibility/hamstrings, decreased WB/positional tolerances. gait abnormalities, decreased Lt knee/LE strength & balance Lt LE  Plan: Continue per POC & pt tolerance.  Frequency/duration: 2x/wk for 12 visits      Treatment Interventions:  MT, therapeutic exercise, neuro re-ed, modalities prn (education/ Instruction, ,Self care/ home management), Therapeutic activities, Gait training.    PT Plan: Skilled PT  Patient/caregiver agrees with POC:  yes    Rehab potential:  Good  Plan of care agreement: Patient understands & agrees with goals & plan documented    EDUCATION: HEP, POC, activity modifications/progression, pain management/modalities, and injury pathology     TODAY'S TREATMENT  Evaluation of the Lt Knee completed.  Pt was educated on their dx and the pertinent anatomy.  Pt was given appropriate referrals.  HEP as seen above:    Goals:   Active       PT Problem       PT Goal 1       Start:  02/19/25    Expected End:  03/27/25       Pt independent with HEP and consistent for optimal recovery and self-management after DC from PT.           PT Goal 2       Start:  02/19/25    Expected End:  03/27/25       Pt will improve left knee AROM to 0-120 to improve functional mobility for  ADL's, school &  work.         PT Goal 3       Start:  02/19/25    Expected End:  03/27/25       Patient will increase Lt knee / LE strength to +4/5 to improve functional mobility for ADL's, school & work.            PT Goal 4       Start:  02/19/25    Expected End:  03/27/25       Pt will increase LEFS score to > or = 80% to demonstrate functional improvement.

## 2025-02-21 ENCOUNTER — APPOINTMENT (OUTPATIENT)
Dept: PHYSICAL THERAPY | Facility: CLINIC | Age: 45
End: 2025-02-21
Payer: COMMERCIAL

## 2025-02-24 ENCOUNTER — APPOINTMENT (OUTPATIENT)
Dept: PHYSICAL THERAPY | Facility: CLINIC | Age: 45
End: 2025-02-24
Payer: COMMERCIAL

## 2025-02-26 ENCOUNTER — TREATMENT (OUTPATIENT)
Dept: PHYSICAL THERAPY | Facility: CLINIC | Age: 45
End: 2025-02-26
Payer: COMMERCIAL

## 2025-02-26 ENCOUNTER — APPOINTMENT (OUTPATIENT)
Dept: PHYSICAL THERAPY | Facility: CLINIC | Age: 45
End: 2025-02-26
Payer: COMMERCIAL

## 2025-02-26 DIAGNOSIS — Z96.652 AFTERCARE FOLLOWING LEFT KNEE JOINT REPLACEMENT SURGERY: ICD-10-CM

## 2025-02-26 DIAGNOSIS — M17.12 PRIMARY OSTEOARTHRITIS OF LEFT KNEE: ICD-10-CM

## 2025-02-26 DIAGNOSIS — Z47.1 AFTERCARE FOLLOWING LEFT KNEE JOINT REPLACEMENT SURGERY: ICD-10-CM

## 2025-02-26 PROCEDURE — 97110 THERAPEUTIC EXERCISES: CPT | Mod: GP

## 2025-02-26 NOTE — PROGRESS NOTES
Physical Therapy Treatment Note     Patient Name: Keriy Kline  MRN: [id number]  Today's Date: 2/26/2025     Time Calculation  Start Time: 1315  Stop Time: 1400  Time Calculation (min): 45 min  PT Therapeutic Procedures Time Entry  Therapeutic Exercise Time Entry: 45    Visit: 2/MN  Referred by:  Carlitos Gallagher MD, Referring Provider     Insurance:   [email or web address] [facility]   Certification Dates:  2-19-25 to 3-27-25  Current Problem:   Problem List Items Addressed This Visit    None  Visit Diagnoses         Codes    Primary osteoarthritis of left knee     M17.12    Aftercare following left knee joint replacement surgery     Z47.1, Z96.652          1. Primary osteoarthritis of left knee  Follow Up In Physical Therapy      2. Aftercare following left knee joint replacement surgery  Follow Up In Physical Therapy               Surgery:  Lt TKR, 1-17-25  HPI:  Degenerative, started when she was sprinting a 50 m dash in 2023, then orthopedic issues started.     Subjective:  Pt stated she forgot to take her pain meds today before she came.     Patient's Living Environment/PLOF: Independent with all ADL's prior to current injury, lives with spouse who is a olivera.  She works as a  for [employer].  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      Home Living: No concerns, lives in split level, has double railing on all stairs. One step into house, has railing to use  Patient's Therapy Goals: To decrease pain, return to PLOF     Pain:  Intensity:  5/10, surgical             Location: Lt knee, diffuse            Duration:  intermittent            Aggravating Factor: prolonged WB, positional, end ranges            Alleviating Factor:  Tramadol, Tylenol, rest            Clinical Progression: Gradually improving     Social Factors:  1 personal factor (she carried the insurance, when she is in clinicals x 1 yr, worried about coverage) &/1-2 comorbidities (HTN, OA Rt  knee)     Precautions:  WBAT     Objective     Swelling/Girth:  per visual inspection, mild through Lt knee  Palpation:  minimal to none tenderness to palpation  Patellar mobility:  Good all directions    HEP Compliance:  100%  Transfers:  + use of hands to push up from low surfaces  Gait:  Independent w/o assistive device no gait deficits  Stairs:  Can do reciprocal stairclimbing for ascending w/ 1 rail, descending is a step to pattern  Balance:  Lt impaired, 2 sec prior to stepping strategy or grabbing onto external support.    Outcome Measures:  LEFS: 30/80, 37.5%  Follow Up with MD:  3-14-25    Treatments:     Access Code: [email or web address]  URL: [email or web address]  Date: 02/19/2025  Prepared by: Berta Huff-Cherelle    Exercises  - Seated LAQ,  3 x 10 reps, 1.5 lbs  - Prone Knee Extension with Ankle Weight,  3 Min  - Seated Hamstring Curl with Anchored Resistance, green, 2 x 10 reps  - Mini Squats,   - Bike 5 Min, Level 1    Rockerboard, A/P, 20 x    Calf Stretch, 3 x 30 sec hold    4 inch Step Ups, 2 x 10, with UE support    Heel slides, 10 x 5 sec hold    Prone Quad Stretch, 3 x 30 sec hold    Assessment:     Pt stated her Rt knee is starting to act up as she's using both LE's for rehab, the Lt is finally starting to be her better leg.  Rt   TKR scheduled 3-28-25.  All activities require UE support.  She is still using std cane to walk, however, more due to her Rt knee than her Lt knee.  She is progressing well to date.  Valgus noted Rt knee.     Plan: Continue per POC & pt tolerance.  Frequency/duration: 2x/wk for 12 visits

## 2025-02-28 ENCOUNTER — APPOINTMENT (OUTPATIENT)
Dept: PHYSICAL THERAPY | Facility: CLINIC | Age: 45
End: 2025-02-28
Payer: COMMERCIAL

## 2025-02-28 DIAGNOSIS — M17.11 UNILATERAL PRIMARY OSTEOARTHRITIS, RIGHT KNEE: ICD-10-CM

## 2025-03-03 ENCOUNTER — APPOINTMENT (OUTPATIENT)
Facility: CLINIC | Age: 45
End: 2025-03-03
Payer: COMMERCIAL

## 2025-03-10 ENCOUNTER — TREATMENT (OUTPATIENT)
Dept: PHYSICAL THERAPY | Facility: CLINIC | Age: 45
End: 2025-03-10
Payer: COMMERCIAL

## 2025-03-10 ENCOUNTER — APPOINTMENT (OUTPATIENT)
Dept: PRIMARY CARE | Facility: CLINIC | Age: 45
End: 2025-03-10
Payer: COMMERCIAL

## 2025-03-10 DIAGNOSIS — M17.12 PRIMARY OSTEOARTHRITIS OF LEFT KNEE: ICD-10-CM

## 2025-03-10 DIAGNOSIS — Z11.59 SCREENING FOR VIRAL DISEASE: ICD-10-CM

## 2025-03-10 DIAGNOSIS — Z11.1 SCREENING EXAMINATION FOR PULMONARY TUBERCULOSIS: ICD-10-CM

## 2025-03-10 DIAGNOSIS — Z01.84 IMMUNITY STATUS TESTING: ICD-10-CM

## 2025-03-10 DIAGNOSIS — Z11.1 SCREENING-PULMONARY TB: ICD-10-CM

## 2025-03-10 DIAGNOSIS — R03.0 ELEVATED BLOOD PRESSURE READING: ICD-10-CM

## 2025-03-10 DIAGNOSIS — I10 PRIMARY HYPERTENSION: Primary | ICD-10-CM

## 2025-03-10 DIAGNOSIS — Z96.652 TOTAL KNEE REPLACEMENT STATUS, LEFT: ICD-10-CM

## 2025-03-10 DIAGNOSIS — Z96.652 AFTERCARE FOLLOWING LEFT KNEE JOINT REPLACEMENT SURGERY: ICD-10-CM

## 2025-03-10 DIAGNOSIS — Z47.1 AFTERCARE FOLLOWING LEFT KNEE JOINT REPLACEMENT SURGERY: ICD-10-CM

## 2025-03-10 DIAGNOSIS — E78.5 DYSLIPIDEMIA: ICD-10-CM

## 2025-03-10 PROCEDURE — 90471 IMMUNIZATION ADMIN: CPT | Performed by: FAMILY MEDICINE

## 2025-03-10 PROCEDURE — 99214 OFFICE O/P EST MOD 30 MIN: CPT | Performed by: FAMILY MEDICINE

## 2025-03-10 PROCEDURE — 97530 THERAPEUTIC ACTIVITIES: CPT | Mod: GP

## 2025-03-10 PROCEDURE — 3079F DIAST BP 80-89 MM HG: CPT | Performed by: FAMILY MEDICINE

## 2025-03-10 PROCEDURE — 3075F SYST BP GE 130 - 139MM HG: CPT | Performed by: FAMILY MEDICINE

## 2025-03-10 PROCEDURE — 3008F BODY MASS INDEX DOCD: CPT | Performed by: FAMILY MEDICINE

## 2025-03-10 PROCEDURE — 97110 THERAPEUTIC EXERCISES: CPT | Mod: GP

## 2025-03-10 PROCEDURE — 1036F TOBACCO NON-USER: CPT | Performed by: FAMILY MEDICINE

## 2025-03-10 PROCEDURE — 90715 TDAP VACCINE 7 YRS/> IM: CPT | Performed by: FAMILY MEDICINE

## 2025-03-10 ASSESSMENT — PATIENT HEALTH QUESTIONNAIRE - PHQ9
1. LITTLE INTEREST OR PLEASURE IN DOING THINGS: NOT AT ALL
2. FEELING DOWN, DEPRESSED OR HOPELESS: NOT AT ALL
SUM OF ALL RESPONSES TO PHQ9 QUESTIONS 1 AND 2: 0

## 2025-03-10 ASSESSMENT — ENCOUNTER SYMPTOMS
OCCASIONAL FEELINGS OF UNSTEADINESS: 0
DEPRESSION: 0
LOSS OF SENSATION IN FEET: 0

## 2025-03-10 NOTE — PROGRESS NOTES
Physical Therapy Treatment Note     Patient Name: Keiry Kline  MRN: [id number]  Today's Date: 3/10/2025     Time Calculation  Start Time: 1345  Stop Time: 1430  Time Calculation (min): 45 min  PT Therapeutic Procedures Time Entry  Therapeutic Exercise Time Entry: 35  Therapeutic Activity Time Entry: 10    Visit: 3/MN  Referred by:  Carlitos Gallagher MD, Referring Provider     Insurance:   [facility]   Certification Dates:  2-19-25 to 3-27-25  Current Problem:   Problem List Items Addressed This Visit    None  Visit Diagnoses         Codes    Primary osteoarthritis of left knee     M17.12    Aftercare following left knee joint replacement surgery     Z47.1, Z96.652          1. Primary osteoarthritis of left knee  Follow Up In Physical Therapy      2. Aftercare following left knee joint replacement surgery  Follow Up In Physical Therapy               Surgery:  Lt TKR, 1-17-25  HPI:  Degenerative, started when she was sprinting a 50 m dash in 2023, then orthopedic issues started.     Subjective:  Pt stated she is still taking pain meds.  She takes OTC more & Tramadol if she really needs it.       Patient's Living Environment/PLOF: Independent with all ADL's prior to current injury, lives with spouse who is a olivera.  She works as a  for [employer].  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      Home Living: No concerns, lives in split level, has double railing on all stairs. One step into house, has railing to use  Patient's Therapy Goals: To decrease pain, return to PLOF     Pain:  Intensity:  5/10, surgical             Location: Lt knee, diffuse, popliteal fossa            Duration:  intermittent            Aggravating Factor: prolonged WB, positional, end ranges            Alleviating Factor:  Tramadol, Tylenol, rest            Clinical Progression: Gradually improving     Social Factors:  1 personal factor (she carried the insurance,  when she is in clinicals x 1 yr, worried about coverage) &/1-2 comorbidities (HTN, OA Rt knee)     Precautions:  WBAT     Objective     Lt Knee AROM:  -8 to 101 with pain at end ranges    Swelling/Girth:  per visual inspection, mild through Lt knee  Palpation:  minimal to none tenderness to palpation  Patellar mobility:  Good all directions    HEP Compliance:  100%  Transfers:  + use of hands to push up from low surfaces  Gait:  Independent w/o assistive device no gait deficits  Stairs:  Can do reciprocal stairclimbing for ascending w/ 1 rail, descending is a step to pattern, mostly due to Rt knee now  Balance:  Lt impaired, 10 sec prior to stepping strategy or grabbing onto external support.    Outcome Measures:  LEFS: 30/80, 37.5%  Follow Up with MD:  3-14-25    Treatments:     Access Code: [email or web address]  URL: [email or web address]  Date: 02/19/2025  Prepared by: Berta Huff-Cherelle    Exercises    - Seated LAQ,  3 x 10, 1.5 lbs  - Prone Knee Ext, 3 Min, 1 lb  - Seated Hamstring Curl, green, 2 x 10 reps  - Mini Squats,   - Bike 5 Min, Level 1    Rockerboard, A/P, 20 x    Calf Stretch, 3 x 30 sec hold    Heel slides, 10 x 5 sec hold    Prone Quad Stretch, 3 x 30 sec hold      Therapeutic Activity        4 inch Step Ups, 2 x 10, with UE support    4 inch Up & Overs, 10 x 2    2 inch Tap Downs, 2 x 10    Assessment:     Pt continues to state her Rt knee is bothering her since she started rehab on her Lt knee.  Rt TKR scheduled 3-28-25 in 18 days.  She was able to perform 2 inch tapdowns w/o holding on for some of the reps.  She is still using std cane to walk, however, more due to her Rt knee than her Lt knee.  She is progressing well to date.     Plan: Continue per POC & pt tolerance.  Frequency/duration: 2x/wk for 12 visits

## 2025-03-12 ENCOUNTER — TREATMENT (OUTPATIENT)
Dept: PHYSICAL THERAPY | Facility: CLINIC | Age: 45
End: 2025-03-12
Payer: COMMERCIAL

## 2025-03-12 DIAGNOSIS — M17.12 PRIMARY OSTEOARTHRITIS OF LEFT KNEE: Primary | ICD-10-CM

## 2025-03-12 DIAGNOSIS — Z96.652 AFTERCARE FOLLOWING LEFT KNEE JOINT REPLACEMENT SURGERY: ICD-10-CM

## 2025-03-12 DIAGNOSIS — Z47.1 AFTERCARE FOLLOWING LEFT KNEE JOINT REPLACEMENT SURGERY: ICD-10-CM

## 2025-03-12 PROCEDURE — 97530 THERAPEUTIC ACTIVITIES: CPT | Mod: GP

## 2025-03-12 PROCEDURE — 97110 THERAPEUTIC EXERCISES: CPT | Mod: GP

## 2025-03-12 NOTE — PROGRESS NOTES
Physical Therapy Treatment Note     Patient Name: Keiry Kline  MRN: [id number]  Today's Date: 3/12/2025     Time Calculation  Start Time: 1230  Stop Time: 1315  Time Calculation (min): 45 min  PT Therapeutic Procedures Time Entry  Therapeutic Exercise Time Entry: 35  Therapeutic Activity Time Entry: 10    Visit: 4/MN  Referred by:  Carlitos Gallagher MD, Referring Provider     Insurance:   [facility]   Certification Dates:  2-19-25 to 3-27-25  Current Problem:   Problem List Items Addressed This Visit    None  Visit Diagnoses         Codes    Primary osteoarthritis of left knee    -  Primary M17.12    Aftercare following left knee joint replacement surgery     Z47.1, Z96.652          1. Primary osteoarthritis of left knee  Follow Up In Physical Therapy      2. Aftercare following left knee joint replacement surgery  Follow Up In Physical Therapy               Surgery:  Lt TKR, 1-17-25  HPI:  Degenerative, started when she was sprinting a 50 m dash in 2023, then orthopedic issues started.     Subjective:  Pt stated she is still taking pain meds.  She takes OTC more & Tramadol if she really needs it.       Patient's Living Environment/PLOF: Independent with all ADL's prior to current injury, lives with spouse who is a olivera.  She works as a  for [employer].  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      Home Living: No concerns, lives in split level, has double railing on all stairs. One step into house, has railing to use  Patient's Therapy Goals: To decrease pain, return to PLOF     Pain:  Intensity:  5/10, surgical             Location: Lt knee, diffuse, popliteal fossa            Duration:  intermittent            Aggravating Factor: prolonged WB, positional, end ranges            Alleviating Factor:  Tramadol, Tylenol, rest            Clinical Progression: Gradually improving     Social Factors:  1 personal factor (she carried the  insurance, when she is in clinicals x 1 yr, worried about coverage) &/1-2 comorbidities (HTN, OA Rt knee)     Precautions:  WBAT     Objective     Lt Knee AROM: -2 to 105 with pain at end ranges    Swelling/Girth:  per visual inspection, mild through Lt knee  Palpation:  minimal to none tenderness to palpation  Patellar mobility:  Good all directions    HEP Compliance:  100%  Transfers:  + use of hands to push up from low surfaces  Gait:  Independent w/o assistive device no gait deficits  Stairs:  Can do reciprocal stairclimbing for ascending w/ 1 rail, descending is a step to pattern, mostly due to Rt knee now  Balance:  Lt impaired, 10 sec prior to stepping strategy or grabbing onto external support.    Outcome Measures:  LEFS: 30/80, 37.5%  Follow Up with MD:  3-14-25    Treatments:     Access Code: [email or web address]  URL: [email or web address]  Date: 02/19/2025  Prepared by: Berta Huff-Cherelle    Exercises    - Seated LAQ,  3 x 10, 1.5 lbs  - Prone Knee Ext, 3 Min, 1 lb  - Seated Hamstring Curl, green, 2 x 10 reps  - Mini Squats,   - Bike 5 Min, Level 1    Rockerboard, A/P, 20 x    Calf Stretch, 3 x 30 sec hold    Heel slides, 10 x 5 sec hold    Prone Quad Stretch, 3 x 30 sec hold      Therapeutic Activity        4 inch Step Ups, 2 x 10, with UE support    4 inch Up & Overs, 10 x 2    2 inch Tap Downs, 2 x 10    Plyothrow, Red 10 x    Assessment:     Pt gained a little in Lt knee ext & flexion today.  She is also demonstrating better control with steps.  Rt TKR scheduled for 3-28-25.  She was able to perform 2 inch tapdowns w/o holding on, and plyo throw for 5 throws prior to engaging a stepping strategy.  She is progressing well to date.     Plan: Continue per POC & pt tolerance.  Frequency/duration: 2x/wk for 12 visits

## 2025-03-13 DIAGNOSIS — Z11.1 SCREENING-PULMONARY TB: Primary | ICD-10-CM

## 2025-03-13 LAB
HBV SURFACE AB SERPL IA-ACNC: 31 MIU/ML
M TB IFN-G BLD-IMP: NORMAL
MEV IGG SER IA-ACNC: 26.5 AU/ML
MUV IGG SER IA-ACNC: 197 AU/ML
RUBV IGG SERPL IA-ACNC: 18.2 INDEX
VZV IGG SER IA-ACNC: 11.8 S/CO

## 2025-03-14 ENCOUNTER — APPOINTMENT (OUTPATIENT)
Dept: ORTHOPEDIC SURGERY | Facility: CLINIC | Age: 45
End: 2025-03-14
Payer: COMMERCIAL

## 2025-03-14 ENCOUNTER — TELEPHONE (OUTPATIENT)
Dept: PRIMARY CARE | Facility: CLINIC | Age: 45
End: 2025-03-14

## 2025-03-14 DIAGNOSIS — G89.18 POST-OPERATIVE PAIN: ICD-10-CM

## 2025-03-14 DIAGNOSIS — M17.0 PRIMARY OSTEOARTHRITIS OF BOTH KNEES: ICD-10-CM

## 2025-03-14 RX ORDER — METHYLPREDNISOLONE 4 MG/1
TABLET ORAL
Qty: 21 TABLET | Refills: 0 | Status: SHIPPED | OUTPATIENT
Start: 2025-03-14

## 2025-03-14 NOTE — PROGRESS NOTES
History of Present Illness:  Patient returns today for follow-up regarding left total knee arthroplasty endorsing mild pain.  Patient notes improving functional status.    Regarding the right knee she has underlying known severe DJD with significant varus angulation and erosion of the medial tibia, set up for knee replacement on 3/28/2025.     Physical Examination:  Regarding left knee:   Well healed incision   ROM: 0-1 10  No laxity to varus / valgus stress  + Plantar/dorsiflexion  Negative Madalyn test    Regarding right knee:  Right knee:  Skin healthy and intact  No gross swelling or ecchymosis  Alignment: significant varus  Effusion: Moderate  ROM: 0-100  Crepitance with range of motion  No pain with internal rotation of the hip  Tenderness to palpation: Diffuse     No laxity to valgus stress  No laxity to varus stress  Negative Lachman´s test  Negative posterior drawer test  Mild pain with Trenton´s test     Neurovascular exam normal distally  2+ DP pulse and good cap refill    Assessment:  Patient status post left total knee arthroplasty, doing well  Right knee severe DJD, pending TKA    Plan:  Discussed analgesics.  Reviewed range of motion, strength goals.  Discussed activities to avoid  Dental prophylaxis discussed.    Regarding right knee:  The patient has failed to improve with multiple non-operative modalities.  There is increasing difficulty with activities of daily living and concern for falls.  The patient is endorsing severe pain and disability.      We had a lengthy discussion regarding total knee arthroplasty including the orthopaedic risks, including but not limited to: stiffness, infection, hematoma, early aseptic loosening, neurologic or vascular injury, clicking, difficulty kneeling and incomplete relief of pain.  We reviewed the medical risks, including but not limited to: deep venous thrombosis, pulmonary embolism, and cardiovascular/pulmonary issues.    We discussed the anticipated  longevity of the implants and potential for revision surgery.  We also discussed anticoagulation, rehabilitation goals, and the hospital course.  We discussed the likelihood of opioid analgesics and risks associated with them.    The next step is to obtain medical risk stratification and encouraged the pre-operative information seminar at the hospital.  We are happy to provide assistance and counseling if the patient has any additional concerns.     Follow-up:  post-op for right knee.     Prashanth Alvarez PA-C     In a face to face encounter, I evaluated the patient and performed a physical examination, discussed pertinent diagnostic studies if indicated and discussed diagnosis and management strategies with both the patient and physician assistant / nurse practitioner.  I reviewed the PA/NP's note and agree with the documented findings and plan of care.    We reviewed surgery with the patient, she did well on the contralateral side.  We discussed continued range of motion predominately for the last few degrees of extension.  Patient amenable to treatment plan Medrol Dosepak prescribed.    Carlitos Gallagher MD

## 2025-03-14 NOTE — TELEPHONE ENCOUNTER
Patient called requesting proof of flu shot beack at physical on 12/17/24. I printed out immunizations and per patient request I emailed them to her.

## 2025-03-16 VITALS
HEIGHT: 62 IN | SYSTOLIC BLOOD PRESSURE: 130 MMHG | DIASTOLIC BLOOD PRESSURE: 88 MMHG | BODY MASS INDEX: 38.46 KG/M2 | HEART RATE: 84 BPM | WEIGHT: 209 LBS

## 2025-03-16 PROBLEM — Z01.84 IMMUNITY STATUS TESTING: Status: ACTIVE | Noted: 2025-03-16

## 2025-03-16 PROBLEM — I10 PRIMARY HYPERTENSION: Status: ACTIVE | Noted: 2025-03-16

## 2025-03-16 PROBLEM — E66.01 CLASS 2 SEVERE OBESITY DUE TO EXCESS CALORIES WITH SERIOUS COMORBIDITY AND BODY MASS INDEX (BMI) OF 38.0 TO 38.9 IN ADULT: Status: ACTIVE | Noted: 2025-03-16

## 2025-03-16 PROBLEM — I10 HYPERTENSION, ESSENTIAL, BENIGN: Status: RESOLVED | Noted: 2023-06-19 | Resolved: 2025-03-16

## 2025-03-16 PROBLEM — Z11.1 SCREENING EXAMINATION FOR PULMONARY TUBERCULOSIS: Status: ACTIVE | Noted: 2025-03-16

## 2025-03-16 PROBLEM — Z11.1 SCREENING-PULMONARY TB: Status: ACTIVE | Noted: 2025-03-16

## 2025-03-16 PROBLEM — Z11.59 SCREENING FOR VIRAL DISEASE: Status: ACTIVE | Noted: 2025-03-16

## 2025-03-16 PROBLEM — E66.812 CLASS 2 SEVERE OBESITY DUE TO EXCESS CALORIES WITH SERIOUS COMORBIDITY AND BODY MASS INDEX (BMI) OF 38.0 TO 38.9 IN ADULT: Status: ACTIVE | Noted: 2025-03-16

## 2025-03-16 RX ORDER — VALSARTAN 80 MG/1
80 TABLET ORAL DAILY
Qty: 90 TABLET | Refills: 1 | Status: SHIPPED | OUTPATIENT
Start: 2025-03-16

## 2025-03-16 RX ORDER — TRAMADOL HYDROCHLORIDE 50 MG/1
50 TABLET ORAL EVERY 6 HOURS PRN
COMMUNITY
End: 2025-03-29 | Stop reason: HOSPADM

## 2025-03-16 RX ORDER — SOLIFENACIN SUCCINATE 10 MG/1
10 TABLET, FILM COATED ORAL DAILY
COMMUNITY

## 2025-03-16 RX ORDER — ACETAMINOPHEN 325 MG/1
650 TABLET ORAL EVERY 4 HOURS PRN
COMMUNITY

## 2025-03-16 RX ORDER — ATORVASTATIN CALCIUM 10 MG/1
10 TABLET, FILM COATED ORAL DAILY
Qty: 90 TABLET | Refills: 1 | Status: SHIPPED | OUTPATIENT
Start: 2025-03-16

## 2025-03-16 NOTE — ASSESSMENT & PLAN NOTE
Take Crestor every day follow diet rich in healthy fats fruits vegetables whole grains and low in saturated fat

## 2025-03-16 NOTE — PROGRESS NOTES
Assessment and Plan:  Problem List Items Addressed This Visit       Dyslipidemia    Current Assessment & Plan     Take Crestor every day follow diet rich in healthy fats fruits vegetables whole grains and low in saturated fat         Elevated blood pressure reading    Total knee replacement status, left    Current Assessment & Plan     Has been doing rehab pain and mobility improving         Immunity status testing    Relevant Orders    Rubeola Antibody, IgG (Completed)    Varicella Zoster Antibody, IgG (Completed)    Mumps Antibody, IgG (Completed)    Rubella Antibody, IgG (Completed)    Hepatitis B Surface Antibody (Completed)    Screening-pulmonary TB    Relevant Orders    T-Spot TB (Completed)    Screening examination for pulmonary tuberculosis    Relevant Orders    T-Spot TB (Completed)    Screening for viral disease    Relevant Orders    Rubeola Antibody, IgG (Completed)    Varicella Zoster Antibody, IgG (Completed)    Mumps Antibody, IgG (Completed)    Rubella Antibody, IgG (Completed)    Primary hypertension - Primary    Current Assessment & Plan     Continue valsartan monitor blood pressure at home              HPI:  Patient is here for follow-up of hypertension hyperlipidemia has been taking meds as prescribed denies chest pain shortness of breath current allergies myalgias dizziness been eating healthy and trying to increase exercise    Had left knee replacement has been working on range of motion exercises  Needs immunity status testing for school needs forms completed also needs TB screen    ROS   Constitutional:  o weight loss. Negative for chills and fever.   HENT: Negative.     Respiratory: Negative.     Cardiovascular: Negative.    Gastrointestinal: Negative.  Negative for nausea and vomiting.   Endocrine: Negative.    Genitourinary: Negative.    Musculoskeletal: Negative.  Knee pain  Skin: Negative.  Negative for rash.   Allergic/Immunologic: Negative.    Neurological: Negative.    Hematological:  "Negative.    Psychiatric/Behavioral: Negative.     All other systems reviewed and are negative.      /88   Pulse 84   Ht 1.575 m (5' 2\")   Wt 94.8 kg (209 lb)   BMI 38.23 kg/m²   Body mass index is 38.23 kg/m².  Physical Exam    ENT:      Head: Normocephalic and atraumatic.      Right Ear: Tympanic membrane normal.      Left Ear: Tympanic membrane normal.      Nose: Nose normal.      Mouth/Throat:      Mouth: Mucous membranes are moist.   Eyes:      Pupils: Pupils are equal, round, and reactive to light.   Cardiovascular:      Rate and Rhythm: Normal rate and regular rhythm.      Pulses: Normal pulses.      Heart sounds: Normal heart sounds.   Pulmonary:      Effort: Pulmonary effort is normal.      Breath sounds: Normal breath sounds.   Abdominal:      General: Abdomen is flat. Bowel sounds are normal.      Palpations: Abdomen is soft.   Musculoskeletal:         General: Normal range of motion.      Cervical back: Normal range of motion and neck supple.   Skin:     General: Skin is warm and dry.      Capillary Refill: Capillary refill takes less than 2 seconds.   Neurological:      General: No focal deficit present.      Mental Status: She is alert and oriented to person, place, and time.   Psychiatric:         Mood and Affect: Mood normal.       Active Problem List  Patient Active Problem List   Diagnosis    Dyslipidemia    Effusion of left ankle    Elevated blood pressure reading    Fatigue    Hypomagnesemia    Left wrist tendinitis    Menorrhagia    Myopia of both eyes    Hyperactivity of bladder    Overweight    Posterior tibial tendinitis of left leg    Primary osteoarthritis of both knees    Vitamin B12 deficiency    Vitamin D deficiency    Visit for screening mammogram    Prepatellar bursitis of both knees    Routine general medical examination at a health care facility    Overactive bladder    Need for immunization against influenza    Acute non-recurrent maxillary sinusitis    Unilateral primary " osteoarthritis, left knee    Total knee replacement status, left    Unilateral primary osteoarthritis, right knee    Immunity status testing    Screening-pulmonary TB    Screening examination for pulmonary tuberculosis    Screening for viral disease    Primary hypertension    Class 2 severe obesity due to excess calories with serious comorbidity and body mass index (BMI) of 38.0 to 38.9 in adult       Comprehensive Medical/Surgical/Social/Family History  Past Medical History:   Diagnosis Date    Acute frontal sinusitis 06/19/2023    Acute upper respiratory infection 06/19/2023    Ankle pain 06/19/2023    Chest pain 06/19/2023    Delayed emergence from general anesthesia     Hyperlipidemia     Hypertension     Nausea 06/19/2023    Other conditions influencing health status     Cervical Dysplasia    Pain, wrist joint 06/19/2023    Pain, wrist joint 06/19/2023    PONV (postoperative nausea and vomiting)      Past Surgical History:   Procedure Laterality Date    ANKLE SURGERY  05/10/2013    Ankle Surgery    OTHER SURGICAL HISTORY  05/10/2013    Cervical Conization Loop Electrode Excision    OTHER SURGICAL HISTORY  05/10/2013    Ovarian Cystectomy     Social History     Social History Narrative    Not on file       Allergies and Medications  Patient has no known allergies.  Current Outpatient Medications   Medication Instructions    apixaban (Eliquis) 5 mg (74 tabs) tablet Take 2 tablets (10 mg) by mouth 2 times a day for 7 days, then take 1 tablet (5 mg) by mouth 2 times a day.    atorvastatin (LIPITOR) 10 mg, oral, Daily    cyclobenzaprine (FLEXERIL) 10 mg, oral, 3 times daily PRN    drospirenone, contraceptive, 4 mg (28) tablet 1 tablet, oral, Daily    methylPREDNISolone (Medrol Dospak) 4 mg tablets Use as directed by package instructions    valsartan (DIOVAN) 80 mg, oral, Daily

## 2025-03-17 ENCOUNTER — LAB (OUTPATIENT)
Dept: LAB | Facility: HOSPITAL | Age: 45
End: 2025-03-17
Payer: COMMERCIAL

## 2025-03-17 ENCOUNTER — TREATMENT (OUTPATIENT)
Dept: PHYSICAL THERAPY | Facility: CLINIC | Age: 45
End: 2025-03-17
Payer: COMMERCIAL

## 2025-03-17 ENCOUNTER — PRE-ADMISSION TESTING (OUTPATIENT)
Dept: PREADMISSION TESTING | Facility: HOSPITAL | Age: 45
End: 2025-03-17
Payer: COMMERCIAL

## 2025-03-17 VITALS
RESPIRATION RATE: 16 BRPM | WEIGHT: 206.35 LBS | TEMPERATURE: 97.5 F | DIASTOLIC BLOOD PRESSURE: 75 MMHG | OXYGEN SATURATION: 98 % | BODY MASS INDEX: 37.97 KG/M2 | SYSTOLIC BLOOD PRESSURE: 141 MMHG | HEART RATE: 66 BPM | HEIGHT: 62 IN

## 2025-03-17 DIAGNOSIS — Z01.818 PREOP EXAMINATION: Primary | ICD-10-CM

## 2025-03-17 DIAGNOSIS — Z96.652 AFTERCARE FOLLOWING LEFT KNEE JOINT REPLACEMENT SURGERY: ICD-10-CM

## 2025-03-17 DIAGNOSIS — M17.11 UNILATERAL PRIMARY OSTEOARTHRITIS, RIGHT KNEE: Primary | ICD-10-CM

## 2025-03-17 DIAGNOSIS — M17.11 UNILATERAL PRIMARY OSTEOARTHRITIS, RIGHT KNEE: ICD-10-CM

## 2025-03-17 DIAGNOSIS — M17.12 PRIMARY OSTEOARTHRITIS OF LEFT KNEE: Primary | ICD-10-CM

## 2025-03-17 DIAGNOSIS — Z47.1 AFTERCARE FOLLOWING LEFT KNEE JOINT REPLACEMENT SURGERY: ICD-10-CM

## 2025-03-17 LAB
ALBUMIN SERPL BCP-MCNC: 4.5 G/DL (ref 3.4–5)
ALP SERPL-CCNC: 64 U/L (ref 33–110)
ALT SERPL W P-5'-P-CCNC: 15 U/L (ref 7–45)
ANION GAP SERPL CALC-SCNC: 14 MMOL/L (ref 10–20)
APTT PPP: 29 SECONDS (ref 26–36)
AST SERPL W P-5'-P-CCNC: 10 U/L (ref 9–39)
BASOPHILS # BLD AUTO: 0.05 X10*3/UL (ref 0–0.1)
BASOPHILS NFR BLD AUTO: 0.4 %
BILIRUB SERPL-MCNC: 0.3 MG/DL (ref 0–1.2)
BUN SERPL-MCNC: 13 MG/DL (ref 6–23)
CALCIUM SERPL-MCNC: 9.7 MG/DL (ref 8.6–10.3)
CHLORIDE SERPL-SCNC: 102 MMOL/L (ref 98–107)
CO2 SERPL-SCNC: 26 MMOL/L (ref 21–32)
CREAT SERPL-MCNC: 0.71 MG/DL (ref 0.5–1.05)
EGFRCR SERPLBLD CKD-EPI 2021: >90 ML/MIN/1.73M*2
EOSINOPHIL # BLD AUTO: 0.02 X10*3/UL (ref 0–0.7)
EOSINOPHIL NFR BLD AUTO: 0.2 %
ERYTHROCYTE [DISTWIDTH] IN BLOOD BY AUTOMATED COUNT: 13.4 % (ref 11.5–14.5)
GLUCOSE SERPL-MCNC: 93 MG/DL (ref 74–99)
HCT VFR BLD AUTO: 41.3 % (ref 36–46)
HGB BLD-MCNC: 13.5 G/DL (ref 12–16)
HOLD SPECIMEN: NORMAL
IMM GRANULOCYTES # BLD AUTO: 0.05 X10*3/UL (ref 0–0.7)
IMM GRANULOCYTES NFR BLD AUTO: 0.4 % (ref 0–0.9)
INR PPP: 1 (ref 0.9–1.1)
LYMPHOCYTES # BLD AUTO: 2.72 X10*3/UL (ref 1.2–4.8)
LYMPHOCYTES NFR BLD AUTO: 22.4 %
M TB IFN-G BLD-IMP: NORMAL
MCH RBC QN AUTO: 30.2 PG (ref 26–34)
MCHC RBC AUTO-ENTMCNC: 32.7 G/DL (ref 32–36)
MCV RBC AUTO: 92 FL (ref 80–100)
MONOCYTES # BLD AUTO: 0.57 X10*3/UL (ref 0.1–1)
MONOCYTES NFR BLD AUTO: 4.7 %
NEUTROPHILS # BLD AUTO: 8.71 X10*3/UL (ref 1.2–7.7)
NEUTROPHILS NFR BLD AUTO: 71.9 %
NRBC BLD-RTO: 0 /100 WBCS (ref 0–0)
PLATELET # BLD AUTO: 353 X10*3/UL (ref 150–450)
POTASSIUM SERPL-SCNC: 4 MMOL/L (ref 3.5–5.3)
PROT SERPL-MCNC: 7.6 G/DL (ref 6.4–8.2)
PROTHROMBIN TIME: 11.4 SECONDS (ref 9.8–12.4)
RBC # BLD AUTO: 4.47 X10*6/UL (ref 4–5.2)
SODIUM SERPL-SCNC: 138 MMOL/L (ref 136–145)
WBC # BLD AUTO: 12.1 X10*3/UL (ref 4.4–11.3)

## 2025-03-17 PROCEDURE — 80053 COMPREHEN METABOLIC PANEL: CPT

## 2025-03-17 PROCEDURE — 85025 COMPLETE CBC W/AUTO DIFF WBC: CPT

## 2025-03-17 PROCEDURE — 99202 OFFICE O/P NEW SF 15 MIN: CPT

## 2025-03-17 PROCEDURE — 85610 PROTHROMBIN TIME: CPT

## 2025-03-17 PROCEDURE — 83036 HEMOGLOBIN GLYCOSYLATED A1C: CPT

## 2025-03-17 PROCEDURE — 87081 CULTURE SCREEN ONLY: CPT | Mod: STJLAB | Performed by: ORTHOPAEDIC SURGERY

## 2025-03-17 PROCEDURE — 85730 THROMBOPLASTIN TIME PARTIAL: CPT

## 2025-03-17 PROCEDURE — 97110 THERAPEUTIC EXERCISES: CPT | Mod: GP

## 2025-03-17 ASSESSMENT — DUKE ACTIVITY SCORE INDEX (DASI)
CAN YOU CLIMB A FLIGHT OF STAIRS OR WALK UP A HILL: YES
CAN YOU TAKE CARE OF YOURSELF (EAT, DRESS, BATHE, OR USE TOILET): YES
CAN YOU DO HEAVY WORK AROUND THE HOUSE LIKE SCRUBBING FLOORS OR LIFTING AND MOVING HEAVY FURNITURE: NO
CAN YOU DO LIGHT WORK AROUND THE HOUSE LIKE DUSTING OR WASHING DISHES: YES
CAN YOU DO MODERATE WORK AROUND THE HOUSE LIKE VACUUMING, SWEEPING FLOORS OR CARRYING GROCERIES: YES
CAN YOU RUN A SHORT DISTANCE: NO
TOTAL_SCORE: 24.2
CAN YOU WALK A BLOCK OR TWO ON LEVEL GROUND: YES
CAN YOU DO YARD WORK LIKE RAKING LEAVES, WEEDING OR PUSHING A MOWER: NO
CAN YOU PARTICIPATE IN MODERATE RECREATIONAL ACTIVITIES LIKE GOLF, BOWLING, DANCING, DOUBLES TENNIS OR THROWING A BASEBALL OR FOOTBALL: NO
DASI METS SCORE: 5.7
CAN YOU PARTICIPATE IN STRENOUS SPORTS LIKE SWIMMING, SINGLES TENNIS, FOOTBALL, BASKETBALL, OR SKIING: NO
CAN YOU HAVE SEXUAL RELATIONS: YES
CAN YOU WALK INDOORS, SUCH AS AROUND YOUR HOUSE: YES

## 2025-03-17 ASSESSMENT — PAIN SCALES - GENERAL: PAINLEVEL_OUTOF10: 0 - NO PAIN

## 2025-03-17 ASSESSMENT — LIFESTYLE VARIABLES: SMOKING_STATUS: NONSMOKER

## 2025-03-17 ASSESSMENT — ACTIVITIES OF DAILY LIVING (ADL): ADL_SCORE: 1

## 2025-03-17 ASSESSMENT — PAIN - FUNCTIONAL ASSESSMENT: PAIN_FUNCTIONAL_ASSESSMENT: 0-10

## 2025-03-17 NOTE — PREPROCEDURE INSTRUCTIONS
Thank you for visiting Preadmission Testing at [facility]. If you have any changes to your health condition, please call the SURGEON's office to alert them and give them details of your symptoms.  STOP all NSAIDS (Ibuprofen, Motrin, Aleve), vitamins, herbals, supplements, and all over the counter medications for 7 days prior to surgery  Tylenol is okay to take up until the morning of surgery for pain or headache if needed         Preoperative Brain Exercises    What are brain exercises?  A brain exercise is any activity that engages your thinking (cognitive) skills.    What types of activities are considered brain exercises?  Jigsaw puzzles, crossword puzzles, word jumble, memory games, word search, and many more.  Many can be found free online or on your phone via a mobile geeta.    Why should I do brain exercises before my surgery?  More recent research has shown brain exercise before surgery can lower the risk of postoperative delirium (confusion) which can be especially important for older adults.  Patients who did brain exercises for 5 to 10 hours the days before surgery, cut their risk of postoperative delirium in half up to 1 week after surgery.      Preoperative Deep Breathing Exercises    Why it is important to do deep breathing exercises before my surgery?  Deep breathing exercises strengthen your breathing muscles.  This helps you to recover after your surgery and decreases the chance of breathing complications.    How are the deep breathing exercises done?  Sit straight with your back supported.  Breathe in deeply and slowly through your nose. Your lower rib cage should expand and your abdomen may move forward.  Hold that breath for 3 to 5 seconds.  Breathe out through pursed lips, slowly and completely.  Rest and repeat 10 times every hour while awake.  Rest longer if you become dizzy or lightheaded.      Patient and Family Education   Ways You Can Help Prevent Blood Clots     This handout explains some simple  things you can do to help prevent blood clots.      Blood clots are blockages that can form in the body's veins. When a blood clot forms in your deep veins, it may be called a deep vein thrombosis, or DVT for short. Blood clots can happen in any part of the body where blood flows, but they are most common in the arms and legs. If a piece of a blood clot breaks free and travels to the lungs, it is called a pulmonary embolus (PE). A PE can be a very serious problem.      Being in the hospital or having surgery can raise your chances of getting a blood clot because you may not be well enough to move around as much as you normally do.      Ways you can help prevent blood clots in the hospital         Wearing SCDs. SCDs stands for Sequential Compression Devices.   SCDs are special sleeves that wrap around your legs  They attach to a pump that fills them with air to gently squeeze your legs every few minutes.   This helps return the blood in your legs to your heart.   SCDs should only be taken off when walking or bathing.   SCDs may not be comfortable, but they can help save your life.               Wearing compression stockings - if your doctor orders them. These special snug fitting stockings gently squeeze your legs to help blood flow.       Walking. Walking helps move the blood in your legs.   If your doctor says it is ok, try walking the halls at least   5 times a day. Ask us to help you get up, so you don't fall.      Taking any blood thinning medicines your doctor orders.          ©[facility]; 3/23        Ways you can help prevent blood clots at home       Wearing compression stockings - if your doctor orders them. ? Walking - to help move the blood in your legs.       Taking any blood thinning medicines your doctor orders.      Signs of a blood clot or PE      Tell your doctor or nurse know right away if you have of the problems listed below.    If you are at home, seek medical care right away. Call [phone]  for chest pain or problems breathing.          Signs of a blood clot (DVT) - such as pain,  swelling, redness or warmth in your arm or leg      Signs of a pulmonary embolism (PE) - such as chest     pain or feeling short of breath

## 2025-03-17 NOTE — PROGRESS NOTES
Physical Therapy Treatment Note     Patient Name: Keiry Kline  MRN: [id number]  Today's Date: 3/17/2025     Time Calculation  Start Time: 1445  Stop Time: 1530  Time Calculation (min): 45 min  PT Therapeutic Procedures Time Entry  Therapeutic Exercise Time Entry: 45    Visit: Anna/[place]  Referred by:  Carlitos Gallagher MD, Referring Provider     Insurance:   [employer] [place] [facility]   Certification Dates:  2-19-25 to 3-27-25  Current Problem:   Problem List Items Addressed This Visit    None  Visit Diagnoses         Codes    Primary osteoarthritis of left knee    -  Primary M17.12    Aftercare following left knee joint replacement surgery     Z47.1, Z96.652          1. Primary osteoarthritis of left knee  Follow Up In Physical Therapy      2. Aftercare following left knee joint replacement surgery  Follow Up In Physical Therapy               Surgery:  Lt TKR, 1-17-25  HPI:  Degenerative, started when she was sprinting a 50 m dash in 2023, then orthopedic issues started.       Subjective:  Pt stated she was given a Medrol dose pack last week for swelling.  She hasn't noticed a significant difference, has 4 days left.  She will need to come off all medication for her upcoming Rt TKR on 3-28-25.         Patient's Living Environment/PLOF: Independent with all ADL's prior to current injury, lives with spouse who is a olivera.  She works as a  for [employer].  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      Home Living: No concerns, lives in split level, has double railing on all stairs. One step into house, has railing to use  Patient's Therapy Goals: To decrease pain, return to PLOF     Pain:  Intensity:  5/10, surgical             Location: Lt knee, diffuse, popliteal fossa            Duration:  intermittent            Aggravating Factor: prolonged WB, positional, end ranges            Alleviating Factor:  Tramadol, Tylenol, rest            Clinical Progression:  Gradually improving     Social Factors:  1 personal factor (she carried the insurance, when she is in clinicals x 1 yr, worried about coverage) &/1-2 comorbidities (HTN, OA Rt knee)     Precautions:  WBAT     Objective     Lt Knee AROM: -2 to 105/107 PROM with pain at end ranges    Swelling/Girth:  per visual inspection, mild through Lt knee    HEP Compliance:  100%  Transfers:  + use of hands to push up from low surfaces  Gait:  Independent w/o assistive device no gait deficits  Stairs:  Can do reciprocal stairclimbing for ascending w/ 1 rail, descending is a step to pattern, mostly due to Rt knee now  Balance:  Lt impaired, 10 sec prior to stepping strategy or grabbing onto external support.    Outcome Measures:  LEFS: 30/80, 37.5%  Follow Up with MD:  3-14-25    Treatments:     Access Code: [email or web address]  URL: [email or web address]  Date: 02/19/2025  Prepared by: Berta Huff-Cherelle    Exercises    - Seated LAQ,  3 x 10, 1.5 lbs  - Prone Knee Ext, 3 Min, 1 lb  - Seated Hamstring Curl, green, 2 x 10 reps  - Mini Squats,   - Bike 5 Min, Level 1    Rockerboard, A/P, 20 x    Calf Stretch, 3 x 30 sec hold    Heel slides, 10 x 5 sec hold    Prone Quad Stretch, 3 x 30 sec hold      Therapeutic Activity        4 inch Step Ups, 2 x 10, with UE support    4 inch Up & Overs, 10 x 2    2 inch Tap Downs, 2 x 10    Plyothrow, Red 10 x    Total Gym Squats, 2 x 15, B    Assessment:     Pt stated the Rt knee has been bothering her more than the Lt knee.  The surgeon will perform QUYEN on the Lt next week during Rt TKR.  Rt TKR scheduled for 3-28-25.  She was able to do plyo throw for 15 throws prior to engaging a stepping strategy.  She is progressing well to date.     Plan: Continue per POC & pt tolerance.  Frequency/duration: 2x/wk for 12 visits

## 2025-03-17 NOTE — PREPROCEDURE INSTRUCTIONS
Medication List            Accurate as of March 17, 2025 10:43 AM. Always use your most recent med list.                acetaminophen 325 mg tablet  Commonly known as: Tylenol  Medication Adjustments for Surgery: Take/Use as prescribed     apixaban 5 mg (74 tabs) tablet  Commonly known as: Eliquis  Take 2 tablets (10 mg) by mouth 2 times a day for 7 days, then take 1 tablet (5 mg) by mouth 2 times a day.  Additional Medication Adjustments for Surgery: Other (Comment)  Notes to patient: Not taking      aspirin-acetaminophen-caffeine 250-250-65 mg tablet  Commonly known as: Excedrin Migraine  Additional Medication Adjustments for Surgery: Take last dose 7 days before surgery     atorvastatin 10 mg tablet  Commonly known as: Lipitor  Take 1 tablet (10 mg) by mouth once daily.  Medication Adjustments for Surgery: Take/Use as prescribed     cyclobenzaprine 10 mg tablet  Commonly known as: Flexeril  Take 1 tablet (10 mg) by mouth 3 times a day as needed for muscle spasms for up to 7 days.  Medication Adjustments for Surgery: Take/Use as prescribed     drospirenone (contraceptive) 4 mg (28) tablet  Take 1 tablet by mouth once daily.  Additional Medication Adjustments for Surgery: Other (Comment)  Notes to patient: Coordinate with prescribing provider for further instructions on this medications prior to surgery.     methylPREDNISolone 4 mg tablets  Commonly known as: Medrol Dospak  Use as directed by package instructions  Medication Adjustments for Surgery: Take/Use as prescribed     solifenacin 10 mg tablet  Commonly known as: VESIcare  Medication Adjustments for Surgery: Do Not take on the morning of surgery     traMADol 50 mg tablet  Commonly known as: Ultram  Medication Adjustments for Surgery: Take/Use as prescribed     valsartan 80 mg tablet  Commonly known as: Diovan  Take 1 tablet (80 mg) by mouth once daily.  Additional Medication Adjustments for Surgery: Other (Comment)  Notes to patient: HOLD any evening dose  the night before the day of surgery  HOLD the day of surgery                          PRE-OPERATIVE INSTRUCTIONS    You will receive notification one business day prior to your procedure to confirm your arrival time. It is important that you answer your phone and/or check your messages during this time. If you do not hear from the surgery center by 5 pm. the day before your procedure, please call [phone].     Please enter the building through the Outpatient entrance and take the elevator off the lobby to the 2nd floor then check in at the Outpatient Surgery desk on the 2nd floor.    INSTRUCTIONS:  Talk to your surgeon for instructions if you should stop your aspirin, blood thinner, or diabetes medicines.  DO NOT take any multivitamins or over the counter supplements for 7-10 days before surgery.  If not being admitted, you must have an adult immediately available to drive you home after surgery. We also highly recommend you have someone stay with you for the entire day and night of your surgery.  For children having surgery, a parent or legal guardian must accompany them to the surgery center. If this is not possible, please call [phone] to make additional arrangements.  For adults who are unable to consent or make medical decisions for themselves, a legal guardian or Power of  must accompany them to the surgery center. If this is not possible, please call [phone] to make additional arrangements.  Wear comfortable, loose fitting clothing.  All jewelry and piercings must be removed. If you are unable to remove an item or have a dermal piercing, please be sure to tell the nurse when you arrive for surgery.  Nail polish and make-up must be removed.  Avoid smoking or consuming alcohol for 24 hours before surgery.  To help prevent infection, please take a shower/bath and wash your hair the night before and/or morning of surgery (or follow other specific bathing instructions  provided).    Preoperative Fasting Guidelines    Why must I stop eating and drinking near surgery time?  With sedation, food or liquid in your stomach can enter your lungs causing serious complications  Increases nausea and vomiting    When do I need to stop eating and drinking before my surgery?  Do not eat any solid food after midnight the night before your surgery/procedure unless otherwise instructed by your surgeon.   You may have up to 13.5 ounces of clear liquid until TWO hours before your instructed arrival time to the hospital.  This includes water, black tea/coffee, (no milk or cream) apple juice, and electrolyte drinks (Gatorade).   You may chew gum until TWO hours before your surgery/procedure      If applicable, notify your surgeons office immediately of any new skin changes that occur to the surgical limb.      If you have any questions or concerns, please call Pre-Admission Testing at [phone].

## 2025-03-17 NOTE — CPM/PAT H&P
CPM/PAT Evaluation       Name: Keiry Kline (Keiry Kline)  /Age: 1980/45 y.o.     In-Person       Chief Complaint: Right knee pain     HPI  Pleasant 46 y/o female presents with unilateral primary osteoarthritis, right knee scheduled for right knee arthroplasty on 3/28/25. She recently had her left knee replaced. States surgery went well, however her right knee has been more painful since. Endorses pain, limited range of motion, limited physical activity. Currently using a cane for ambulation. Denies recent fever/illness/chills.     Past Medical History:   Diagnosis Date    Acute frontal sinusitis 2023    Acute upper respiratory infection 2023    Ankle pain 2023    Chest pain 2023    Delayed emergence from general anesthesia     Hyperlipidemia     Hypertension     Nausea 2023    Other conditions influencing health status     Cervical Dysplasia    Pain, wrist joint 2023    Pain, wrist joint 2023    PONV (postoperative nausea and vomiting)        Past Surgical History:   Procedure Laterality Date    ANKLE SURGERY  05/10/2013    Ankle Surgery    JOINT REPLACEMENT      KNEE ARTHROPLASTY      OTHER SURGICAL HISTORY  05/10/2013    Cervical Conization Loop Electrode Excision    OTHER SURGICAL HISTORY  05/10/2013    Ovarian Cystectomy       Patient  reports being sexually active.    Family History   Problem Relation Name Age of Onset    Diabetes Mother      Hypertension Father      Hypertension Sister         No Known Allergies    Prior to Admission medications    Medication Sig Start Date End Date Taking? Authorizing Provider   acetaminophen (Tylenol) 325 mg tablet Take 2 tablets (650 mg) by mouth every 4 hours if needed for mild pain (1 - 3) or moderate pain (4 - 6).    Historical Provider, MD   apixaban (Eliquis) 5 mg (74 tabs) tablet Take 2 tablets (10 mg) by mouth 2 times a day for 7 days, then take 1 tablet (5 mg) by mouth 2 times a day.  Patient not taking:  Reported on 3/16/2025 1/18/25 2/18/27  JESÚS Buckner-CNP   aspirin-acetaminophen-caffeine (Excedrin Migraine) 250-250-65 mg tablet Take 1 tablet by mouth every 6 hours if needed for headaches.    Historical Provider, MD   atorvastatin (Lipitor) 10 mg tablet Take 1 tablet (10 mg) by mouth once daily. 3/16/25   Eusebio Aceves MD   cyclobenzaprine (Flexeril) 10 mg tablet Take 1 tablet (10 mg) by mouth 3 times a day as needed for muscle spasms for up to 7 days.  Patient not taking: Reported on 3/16/2025 1/18/25 1/25/25  JESÚS Buckner-CNP   drospirenone, contraceptive, 4 mg (28) tablet Take 1 tablet by mouth once daily. 2/9/24 3/16/25  Cherelle Oneil MD   methylPREDNISolone (Medrol Dospak) 4 mg tablets Use as directed by package instructions 3/14/25   Carlitos Gallagher MD   solifenacin (VESIcare) 10 mg tablet Take 1 tablet (10 mg) by mouth once daily. Swallow tablet whole; do not crush, chew, or split.    Historical Provider, MD   traMADol (Ultram) 50 mg tablet Take 1 tablet (50 mg) by mouth every 6 hours if needed for severe pain (7 - 10).    Historical Provider, MD   valsartan (Diovan) 80 mg tablet Take 1 tablet (80 mg) by mouth once daily. 3/16/25   Eusebio Aceves MD   atorvastatin (Lipitor) 10 mg tablet TAKE 1 TABLET(10 MG) BY MOUTH DAILY 2/17/25 3/16/25  Eusebio Aceves MD   solifenacin (VESIcare) 10 mg tablet TAKE 1 TABLET(10 MG) BY MOUTH DAILY. SWALLOW TABLET WHOLE. DO NOT CRUSH, CHEW, OR SPLIT  Patient not taking: Reported on 3/10/2025 2/17/25 3/16/25  Eusebio Aceves MD   valsartan (Diovan) 80 mg tablet TAKE 1 TABLET(80 MG) BY MOUTH EVERY DAY 2/10/25 3/16/25  Eusebio Aceves MD        Constitutional: Negative for fever, chills, or sweats   ENMT: Negative for nasal discharge, congestion, ear pain, mouth pain, throat pain. Positive for contacts.   Respiratory: Negative for cough, wheezing, shortness of breath   Cardiac: Negative for chest pain, dyspnea on exertion, palpitations   Gastrointestinal: Negative for  "nausea, vomiting, diarrhea, constipation, abdominal pain  Genitourinary: Negative for dysuria, flank pain, frequency, hematuria. Positive for OAB.    Musculoskeletal: Negative for decreased ROM, pain, swelling, weakness. See HPI.  Neurological: Negative for dizziness, confusion, headache  Psychiatric: Negative for mood changes   Skin: Negative for itching, rash, ulcer    Hematologic/Lymph: Negative for bruising, easy bleeding  Allergic/Immunologic: Negative itching, sneezing, swelling      Physical Exam  Vitals reviewed.   Constitutional:       Appearance: Normal appearance.   HENT:      Head: Normocephalic.      Mouth/Throat:      Mouth: Mucous membranes are moist.      Pharynx: Oropharynx is clear.   Eyes:      Pupils: Pupils are equal, round, and reactive to light.   Cardiovascular:      Rate and Rhythm: Normal rate and regular rhythm.      Heart sounds: Normal heart sounds.   Pulmonary:      Effort: Pulmonary effort is normal.      Breath sounds: Normal breath sounds.   Abdominal:      General: Bowel sounds are normal.      Palpations: Abdomen is soft.   Musculoskeletal:         General: Normal range of motion.      Cervical back: Normal range of motion.   Skin:     General: Skin is warm and dry.   Neurological:      General: No focal deficit present.      Mental Status: She is alert and oriented to person, place, and time.   Psychiatric:         Mood and Affect: Mood normal.         Behavior: Behavior normal.          PAT AIRWAY:   Airway:     Mallampati::  II    Neck ROM::  Full  normal        Testing/Diagnostic:     Patient Specialist/PCP:   PCP: Dr. Aceves     Visit Vitals  /75   Pulse 66   Temp 36.4 °C (97.5 °F) (Temporal)   Resp 16   Ht 1.575 m (5' 2\")   Wt 93.6 kg (206 lb 5.6 oz)   SpO2 98%   BMI 37.74 kg/m²   OB Status Having periods   Smoking Status Never   BSA 2.02 m²       DASI Risk Score      Flowsheet Row Pre-Admission Testing from 3/17/2025 in [facility] Pre-Admission Testing " from 1/14/2025 in [facility]   Can you take care of yourself (eat, dress, bathe, or use toilet)?  2.75 filed at 03/17/2025 1040 2.75 filed at 01/13/2025 1343   Can you walk indoors, such as around your house? 1.75 filed at 03/17/2025 1040 1.75 filed at 01/13/2025 1343   Can you walk a block or two on level ground?  2.75 filed at 03/17/2025 1040 2.75 filed at 01/13/2025 1343   Can you climb a flight of stairs or walk up a hill? 5.5 filed at 03/17/2025 1040 5.5 filed at 01/13/2025 1343   Can you run a short distance? 0 filed at 03/17/2025 1040 0 filed at 01/13/2025 1343   Can you do light work around the house like dusting or washing dishes? 2.7 filed at 03/17/2025 1040 2.7 filed at 01/13/2025 1343   Can you do moderate work around the house like vacuuming, sweeping floors or carrying groceries? 3.5 filed at 03/17/2025 1040 3.5 filed at 01/13/2025 1343   Can you do heavy work around the house like scrubbing floors or lifting and moving heavy furniture?  0 filed at 03/17/2025 1040 0 filed at 01/13/2025 1343   Can you do yard work like raking leaves, weeding or pushing a mower? 0 filed at 03/17/2025 1040 4.5 filed at 01/13/2025 1343   Can you have sexual relations? 5.25 filed at 03/17/2025 1040 5.25 filed at 01/13/2025 1343   Can you participate in moderate recreational activities like golf, bowling, dancing, doubles tennis or throwing a baseball or football? 0 filed at 03/17/2025 1040 0 filed at 01/13/2025 1343   Can you participate in strenous sports like swimming, singles tennis, football, basketball, or skiing? 0 filed at 03/17/2025 1040 0 filed at 01/13/2025 1343   DASI SCORE 24.2 filed at 03/17/2025 1040 28.7 filed at 01/13/2025 1343   METS Score (Will be calculated only when all the questions are answered) 5.7 filed at 03/17/2025 1040 6.3 filed at 01/13/2025 1343          Caprini DVT Assessment      Flowsheet Row Pre-Admission Testing from 3/17/2025 in [facility] Admission  (Discharged) from 1/17/2025 in [facility] 4 Observation with Carlitos Gallagher MD   DVT Score (IF A SCORE IS NOT CALCULATING, MUST SELECT A BMI TO COMPLETE) 10 filed at 03/17/2025 1037 9 filed at 01/17/2025 0849   Surgical Factors Elective major lower extremity arthroplasty filed at 03/17/2025 1037 Elective major lower extremity arthroplasty filed at 01/17/2025 0849   Women Oral contraceptives filed at 03/17/2025 1037 --   BMI (BMI MUST BE CHOSEN) 31-40 (Obesity) filed at 03/17/2025 1037 31-40 (Obesity) filed at 01/17/2025 0849          Modified Frailty Index      Flowsheet Row Pre-Admission Testing from 3/17/2025 in [facility] Pre-Admission Testing from 1/14/2025 in [facility]   Non-independent functional status (problems with dressing, bathing, personal grooming, or cooking) 0 filed at 03/17/2025 1041 0 filed at 01/13/2025 1343   History of diabetes mellitus  0 filed at 03/17/2025 1041 0 filed at 01/13/2025 1343   History of COPD 0 filed at 03/17/2025 1041 0 filed at 01/13/2025 1343   History of CHF No filed at 03/17/2025 1041 No filed at 01/13/2025 1343   History of MI 0 filed at 03/17/2025 1041 0 filed at 01/13/2025 1343   History of Percutaneous Coronary Intervention, Cardiac Surgery, or Angina No filed at 03/17/2025 1041 No filed at 01/13/2025 1343   Hypertension requiring the use of medication  0.0909 filed at 03/17/2025 1041 0.0909 filed at 01/13/2025 1343   Peripheral vascular disease 0 filed at 03/17/2025 1041 0 filed at 01/13/2025 1343   Impaired sensorium (cognitive impairement or loss, clouding, or delirium) 0 filed at 03/17/2025 1041 0 filed at 01/13/2025 1343   TIA or CVA withouy residual deficit 0 filed at 03/17/2025 1041 0 filed at 01/13/2025 1343   Cerebrovascular accident with deficit 0 filed at 03/17/2025 1041 0 filed at 01/13/2025 1343   Modified Frailty Index Calculator .0909 filed at 03/17/2025 1041 .0909 filed at 01/13/2025 1343           [id number] Stroke Risk Points  Current as of just now        N/A 0 to 9 Points:      Last Change: N/A          The [id number] risk score (Lip ABDI, et al. 2009. © 2010 American College [employer]) quantifies the risk of stroke for a patient with atrial fibrillation. For patients without atrial fibrillation or under the age of 18 this score appears as N/A. Higher score values generally indicate higher risk of stroke.        This score is not applicable to this patient. Components are not calculated.          Revised Cardiac Risk Index      Flowsheet Row Pre-Admission Testing from 3/17/2025 in [facility] Pre-Admission Testing from 1/14/2025 in [facility]   High-Risk Surgery (Intraperitoneal, Intrathoracic,Suprainguinal vascular) 0 filed at 03/17/2025 1041 0 filed at 01/13/2025 1343   History of ischemic heart disease (History of MI, History of positive exercuse test, Current chest paint considered due to myocardial ischemia, Use of nitrate therapy, ECG with pathological Q Waves) 0 filed at 03/17/2025 1041 0 filed at 01/13/2025 1343   History of congestive heart failure (pulmonary edemia, bilateral rales or S3 gallop, Paroxysmal nocturnal dyspnea, CXR showing pulmonary vascular redistribution) 0 filed at 03/17/2025 1041 0 filed at 01/13/2025 1343   History of cerebrovascular disease (Prior TIA or stroke) 0 filed at 03/17/2025 1041 0 filed at 01/13/2025 1343   Pre-operative insulin treatment 0 filed at 03/17/2025 1041 0 filed at 01/13/2025 1343   Pre-operative creatinine>2 mg/dl 0 filed at 03/17/2025 1041 0 filed at 01/13/2025 1343   Revised Cardiac Risk Calculator 0 filed at 03/17/2025 1041 0 filed at 01/13/2025 1343          Apfel Simplified Score      Flowsheet Row Pre-Admission Testing from 3/17/2025 in [facility] Pre-Admission Testing from 1/14/2025 in [facility]   Smoking status 1 filed at 03/17/2025 1041 1 filed at 01/13/2025 1343    History of motion sickness or PONV  1 filed at 2025 1041 1 filed at 2025 1343   Use of postoperative opioids 1 filed at 2025 1041 0 filed at 2025 1343   Gender - Female 1=Yes filed at 2025 1041 1=Yes filed at 2025 1343   Apfel Simplified Score Calculator 4 filed at 2025 1041 3 filed at 2025 1343          Risk Analysis Index Results This Encounter         3/17/2025  1042             Do you live in a place other than your own home?: 0    When did you begin living in the place you are currently residing?: Greater than one year ago    Any kidney failure, kidney not working well, or seeing a kidney doctor (nephrologist)? If yes, was this for kidney stones or another problem?: 0 No    Any history of chronic (long-term) congestive heart failure (CHF)?: 0 No    Any shortness of breath when resting?: 0 No    In the past five years, have you been diagnosed with or treated for cancer?: No    During the last 3 months has it become difficult for you to remember things or organize your thoughts?: 0 No    Have you lost weight of 10 pounds or more in the past 3 months without trying?: 0 No    Do you have any loss of appetitie?: 0 No    Getting Around (Mobility): 1 Needs help from cane, walker, or scooter    Eatin Can plan and prepare own meals    Toiletin Can use toilet without any help    Personal Hygiene (Bathing, Hand Washing, Changing Clothes): 0 Can shower or bathe without any help    NOEL Cancer History: Patient does not indicate history of cancer    Total Risk Analysis Index Score Without Cancer: 13    Total Risk Analysis Index Score: 13          Stop Bang Score      Flowsheet Row Pre-Admission Testing from 3/17/2025 in [facility] Admission (Discharged) from 2025 in [facility] 4 Observation with Carlitos Gallagher MD   Do you snore loudly? 0 filed at 2025 1040 0 filed at 2025 0805   Do you often feel tired or fatigued after  your sleep? 0 filed at 03/17/2025 1040 0 filed at 01/17/2025 0805   Has anyone ever observed you stop breathing in your sleep? 0 filed at 03/17/2025 1040 0 filed at 01/17/2025 0805   Do you have or are you being treated for high blood pressure? 1 filed at 03/17/2025 1040 1 filed at 01/17/2025 0805   Recent BMI (Calculated) 37.7 filed at 03/17/2025 1040 37.5 filed at 01/17/2025 0805   Is BMI greater than 35 kg/m2? 1=Yes filed at 03/17/2025 1040 1=Yes filed at 01/17/2025 0805   Age older than 50 years old? 0=No filed at 03/17/2025 1040 0=No filed at 01/17/2025 0805   Is your neck circumference greater than 17 inches (Male) or 16 inches (Female)? 0 filed at 03/17/2025 1040 --   Gender - Male 0=No filed at 03/17/2025 1040 0=No filed at 01/17/2025 0805   STOP-BANG Total Score 2 filed at 03/17/2025 1040 --          Prodigy: High Risk  Total Score: 3              Prodigy Previous Opioid Use Score           ARISCAT Score for Postoperative Pulmonary Complications      Flowsheet Row Pre-Admission Testing from 3/17/2025 in [facility] Pre-Admission Testing from 1/14/2025 in [facility]   Age Calculated Score 0 filed at 03/17/2025 1042 0 filed at 01/13/2025 1345   Preoperative SpO2 0 filed at 03/17/2025 1042 0 filed at 01/13/2025 1345   Respiratory infection in the last month Either upper or lower (i.e., URI, bronchitis, pneumonia), with fever and antibiotic treatment 0 filed at 03/17/2025 1042 0 filed at 01/13/2025 1345   Preoperative anemia (Hgb less than 10 g/dl) 0 filed at 03/17/2025 1042 0 filed at 01/13/2025 1345   Surgical incision  0 filed at 03/17/2025 1042 0 filed at 01/13/2025 1345   Duration of surgery  16 filed at 03/17/2025 1042 23 filed at 01/13/2025 1345   Emergency Procedure  0 filed at 03/17/2025 1042 0 filed at 01/13/2025 1345   ARISCAT Total Score  16 filed at 03/17/2025 1042 23 filed at 01/13/2025 1345          James Perioperative Risk for Myocardial Infarction or Cardiac  Arrest (PARKER)      Flowsheet Row Pre-Admission Testing from 3/17/2025 in [facility] Pre-Admission Testing from 1/14/2025 in [facility]   Calculated Age Score 0.9 filed at 03/17/2025 1043 0.88 filed at 01/13/2025 1345   Functional Status  0 filed at 03/17/2025 1043 0 filed at 01/13/2025 1345   ASA Class  -3.29 filed at 03/17/2025 1043 -3.29 filed at 01/13/2025 1345   Creatinine 0 filed at 03/17/2025 1043 0 filed at 01/13/2025 1345   Type of Procedure  0.80 filed at 03/17/2025 1043 0.80 filed at 01/13/2025 1345   PARKER Total Score  -6.84 filed at 03/17/2025 1043 -6.86 filed at 01/13/2025 1345   APRKER % 0.11 filed at 03/17/2025 1043 0.1 filed at 01/13/2025 1345            Assessment and Plan:     Assessment and Plan:     Preop:   OR with Dr. Gallagher on 3/28/25 for right knee arthroplasty   Labs ordered per Dr. Gallagher  EKG on file from 1/14/25. NSR.      Neurologic:   The patient is at an increased risk for post operative delirium secondary to decrease functional status, polypharmacy , and type and duration of surgery . Preoperative brain exercise educational handout provided to patient.  The patient is at an increased risk for perioperative stroke secondary to HTN, HLD, and female sex .    Cardiac:  Hypertension: Controlled with medical management   Dyslipidemia: On atorvastatin   Duke Activity Status Index (DASI)  DASI Score: 24.2   MET Score: 5.7  RCRI  0 which is 3.9% 30 day risk of MACE (risk for cardiac death, nonfatal myocardial infarction, and nonfactal cardiac arrest)  PARKER score which indicates a  0.11 % risk of intraoperative or 30-day postoperative MACE    Pulmonary:   STOP-BANG score of  2 . Low  risk of obstructive sleep apnea.   ARISCAT:  16    points which is a low (1.6%) risk of in-hospital post-op pulmonary complications     GI:  Apfel: 4 points 79% risk for post operative N/V    /Renal:   OAB: Chronic. No recent UTI symptoms     Neuro-muscular:   Osteoarthritis: Reason for  upcoming surgery     Hematologic:   Caprini score 10, patient at high risk for perioperative DVT. Patient provided with VTE education/handout.     Skin check: Patient was instructed to make surgeon aware of any skin changes/concerns prior to surgery.     Anesthesia: Has had nausea/vomiting in the past with anesthesia. Has had slow emergence with anesthesia in the past.   ASA III-recent anesthesia-tolerated well. No acute medical changes since last anesthesia.     *See risk scores as previously documented

## 2025-03-18 LAB
EST. AVERAGE GLUCOSE BLD GHB EST-MCNC: 100 MG/DL
HBA1C MFR BLD: 5.1 %

## 2025-03-19 ENCOUNTER — TREATMENT (OUTPATIENT)
Dept: PHYSICAL THERAPY | Facility: CLINIC | Age: 45
End: 2025-03-19
Payer: COMMERCIAL

## 2025-03-19 DIAGNOSIS — Z11.1 SCREENING-PULMONARY TB: Primary | ICD-10-CM

## 2025-03-19 DIAGNOSIS — Z96.652 AFTERCARE FOLLOWING LEFT KNEE JOINT REPLACEMENT SURGERY: ICD-10-CM

## 2025-03-19 DIAGNOSIS — M17.12 PRIMARY OSTEOARTHRITIS OF LEFT KNEE: Primary | ICD-10-CM

## 2025-03-19 DIAGNOSIS — Z47.1 AFTERCARE FOLLOWING LEFT KNEE JOINT REPLACEMENT SURGERY: ICD-10-CM

## 2025-03-19 LAB — STAPHYLOCOCCUS SPEC CULT: ABNORMAL

## 2025-03-19 PROCEDURE — 97110 THERAPEUTIC EXERCISES: CPT | Mod: GP

## 2025-03-19 NOTE — PROGRESS NOTES
Physical Therapy Treatment Note     Patient Name: Keiry Kline  MRN: [id number]  Today's Date: 3/19/2025     Time Calculation  Start Time: 1300  Stop Time: 1345  Time Calculation (min): 45 min  PT Therapeutic Procedures Time Entry  Therapeutic Exercise Time Entry: 45    Visit: 6/MN  Referred by:  Carlitos Gallagher MD, Referring Provider     Insurance:   [employer] [facility]   Certification Dates:  2-19-25 to 3-27-25  Current Problem:   Problem List Items Addressed This Visit    None  Visit Diagnoses         Codes    Primary osteoarthritis of left knee    -  Primary M17.12    Aftercare following left knee joint replacement surgery     Z47.1, Z96.652          1. Primary osteoarthritis of left knee  Follow Up In Physical Therapy      2. Aftercare following left knee joint replacement surgery  Follow Up In Physical Therapy               Surgery:  Lt TKR, 1-17-25  HPI:  Degenerative, started when she was sprinting a 50 m dash in 2023, then orthopedic issues started.       Subjective:  Pt stated she is very sore today.  She's not sure why.  She will go off her meds this Friday in preparation for Rt TKR next Friday.         Patient's Living Environment/PLOF: Independent with all ADL's prior to current injury, lives with spouse who is a olivera.  She works as a  for [employer].  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      Home Living: No concerns, lives in split level, has double railing on all stairs. One step into house, has railing to use  Patient's Therapy Goals: To decrease pain, return to PLOF     Pain:  Intensity:  5/10, surgical             Location: Lt knee, diffuse, popliteal fossa            Duration:  intermittent            Aggravating Factor: prolonged WB, positional, end ranges            Alleviating Factor:  Tramadol, Tylenol, rest            Clinical Progression: Gradually improving     Social Factors:  1 personal factor (she carried the  insurance, when she is in clinicals x 1 yr, worried about coverage) &/1-2 comorbidities (HTN, OA Rt knee)     Precautions:  WBAT     Objective     Lt Knee AROM: -2 to 105/107 PROM with pain at end ranges, same as last visit    HEP Compliance:  100%  Transfers:  + use of hands to push up from low surfaces  Gait:  Independent w/o assistive device no gait deficits  Stairs:  Can do reciprocal stairclimbing for ascending w/ 1 rail, descending is a step to pattern, mostly due to Rt knee now  Balance:  Lt impaired, 10 sec prior to stepping strategy or grabbing onto external support.    Outcome Measures:  LEFS: 30/80, 37.5%  Follow Up with MD:  3-14-25    Treatments:     Access Code: [email or web address]    Exercises    - Seated LAQ,  3 x 10, 1.5 lbs  - Prone Knee Ext, 3 Min, 1 lb  - Seated Hamstring Curl, green, 2 x 10 reps  - Mini Squats,   - Bike 5 Min, Level 1    Rockerboard, A/P, 20 x    Calf Stretch, 3 x 30 sec hold    Heel slides, 10 x 5 sec hold    Prone Quad Stretch, 3 x 30 sec hold      Therapeutic Activity        4 inch Step Ups, 2 x 10, with UE support    4 inch Up & Overs, 10 x 2    2 inch Tap Downs, 2 x 10    Plyothrow, Red 10 x    Total Gym Squats, 2 x 15, B    Assessment:     Pt stated B knees have been bothering her more latelly.   ROM is slow to progress. QUYEN on the Lt next week during Rt TKR.  Rt TKR scheduled for 3-28-25.     Plan: Continue per POC & pt tolerance.  Frequency/duration: 2x/wk for 12 visits

## 2025-03-24 ENCOUNTER — CLINICAL SUPPORT (OUTPATIENT)
Dept: PRIMARY CARE | Facility: CLINIC | Age: 45
End: 2025-03-24
Payer: COMMERCIAL

## 2025-03-24 VITALS — WEIGHT: 206 LBS | HEIGHT: 62 IN | BODY MASS INDEX: 37.91 KG/M2

## 2025-03-24 DIAGNOSIS — Z11.1 SCREENING EXAMINATION FOR PULMONARY TUBERCULOSIS: ICD-10-CM

## 2025-03-26 ENCOUNTER — APPOINTMENT (OUTPATIENT)
Dept: PHYSICAL THERAPY | Facility: CLINIC | Age: 45
End: 2025-03-26

## 2025-03-27 LAB
IGNF NEG CNTRL BLD: NORMAL
M TB IFN-G BLD-IMP: NEGATIVE
MITOGEN IGNF.SPOT COUNT BLD: NORMAL
QUEST PANEL A SPOT COUNT: 0
QUEST PANEL B SPOT COUNT: 1

## 2025-03-27 RX ORDER — ACETAMINOPHEN 325 MG/1
650 TABLET ORAL ONCE
Status: CANCELLED | OUTPATIENT
Start: 2025-03-27 | End: 2025-03-27

## 2025-03-27 RX ORDER — CELECOXIB 50 MG/1
200 CAPSULE ORAL ONCE
Status: CANCELLED | OUTPATIENT
Start: 2025-03-27 | End: 2025-03-27

## 2025-03-27 RX ORDER — TRANEXAMIC ACID 650 MG/1
1950 TABLET ORAL ONCE
Status: CANCELLED | OUTPATIENT
Start: 2025-03-27 | End: 2025-03-27

## 2025-03-27 RX ORDER — CEFAZOLIN SODIUM 2 G/100ML
2 INJECTION, SOLUTION INTRAVENOUS ONCE
Status: CANCELLED | OUTPATIENT
Start: 2025-03-27 | End: 2025-03-27

## 2025-03-27 NOTE — H&P
History Of Present Illness  Keiry Kline is a 45 y.o. female presenting with right knee pain and DJD.     Past Medical History  She has a past medical history of Acute frontal sinusitis (06/19/2023), Acute upper respiratory infection (06/19/2023), Ankle pain (06/19/2023), Chest pain (06/19/2023), Delayed emergence from general anesthesia, Hyperlipidemia, Hypertension, Nausea (06/19/2023), Other conditions influencing health status, Pain, wrist joint (06/19/2023), Pain, wrist joint (06/19/2023), and PONV (postoperative nausea and vomiting).    Surgical History  She has a past surgical history that includes Other surgical history (05/10/2013); Ankle surgery (05/10/2013); Other surgical history (05/10/2013); Knee Arthroplasty; and Joint replacement.     Social History  She reports that she has never smoked. She has never used smokeless tobacco. She reports that she does not drink alcohol and does not use drugs.    Family History  Family History   Problem Relation Name Age of Onset    Diabetes Mother      Hypertension Father      Hypertension Sister          Allergies  Patient has no known allergies. NICKEL ALLERGY     Review of Systems  CONSTITUTIONAL: Denies weight loss, fever and chills.    - HEENT: Denies changes in vision and hearing.    - RESPIRATORY: Denies SOB and cough.    - CV: Denies palpitations and CP.    - GI: Denies abdominal pain, nausea, vomiting and diarrhea.    - : Denies dysuria and urinary frequency.    - MSK: See physical exam findings     - SKIN: Denies rash and pruritus.    - NEUROLOGICAL: Denies headache and syncope.    - PSYCHIATRIC: Denies recent changes in mood. Denies anxiety and depression.      Physical Exam - GENERAL: Alert and oriented x 3. No acute distress. Well-nourished.    - EYES: EOMI. Anicteric.    - HENT: Moist mucous membranes. No scleral icterus. No cervical lymphadenopathy.    - LUNGS: Clear to auscultation bilaterally. No accessory muscle use.    - CARDIOVASCULAR:  Regular rate and rhythm. No murmur. No JVD.    - ABDOMEN: Soft, non-tender and non-distended. No palpable masses.    - EXTREMITIES: Pain with ROM     - NEUROLOGIC: No focal neurological deficits. CN II-XII grossly intact, but not individually tested.    - PSYCHIATRIC: Cooperative. Appropriate mood and affect.      Last Recorded Vitals  There were no vitals taken for this visit.    Relevant Results      Scheduled medications    Continuous medications    PRN medications    No results found for this or any previous visit (from the past 24 hours).    Assessment/Plan   Assessment & Plan  Unilateral primary osteoarthritis, right knee      Right NICKEL FREE - Tonia - TKA    (Contralateral sizes were 8 Femur, E tibia, 11mm poly, 32 pat)         I spent 10 minutes in the professional and overall care of this patient.      PETE ZunigaC

## 2025-03-28 ENCOUNTER — ANESTHESIA EVENT (OUTPATIENT)
Dept: OPERATING ROOM | Facility: HOSPITAL | Age: 45
End: 2025-03-28
Payer: COMMERCIAL

## 2025-03-28 ENCOUNTER — ANESTHESIA (OUTPATIENT)
Dept: OPERATING ROOM | Facility: HOSPITAL | Age: 45
End: 2025-03-28
Payer: COMMERCIAL

## 2025-03-28 ENCOUNTER — HOSPITAL ENCOUNTER (OUTPATIENT)
Facility: HOSPITAL | Age: 45
Discharge: HOME | End: 2025-03-29
Attending: ORTHOPAEDIC SURGERY | Admitting: ORTHOPAEDIC SURGERY
Payer: COMMERCIAL

## 2025-03-28 DIAGNOSIS — M17.11 UNILATERAL PRIMARY OSTEOARTHRITIS, RIGHT KNEE: Primary | ICD-10-CM

## 2025-03-28 DIAGNOSIS — Z96.651 TOTAL KNEE REPLACEMENT STATUS, RIGHT: ICD-10-CM

## 2025-03-28 LAB — HCG UR QL IA.RAPID: NEGATIVE

## 2025-03-28 PROCEDURE — 2780000003 HC OR 278 NO HCPCS: Performed by: ORTHOPAEDIC SURGERY

## 2025-03-28 PROCEDURE — 2500000004 HC RX 250 GENERAL PHARMACY W/ HCPCS (ALT 636 FOR OP/ED): Performed by: ORTHOPAEDIC SURGERY

## 2025-03-28 PROCEDURE — 2500000004 HC RX 250 GENERAL PHARMACY W/ HCPCS (ALT 636 FOR OP/ED): Performed by: STUDENT IN AN ORGANIZED HEALTH CARE EDUCATION/TRAINING PROGRAM

## 2025-03-28 PROCEDURE — 97161 PT EVAL LOW COMPLEX 20 MIN: CPT | Mod: GP

## 2025-03-28 PROCEDURE — 2500000001 HC RX 250 WO HCPCS SELF ADMINISTERED DRUGS (ALT 637 FOR MEDICARE OP): Performed by: STUDENT IN AN ORGANIZED HEALTH CARE EDUCATION/TRAINING PROGRAM

## 2025-03-28 PROCEDURE — 3700000002 HC GENERAL ANESTHESIA TIME - EACH INCREMENTAL 1 MINUTE: Performed by: ORTHOPAEDIC SURGERY

## 2025-03-28 PROCEDURE — 2500000002 HC RX 250 W HCPCS SELF ADMINISTERED DRUGS (ALT 637 FOR MEDICARE OP, ALT 636 FOR OP/ED)

## 2025-03-28 PROCEDURE — 97116 GAIT TRAINING THERAPY: CPT | Mod: GP

## 2025-03-28 PROCEDURE — 7100000011 HC EXTENDED STAY RECOVERY HOURLY - NURSING UNIT

## 2025-03-28 PROCEDURE — 81025 URINE PREGNANCY TEST: CPT | Performed by: ORTHOPAEDIC SURGERY

## 2025-03-28 PROCEDURE — 2500000004 HC RX 250 GENERAL PHARMACY W/ HCPCS (ALT 636 FOR OP/ED): Performed by: NURSE ANESTHETIST, CERTIFIED REGISTERED

## 2025-03-28 PROCEDURE — 7100000001 HC RECOVERY ROOM TIME - INITIAL BASE CHARGE: Performed by: ORTHOPAEDIC SURGERY

## 2025-03-28 PROCEDURE — 3600000005 HC OR TIME - INITIAL BASE CHARGE - PROCEDURE LEVEL FIVE: Performed by: ORTHOPAEDIC SURGERY

## 2025-03-28 PROCEDURE — C1776 JOINT DEVICE (IMPLANTABLE): HCPCS | Performed by: ORTHOPAEDIC SURGERY

## 2025-03-28 PROCEDURE — 27447 TOTAL KNEE ARTHROPLASTY: CPT | Performed by: ORTHOPAEDIC SURGERY

## 2025-03-28 PROCEDURE — C1713 ANCHOR/SCREW BN/BN,TIS/BN: HCPCS | Performed by: ORTHOPAEDIC SURGERY

## 2025-03-28 PROCEDURE — 3700000001 HC GENERAL ANESTHESIA TIME - INITIAL BASE CHARGE: Performed by: ORTHOPAEDIC SURGERY

## 2025-03-28 PROCEDURE — 2500000001 HC RX 250 WO HCPCS SELF ADMINISTERED DRUGS (ALT 637 FOR MEDICARE OP): Performed by: ORTHOPAEDIC SURGERY

## 2025-03-28 PROCEDURE — 2720000007 HC OR 272 NO HCPCS: Performed by: ORTHOPAEDIC SURGERY

## 2025-03-28 PROCEDURE — 2500000005 HC RX 250 GENERAL PHARMACY W/O HCPCS: Performed by: STUDENT IN AN ORGANIZED HEALTH CARE EDUCATION/TRAINING PROGRAM

## 2025-03-28 PROCEDURE — 2500000005 HC RX 250 GENERAL PHARMACY W/O HCPCS: Performed by: ORTHOPAEDIC SURGERY

## 2025-03-28 PROCEDURE — 3600000010 HC OR TIME - EACH INCREMENTAL 1 MINUTE - PROCEDURE LEVEL FIVE: Performed by: ORTHOPAEDIC SURGERY

## 2025-03-28 PROCEDURE — 2500000002 HC RX 250 W HCPCS SELF ADMINISTERED DRUGS (ALT 637 FOR MEDICARE OP, ALT 636 FOR OP/ED): Performed by: STUDENT IN AN ORGANIZED HEALTH CARE EDUCATION/TRAINING PROGRAM

## 2025-03-28 PROCEDURE — 2500000002 HC RX 250 W HCPCS SELF ADMINISTERED DRUGS (ALT 637 FOR MEDICARE OP, ALT 636 FOR OP/ED): Performed by: ORTHOPAEDIC SURGERY

## 2025-03-28 PROCEDURE — 2500000001 HC RX 250 WO HCPCS SELF ADMINISTERED DRUGS (ALT 637 FOR MEDICARE OP): Performed by: PHYSICIAN ASSISTANT

## 2025-03-28 PROCEDURE — 7100000002 HC RECOVERY ROOM TIME - EACH INCREMENTAL 1 MINUTE: Performed by: ORTHOPAEDIC SURGERY

## 2025-03-28 PROCEDURE — 27447 TOTAL KNEE ARTHROPLASTY: CPT | Performed by: STUDENT IN AN ORGANIZED HEALTH CARE EDUCATION/TRAINING PROGRAM

## 2025-03-28 DEVICE — IMPLANTABLE DEVICE
Type: IMPLANTABLE DEVICE | Site: KNEE | Status: FUNCTIONAL
Brand: PERSONA® VIVACIT-E®

## 2025-03-28 DEVICE — IMPLANTABLE DEVICE
Type: IMPLANTABLE DEVICE | Site: KNEE | Status: FUNCTIONAL
Brand: PERSONA® THE PERSONALIZED KNEE®

## 2025-03-28 DEVICE — IMPLANTABLE DEVICE
Type: IMPLANTABLE DEVICE | Site: KNEE | Status: FUNCTIONAL
Brand: BIOMET® BONE CEMENT R

## 2025-03-28 DEVICE — DRILL PIN, TROCAR, HEADLESS: Type: IMPLANTABLE DEVICE | Site: KNEE | Status: NON-FUNCTIONAL

## 2025-03-28 DEVICE — IMPLANTABLE DEVICE
Type: IMPLANTABLE DEVICE | Site: KNEE | Status: NON-FUNCTIONAL
Brand: PERSONA™

## 2025-03-28 RX ORDER — ASPIRIN 81 MG/1
81 TABLET ORAL 2 TIMES DAILY
Status: CANCELLED | OUTPATIENT
Start: 2025-03-28

## 2025-03-28 RX ORDER — MORPHINE SULFATE 2 MG/ML
2 INJECTION, SOLUTION INTRAMUSCULAR; INTRAVENOUS EVERY 2 HOUR PRN
Status: DISCONTINUED | OUTPATIENT
Start: 2025-03-28 | End: 2025-03-29 | Stop reason: HOSPADM

## 2025-03-28 RX ORDER — ONDANSETRON 4 MG/1
4 TABLET, ORALLY DISINTEGRATING ORAL EVERY 8 HOURS PRN
Status: DISCONTINUED | OUTPATIENT
Start: 2025-03-28 | End: 2025-03-29 | Stop reason: HOSPADM

## 2025-03-28 RX ORDER — ONDANSETRON HYDROCHLORIDE 2 MG/ML
INJECTION, SOLUTION INTRAVENOUS AS NEEDED
Status: DISCONTINUED | OUTPATIENT
Start: 2025-03-28 | End: 2025-03-28

## 2025-03-28 RX ORDER — PROCHLORPERAZINE 25 MG/1
25 SUPPOSITORY RECTAL EVERY 12 HOURS PRN
Status: DISCONTINUED | OUTPATIENT
Start: 2025-03-28 | End: 2025-03-29 | Stop reason: HOSPADM

## 2025-03-28 RX ORDER — KETOROLAC TROMETHAMINE 15 MG/ML
15 INJECTION, SOLUTION INTRAMUSCULAR; INTRAVENOUS EVERY 6 HOURS
Status: COMPLETED | OUTPATIENT
Start: 2025-03-28 | End: 2025-03-29

## 2025-03-28 RX ORDER — CYCLOBENZAPRINE HCL 10 MG
10 TABLET ORAL 3 TIMES DAILY PRN
Status: DISCONTINUED | OUTPATIENT
Start: 2025-03-28 | End: 2025-03-29 | Stop reason: HOSPADM

## 2025-03-28 RX ORDER — ACETAMINOPHEN 325 MG/1
975 TABLET ORAL ONCE
Status: DISCONTINUED | OUTPATIENT
Start: 2025-03-28 | End: 2025-03-28 | Stop reason: HOSPADM

## 2025-03-28 RX ORDER — BUPIVACAINE HYDROCHLORIDE 7.5 MG/ML
INJECTION, SOLUTION EPIDURAL; RETROBULBAR AS NEEDED
Status: DISCONTINUED | OUTPATIENT
Start: 2025-03-28 | End: 2025-03-28

## 2025-03-28 RX ORDER — OXYBUTYNIN CHLORIDE 5 MG/1
5 TABLET ORAL 3 TIMES DAILY
Status: DISCONTINUED | OUTPATIENT
Start: 2025-03-28 | End: 2025-03-29 | Stop reason: HOSPADM

## 2025-03-28 RX ORDER — OXYCODONE HYDROCHLORIDE 10 MG/1
10 TABLET ORAL EVERY 6 HOURS PRN
Status: DISCONTINUED | OUTPATIENT
Start: 2025-03-28 | End: 2025-03-28

## 2025-03-28 RX ORDER — HYDRALAZINE HYDROCHLORIDE 20 MG/ML
5 INJECTION INTRAMUSCULAR; INTRAVENOUS EVERY 30 MIN PRN
Status: DISCONTINUED | OUTPATIENT
Start: 2025-03-28 | End: 2025-03-28 | Stop reason: HOSPADM

## 2025-03-28 RX ORDER — TRANEXAMIC ACID 650 MG/1
1950 TABLET ORAL ONCE
Status: COMPLETED | OUTPATIENT
Start: 2025-03-28 | End: 2025-03-28

## 2025-03-28 RX ORDER — SOLIFENACIN SUCCINATE 10 MG/1
10 TABLET, FILM COATED ORAL DAILY
Status: DISCONTINUED | OUTPATIENT
Start: 2025-03-28 | End: 2025-03-28

## 2025-03-28 RX ORDER — FENTANYL CITRATE 50 UG/ML
12.5 INJECTION, SOLUTION INTRAMUSCULAR; INTRAVENOUS EVERY 5 MIN PRN
Status: DISCONTINUED | OUTPATIENT
Start: 2025-03-28 | End: 2025-03-28 | Stop reason: HOSPADM

## 2025-03-28 RX ORDER — ACETAMINOPHEN 325 MG/1
650 TABLET ORAL ONCE
Status: COMPLETED | OUTPATIENT
Start: 2025-03-28 | End: 2025-03-28

## 2025-03-28 RX ORDER — LIDOCAINE HYDROCHLORIDE 10 MG/ML
0.1 INJECTION, SOLUTION INFILTRATION; PERINEURAL ONCE
Status: DISCONTINUED | OUTPATIENT
Start: 2025-03-28 | End: 2025-03-28 | Stop reason: HOSPADM

## 2025-03-28 RX ORDER — VALSARTAN 160 MG/1
80 TABLET ORAL DAILY
Status: DISCONTINUED | OUTPATIENT
Start: 2025-03-28 | End: 2025-03-29 | Stop reason: HOSPADM

## 2025-03-28 RX ORDER — ONDANSETRON HYDROCHLORIDE 2 MG/ML
4 INJECTION, SOLUTION INTRAVENOUS EVERY 8 HOURS PRN
Status: DISCONTINUED | OUTPATIENT
Start: 2025-03-28 | End: 2025-03-29 | Stop reason: HOSPADM

## 2025-03-28 RX ORDER — MIDAZOLAM HYDROCHLORIDE 1 MG/ML
INJECTION, SOLUTION INTRAMUSCULAR; INTRAVENOUS AS NEEDED
Status: DISCONTINUED | OUTPATIENT
Start: 2025-03-28 | End: 2025-03-28

## 2025-03-28 RX ORDER — CELECOXIB 200 MG/1
200 CAPSULE ORAL ONCE
Status: COMPLETED | OUTPATIENT
Start: 2025-03-28 | End: 2025-03-28

## 2025-03-28 RX ORDER — DIPHENHYDRAMINE HYDROCHLORIDE 50 MG/ML
12.5 INJECTION, SOLUTION INTRAMUSCULAR; INTRAVENOUS ONCE AS NEEDED
Status: DISCONTINUED | OUTPATIENT
Start: 2025-03-28 | End: 2025-03-28 | Stop reason: HOSPADM

## 2025-03-28 RX ORDER — ATORVASTATIN CALCIUM 10 MG/1
10 TABLET, FILM COATED ORAL DAILY
Status: DISCONTINUED | OUTPATIENT
Start: 2025-03-28 | End: 2025-03-29 | Stop reason: HOSPADM

## 2025-03-28 RX ORDER — TRANEXAMIC ACID 650 MG/1
1950 TABLET ORAL ONCE
Status: COMPLETED | OUTPATIENT
Start: 2025-03-29 | End: 2025-03-29

## 2025-03-28 RX ORDER — HYDROMORPHONE HYDROCHLORIDE 1 MG/ML
1 INJECTION, SOLUTION INTRAMUSCULAR; INTRAVENOUS; SUBCUTANEOUS EVERY 5 MIN PRN
Status: DISCONTINUED | OUTPATIENT
Start: 2025-03-28 | End: 2025-03-28 | Stop reason: HOSPADM

## 2025-03-28 RX ORDER — NALOXONE HYDROCHLORIDE 0.4 MG/ML
0.2 INJECTION, SOLUTION INTRAMUSCULAR; INTRAVENOUS; SUBCUTANEOUS EVERY 5 MIN PRN
Status: DISCONTINUED | OUTPATIENT
Start: 2025-03-28 | End: 2025-03-29 | Stop reason: HOSPADM

## 2025-03-28 RX ORDER — ALBUTEROL SULFATE 0.83 MG/ML
2.5 SOLUTION RESPIRATORY (INHALATION) ONCE AS NEEDED
Status: DISCONTINUED | OUTPATIENT
Start: 2025-03-28 | End: 2025-03-28 | Stop reason: HOSPADM

## 2025-03-28 RX ORDER — HYDROMORPHONE HYDROCHLORIDE 1 MG/ML
INJECTION, SOLUTION INTRAMUSCULAR; INTRAVENOUS; SUBCUTANEOUS AS NEEDED
Status: DISCONTINUED | OUTPATIENT
Start: 2025-03-28 | End: 2025-03-28

## 2025-03-28 RX ORDER — OXYCODONE HYDROCHLORIDE 10 MG/1
10 TABLET ORAL EVERY 4 HOURS PRN
Status: DISCONTINUED | OUTPATIENT
Start: 2025-03-28 | End: 2025-03-29 | Stop reason: HOSPADM

## 2025-03-28 RX ORDER — SODIUM CHLORIDE, SODIUM LACTATE, POTASSIUM CHLORIDE, CALCIUM CHLORIDE 600; 310; 30; 20 MG/100ML; MG/100ML; MG/100ML; MG/100ML
100 INJECTION, SOLUTION INTRAVENOUS CONTINUOUS
Status: DISCONTINUED | OUTPATIENT
Start: 2025-03-28 | End: 2025-03-28 | Stop reason: HOSPADM

## 2025-03-28 RX ORDER — BISACODYL 5 MG
10 TABLET, DELAYED RELEASE (ENTERIC COATED) ORAL DAILY PRN
Status: DISCONTINUED | OUTPATIENT
Start: 2025-03-28 | End: 2025-03-29 | Stop reason: HOSPADM

## 2025-03-28 RX ORDER — PROCHLORPERAZINE EDISYLATE 5 MG/ML
10 INJECTION INTRAMUSCULAR; INTRAVENOUS EVERY 6 HOURS PRN
Status: DISCONTINUED | OUTPATIENT
Start: 2025-03-28 | End: 2025-03-29 | Stop reason: HOSPADM

## 2025-03-28 RX ORDER — OXYCODONE HYDROCHLORIDE 5 MG/1
5 TABLET ORAL EVERY 4 HOURS PRN
Status: DISCONTINUED | OUTPATIENT
Start: 2025-03-28 | End: 2025-03-28 | Stop reason: HOSPADM

## 2025-03-28 RX ORDER — ENOXAPARIN SODIUM 100 MG/ML
40 INJECTION SUBCUTANEOUS DAILY
Status: DISCONTINUED | OUTPATIENT
Start: 2025-03-29 | End: 2025-03-29 | Stop reason: HOSPADM

## 2025-03-28 RX ORDER — ACETAMINOPHEN 325 MG/1
650 TABLET ORAL EVERY 6 HOURS SCHEDULED
Status: DISCONTINUED | OUTPATIENT
Start: 2025-03-28 | End: 2025-03-29 | Stop reason: HOSPADM

## 2025-03-28 RX ORDER — MEPERIDINE HYDROCHLORIDE 50 MG/ML
12.5 INJECTION INTRAMUSCULAR; INTRAVENOUS; SUBCUTANEOUS EVERY 10 MIN PRN
Status: DISCONTINUED | OUTPATIENT
Start: 2025-03-28 | End: 2025-03-28 | Stop reason: HOSPADM

## 2025-03-28 RX ORDER — MAGNESIUM SULFATE HEPTAHYDRATE 500 MG/ML
INJECTION, SOLUTION INTRAMUSCULAR; INTRAVENOUS AS NEEDED
Status: DISCONTINUED | OUTPATIENT
Start: 2025-03-28 | End: 2025-03-28

## 2025-03-28 RX ORDER — PROCHLORPERAZINE MALEATE 10 MG
10 TABLET ORAL EVERY 6 HOURS PRN
Status: DISCONTINUED | OUTPATIENT
Start: 2025-03-28 | End: 2025-03-29 | Stop reason: HOSPADM

## 2025-03-28 RX ORDER — PROPOFOL 10 MG/ML
INJECTION, EMULSION INTRAVENOUS CONTINUOUS PRN
Status: DISCONTINUED | OUTPATIENT
Start: 2025-03-28 | End: 2025-03-28

## 2025-03-28 RX ORDER — CEFAZOLIN SODIUM 2 G/100ML
2 INJECTION, SOLUTION INTRAVENOUS EVERY 8 HOURS
Status: COMPLETED | OUTPATIENT
Start: 2025-03-28 | End: 2025-03-29

## 2025-03-28 RX ORDER — CEFAZOLIN SODIUM 2 G/100ML
2 INJECTION, SOLUTION INTRAVENOUS ONCE
Status: COMPLETED | OUTPATIENT
Start: 2025-03-28 | End: 2025-03-28

## 2025-03-28 RX ORDER — DOCUSATE SODIUM 100 MG/1
100 CAPSULE, LIQUID FILLED ORAL 2 TIMES DAILY
Status: DISCONTINUED | OUTPATIENT
Start: 2025-03-28 | End: 2025-03-29 | Stop reason: HOSPADM

## 2025-03-28 RX ORDER — ONDANSETRON HYDROCHLORIDE 2 MG/ML
4 INJECTION, SOLUTION INTRAVENOUS ONCE AS NEEDED
Status: DISCONTINUED | OUTPATIENT
Start: 2025-03-28 | End: 2025-03-28 | Stop reason: HOSPADM

## 2025-03-28 RX ORDER — MIDAZOLAM HYDROCHLORIDE 1 MG/ML
1 INJECTION, SOLUTION INTRAMUSCULAR; INTRAVENOUS ONCE AS NEEDED
Status: DISCONTINUED | OUTPATIENT
Start: 2025-03-28 | End: 2025-03-28 | Stop reason: HOSPADM

## 2025-03-28 RX ORDER — OXYCODONE HYDROCHLORIDE 5 MG/1
5 TABLET ORAL EVERY 4 HOURS PRN
Status: DISCONTINUED | OUTPATIENT
Start: 2025-03-28 | End: 2025-03-29 | Stop reason: HOSPADM

## 2025-03-28 RX ORDER — ROPIVACAINE/EPI/CLONIDINE/KET 2.46-0.005
SYRINGE (ML) INJECTION AS NEEDED
Status: DISCONTINUED | OUTPATIENT
Start: 2025-03-28 | End: 2025-03-28 | Stop reason: HOSPADM

## 2025-03-28 RX ORDER — SODIUM CHLORIDE, SODIUM LACTATE, POTASSIUM CHLORIDE, CALCIUM CHLORIDE 600; 310; 30; 20 MG/100ML; MG/100ML; MG/100ML; MG/100ML
50 INJECTION, SOLUTION INTRAVENOUS CONTINUOUS
Status: DISCONTINUED | OUTPATIENT
Start: 2025-03-28 | End: 2025-03-29 | Stop reason: HOSPADM

## 2025-03-28 RX ORDER — FENTANYL CITRATE 50 UG/ML
INJECTION, SOLUTION INTRAMUSCULAR; INTRAVENOUS AS NEEDED
Status: DISCONTINUED | OUTPATIENT
Start: 2025-03-28 | End: 2025-03-28

## 2025-03-28 RX ORDER — CEFAZOLIN SODIUM 2 G/100ML
2 INJECTION, SOLUTION INTRAVENOUS EVERY 8 HOURS
Status: DISCONTINUED | OUTPATIENT
Start: 2025-03-28 | End: 2025-03-28

## 2025-03-28 RX ADMIN — PROPOFOL 50 MG: 10 INJECTION, EMULSION INTRAVENOUS at 12:20

## 2025-03-28 RX ADMIN — KETOROLAC TROMETHAMINE 15 MG: 15 INJECTION, SOLUTION INTRAMUSCULAR; INTRAVENOUS at 21:18

## 2025-03-28 RX ADMIN — CEFAZOLIN SODIUM 2 G: 2 INJECTION, SOLUTION INTRAVENOUS at 18:28

## 2025-03-28 RX ADMIN — HYDROMORPHONE HYDROCHLORIDE 0.5 MG: 1 INJECTION, SOLUTION INTRAMUSCULAR; INTRAVENOUS; SUBCUTANEOUS at 12:20

## 2025-03-28 RX ADMIN — SODIUM CHLORIDE, POTASSIUM CHLORIDE, SODIUM LACTATE AND CALCIUM CHLORIDE: 600; 310; 30; 20 INJECTION, SOLUTION INTRAVENOUS at 10:06

## 2025-03-28 RX ADMIN — MIDAZOLAM 2 MG: 1 INJECTION INTRAMUSCULAR; INTRAVENOUS at 10:40

## 2025-03-28 RX ADMIN — ATORVASTATIN CALCIUM 10 MG: 10 TABLET, FILM COATED ORAL at 21:19

## 2025-03-28 RX ADMIN — FENTANYL CITRATE 25 MCG: 50 INJECTION, SOLUTION INTRAMUSCULAR; INTRAVENOUS at 12:13

## 2025-03-28 RX ADMIN — OXYCODONE HYDROCHLORIDE 10 MG: 10 TABLET ORAL at 21:19

## 2025-03-28 RX ADMIN — ACETAMINOPHEN 650 MG: 325 TABLET ORAL at 09:24

## 2025-03-28 RX ADMIN — TRANEXAMIC ACID 1950 MG: 650 TABLET ORAL at 14:51

## 2025-03-28 RX ADMIN — MAGNESIUM SULFATE HEPTAHYDRATE 2 G: 500 INJECTION, SOLUTION INTRAMUSCULAR; INTRAVENOUS at 11:00

## 2025-03-28 RX ADMIN — ONDANSETRON 4 MG: 2 INJECTION INTRAMUSCULAR; INTRAVENOUS at 10:38

## 2025-03-28 RX ADMIN — OXYBUTYNIN CHLORIDE 5 MG: 5 TABLET ORAL at 21:19

## 2025-03-28 RX ADMIN — CELECOXIB 200 MG: 200 CAPSULE ORAL at 09:24

## 2025-03-28 RX ADMIN — OXYBUTYNIN CHLORIDE 5 MG: 5 TABLET ORAL at 14:51

## 2025-03-28 RX ADMIN — KETOROLAC TROMETHAMINE 15 MG: 15 INJECTION, SOLUTION INTRAMUSCULAR; INTRAVENOUS at 14:51

## 2025-03-28 RX ADMIN — FENTANYL CITRATE 25 MCG: 50 INJECTION, SOLUTION INTRAMUSCULAR; INTRAVENOUS at 11:15

## 2025-03-28 RX ADMIN — TRANEXAMIC ACID 1950 MG: 650 TABLET ORAL at 09:24

## 2025-03-28 RX ADMIN — VALSARTAN 80 MG: 160 TABLET, FILM COATED ORAL at 21:19

## 2025-03-28 RX ADMIN — FENTANYL CITRATE 25 MCG: 50 INJECTION, SOLUTION INTRAMUSCULAR; INTRAVENOUS at 10:52

## 2025-03-28 RX ADMIN — POVIDONE-IODINE 1 APPLICATION: 5 SOLUTION TOPICAL at 09:28

## 2025-03-28 RX ADMIN — HYDROMORPHONE HYDROCHLORIDE 0.5 MG: 1 INJECTION, SOLUTION INTRAMUSCULAR; INTRAVENOUS; SUBCUTANEOUS at 12:36

## 2025-03-28 RX ADMIN — PROPOFOL 100 MCG/KG/MIN: 10 INJECTION, EMULSION INTRAVENOUS at 10:53

## 2025-03-28 RX ADMIN — ONDANSETRON 4 MG: 2 INJECTION INTRAMUSCULAR; INTRAVENOUS at 14:55

## 2025-03-28 RX ADMIN — SODIUM CHLORIDE, POTASSIUM CHLORIDE, SODIUM LACTATE AND CALCIUM CHLORIDE 50 ML/HR: 600; 310; 30; 20 INJECTION, SOLUTION INTRAVENOUS at 14:51

## 2025-03-28 RX ADMIN — FENTANYL CITRATE 25 MCG: 50 INJECTION, SOLUTION INTRAMUSCULAR; INTRAVENOUS at 11:56

## 2025-03-28 RX ADMIN — DOCUSATE SODIUM 100 MG: 100 CAPSULE, LIQUID FILLED ORAL at 21:19

## 2025-03-28 RX ADMIN — PROPOFOL 50 MG: 10 INJECTION, EMULSION INTRAVENOUS at 12:08

## 2025-03-28 RX ADMIN — OXYCODONE HYDROCHLORIDE 10 MG: 10 TABLET ORAL at 16:24

## 2025-03-28 RX ADMIN — PROPOFOL 100 MG: 10 INJECTION, EMULSION INTRAVENOUS at 10:54

## 2025-03-28 RX ADMIN — ACETAMINOPHEN 650 MG: 325 TABLET ORAL at 18:28

## 2025-03-28 RX ADMIN — CEFAZOLIN SODIUM 2 G: 2 INJECTION, SOLUTION INTRAVENOUS at 11:00

## 2025-03-28 RX ADMIN — BUPIVACAINE HYDROCHLORIDE 1.4 ML: 7.5 INJECTION, SOLUTION EPIDURAL; RETROBULBAR at 10:49

## 2025-03-28 SDOH — SOCIAL STABILITY: SOCIAL INSECURITY: DOES ANYONE TRY TO KEEP YOU FROM HAVING/CONTACTING OTHER FRIENDS OR DOING THINGS OUTSIDE YOUR HOME?: NO

## 2025-03-28 SDOH — ECONOMIC STABILITY: INCOME INSECURITY: IN THE PAST 12 MONTHS HAS THE ELECTRIC, GAS, OIL, OR WATER COMPANY THREATENED TO SHUT OFF SERVICES IN YOUR HOME?: NO

## 2025-03-28 SDOH — ECONOMIC STABILITY: FOOD INSECURITY: HOW HARD IS IT FOR YOU TO PAY FOR THE VERY BASICS LIKE FOOD, HOUSING, MEDICAL CARE, AND HEATING?: NOT VERY HARD

## 2025-03-28 SDOH — SOCIAL STABILITY: SOCIAL INSECURITY: WERE YOU ABLE TO COMPLETE ALL THE BEHAVIORAL HEALTH SCREENINGS?: YES

## 2025-03-28 SDOH — SOCIAL STABILITY: SOCIAL INSECURITY: WITHIN THE LAST YEAR, HAVE YOU BEEN HUMILIATED OR EMOTIONALLY ABUSED IN OTHER WAYS BY YOUR PARTNER OR EX-PARTNER?: NO

## 2025-03-28 SDOH — ECONOMIC STABILITY: HOUSING INSECURITY: AT ANY TIME IN THE PAST 12 MONTHS, WERE YOU HOMELESS OR LIVING IN A SHELTER (INCLUDING NOW)?: NO

## 2025-03-28 SDOH — SOCIAL STABILITY: SOCIAL INSECURITY: DO YOU FEEL UNSAFE GOING BACK TO THE PLACE WHERE YOU ARE LIVING?: NO

## 2025-03-28 SDOH — ECONOMIC STABILITY: FOOD INSECURITY: WITHIN THE PAST 12 MONTHS, YOU WORRIED THAT YOUR FOOD WOULD RUN OUT BEFORE YOU GOT THE MONEY TO BUY MORE.: NEVER TRUE

## 2025-03-28 SDOH — ECONOMIC STABILITY: TRANSPORTATION INSECURITY: IN THE PAST 12 MONTHS, HAS LACK OF TRANSPORTATION KEPT YOU FROM MEDICAL APPOINTMENTS OR FROM GETTING MEDICATIONS?: NO

## 2025-03-28 SDOH — ECONOMIC STABILITY: HOUSING INSECURITY: IN THE PAST 12 MONTHS, HOW MANY TIMES HAVE YOU MOVED WHERE YOU WERE LIVING?: 0

## 2025-03-28 SDOH — ECONOMIC STABILITY: FOOD INSECURITY: WITHIN THE PAST 12 MONTHS, THE FOOD YOU BOUGHT JUST DIDN'T LAST AND YOU DIDN'T HAVE MONEY TO GET MORE.: NEVER TRUE

## 2025-03-28 SDOH — ECONOMIC STABILITY: HOUSING INSECURITY: IN THE LAST 12 MONTHS, WAS THERE A TIME WHEN YOU WERE NOT ABLE TO PAY THE MORTGAGE OR RENT ON TIME?: NO

## 2025-03-28 SDOH — SOCIAL STABILITY: SOCIAL INSECURITY: ARE THERE ANY APPARENT SIGNS OF INJURIES/BEHAVIORS THAT COULD BE RELATED TO ABUSE/NEGLECT?: NO

## 2025-03-28 SDOH — SOCIAL STABILITY: SOCIAL INSECURITY: HAS ANYONE EVER THREATENED TO HURT YOUR FAMILY OR YOUR PETS?: NO

## 2025-03-28 SDOH — SOCIAL STABILITY: SOCIAL INSECURITY: ARE YOU OR HAVE YOU BEEN THREATENED OR ABUSED PHYSICALLY, EMOTIONALLY, OR SEXUALLY BY ANYONE?: NO

## 2025-03-28 SDOH — SOCIAL STABILITY: SOCIAL INSECURITY: DO YOU FEEL ANYONE HAS EXPLOITED OR TAKEN ADVANTAGE OF YOU FINANCIALLY OR OF YOUR PERSONAL PROPERTY?: NO

## 2025-03-28 SDOH — SOCIAL STABILITY: SOCIAL INSECURITY: ABUSE: ADULT

## 2025-03-28 SDOH — SOCIAL STABILITY: SOCIAL INSECURITY: WITHIN THE LAST YEAR, HAVE YOU BEEN AFRAID OF YOUR PARTNER OR EX-PARTNER?: NO

## 2025-03-28 SDOH — SOCIAL STABILITY: SOCIAL INSECURITY: HAVE YOU HAD THOUGHTS OF HARMING ANYONE ELSE?: NO

## 2025-03-28 ASSESSMENT — PAIN - FUNCTIONAL ASSESSMENT
PAIN_FUNCTIONAL_ASSESSMENT: 0-10
PAIN_FUNCTIONAL_ASSESSMENT: UNABLE TO SELF-REPORT
PAIN_FUNCTIONAL_ASSESSMENT: 0-10
PAIN_FUNCTIONAL_ASSESSMENT: UNABLE TO SELF-REPORT
PAIN_FUNCTIONAL_ASSESSMENT: 0-10

## 2025-03-28 ASSESSMENT — COGNITIVE AND FUNCTIONAL STATUS - GENERAL
MOVING FROM LYING ON BACK TO SITTING ON SIDE OF FLAT BED WITH BEDRAILS: A LITTLE
STANDING UP FROM CHAIR USING ARMS: A LITTLE
WALKING IN HOSPITAL ROOM: A LITTLE
MOBILITY SCORE: 16
MOBILITY SCORE: 19
WALKING IN HOSPITAL ROOM: A LITTLE
MOVING TO AND FROM BED TO CHAIR: A LITTLE
CLIMB 3 TO 5 STEPS WITH RAILING: A LOT
CLIMB 3 TO 5 STEPS WITH RAILING: TOTAL
TURNING FROM BACK TO SIDE WHILE IN FLAT BAD: A LITTLE
DRESSING REGULAR LOWER BODY CLOTHING: A LITTLE
DAILY ACTIVITIY SCORE: 23
MOVING TO AND FROM BED TO CHAIR: A LITTLE
PATIENT BASELINE BEDBOUND: NO
STANDING UP FROM CHAIR USING ARMS: A LITTLE

## 2025-03-28 ASSESSMENT — PAIN DESCRIPTION - LOCATION
LOCATION: KNEE
LOCATION: KNEE

## 2025-03-28 ASSESSMENT — PAIN SCALES - GENERAL
PAINLEVEL_OUTOF10: 0 - NO PAIN
PAINLEVEL_OUTOF10: 5 - MODERATE PAIN
PAINLEVEL_OUTOF10: 0 - NO PAIN
PAINLEVEL_OUTOF10: 5 - MODERATE PAIN
PAINLEVEL_OUTOF10: 8
PAINLEVEL_OUTOF10: 0 - NO PAIN
PAINLEVEL_OUTOF10: 3

## 2025-03-28 ASSESSMENT — LIFESTYLE VARIABLES
AUDIT-C TOTAL SCORE: 0
HOW OFTEN DO YOU HAVE A DRINK CONTAINING ALCOHOL: NEVER
HOW OFTEN DO YOU HAVE 6 OR MORE DRINKS ON ONE OCCASION: NEVER
HOW MANY STANDARD DRINKS CONTAINING ALCOHOL DO YOU HAVE ON A TYPICAL DAY: PATIENT DOES NOT DRINK
SKIP TO QUESTIONS 9-10: 1
AUDIT-C TOTAL SCORE: 0

## 2025-03-28 ASSESSMENT — COLUMBIA-SUICIDE SEVERITY RATING SCALE - C-SSRS
1. IN THE PAST MONTH, HAVE YOU WISHED YOU WERE DEAD OR WISHED YOU COULD GO TO SLEEP AND NOT WAKE UP?: NO
6. HAVE YOU EVER DONE ANYTHING, STARTED TO DO ANYTHING, OR PREPARED TO DO ANYTHING TO END YOUR LIFE?: NO
6. HAVE YOU EVER DONE ANYTHING, STARTED TO DO ANYTHING, OR PREPARED TO DO ANYTHING TO END YOUR LIFE?: NO
2. HAVE YOU ACTUALLY HAD ANY THOUGHTS OF KILLING YOURSELF?: NO
2. HAVE YOU ACTUALLY HAD ANY THOUGHTS OF KILLING YOURSELF?: NO
1. IN THE PAST MONTH, HAVE YOU WISHED YOU WERE DEAD OR WISHED YOU COULD GO TO SLEEP AND NOT WAKE UP?: NO

## 2025-03-28 ASSESSMENT — ACTIVITIES OF DAILY LIVING (ADL)
GROOMING: INDEPENDENT
ADL_ASSISTANCE: INDEPENDENT
LACK_OF_TRANSPORTATION: NO
DRESSING YOURSELF: INDEPENDENT
HEARING - RIGHT EAR: FUNCTIONAL
LACK_OF_TRANSPORTATION: NO
ADEQUATE_TO_COMPLETE_ADL: YES
WALKS IN HOME: NEEDS ASSISTANCE
ASSISTIVE_DEVICE: WALKER
HEARING - LEFT EAR: FUNCTIONAL
FEEDING YOURSELF: INDEPENDENT
BATHING: INDEPENDENT
PATIENT'S MEMORY ADEQUATE TO SAFELY COMPLETE DAILY ACTIVITIES?: YES
TOILETING: INDEPENDENT
JUDGMENT_ADEQUATE_SAFELY_COMPLETE_DAILY_ACTIVITIES: YES

## 2025-03-28 ASSESSMENT — PATIENT HEALTH QUESTIONNAIRE - PHQ9
SUM OF ALL RESPONSES TO PHQ9 QUESTIONS 1 & 2: 0
2. FEELING DOWN, DEPRESSED OR HOPELESS: NOT AT ALL
1. LITTLE INTEREST OR PLEASURE IN DOING THINGS: NOT AT ALL

## 2025-03-28 ASSESSMENT — PAIN DESCRIPTION - ORIENTATION
ORIENTATION: RIGHT
ORIENTATION: RIGHT

## 2025-03-28 NOTE — PROGRESS NOTES
Physical Therapy    Physical Therapy Evaluation & Treatment    Patient Name: Keiry Kline  MRN: [id number]  Today's Date: 3/28/2025   Time Calculation  Start Time: 1510  Stop Time: 1539  Time Calculation (min): 29 min  4123/4123-A    Assessment/Plan   PT Assessment  PT Assessment Results: Decreased strength, Decreased range of motion, Decreased endurance, Impaired balance, Decreased mobility, Decreased safety awareness  Rehab Prognosis: Good  Medical Staff Made Aware: Yes  Strengths: Access to adaptive/assistive products, Capable of completing ADLs semi/independent, Support and attitude of living partners, Support of extended family/friends, Premorbid level of function  End of Session Communication: Bedside nurse  Assessment Comment: Pt is a 46 y/o female s/p R TKA with Dr. Gallagher 3/28. Pt presents with decreased strength, ROM, endurance and balance. Pt able to tolerate supine exercises, bed mobility, transfers and gait training this date. She is functioning below baseline level of function and will benefit from skilled therapy during stay to improve overall functional mobility and safety awareness. Upon discharge pt will benefit from low intensity therapy for continued improvement in functional mobility.     End of Session Patient Position: Up in chair, Alarm on (call light within reach and dtr present)  IP OR SWING BED PT PLAN  Inpatient or Swing Bed: Inpatient  PT Plan  Treatment/Interventions: Bed mobility, Transfer training, Gait training, Stair training, Balance training, Neuromuscular re-education, Endurance training, Strengthening, Range of motion, Therapeutic exercise, Therapeutic activity, Home exercise program, Positioning, Postural re-education  PT Plan: Ongoing PT  PT Frequency: BID  PT Discharge Recommendations: Low intensity level of continued care  Equipment Recommended upon Discharge: Wheeled walker  PT Recommended Transfer Status: Assist x1, Assistive device, Contact guard  PT - OK to Discharge:  Yes (Once medically cleared)    Current Problem:  Patient Active Problem List   Diagnosis    Dyslipidemia    Effusion of left ankle    Elevated blood pressure reading    Fatigue    Hypomagnesemia    Left wrist tendinitis    Menorrhagia    Myopia of both eyes    Hyperactivity of bladder    Overweight    Posterior tibial tendinitis of left leg    Primary osteoarthritis of both knees    Vitamin B12 deficiency    Vitamin D deficiency    Visit for screening mammogram    Prepatellar bursitis of both knees    Routine general medical examination at a health care facility    Overactive bladder    Need for immunization against influenza    Acute non-recurrent maxillary sinusitis    Unilateral primary osteoarthritis, left knee    Total knee replacement status, left    Unilateral primary osteoarthritis, right knee    Immunity status testing    Screening-pulmonary TB    Screening examination for pulmonary tuberculosis    Screening for viral disease    Primary hypertension    Class 2 severe obesity due to excess calories with serious comorbidity and body mass index (BMI) of 38.0 to 38.9 in adult    Total knee replacement status, right     Subjective     General Visit Information:  General  Reason for Referral: P/w R knee OA. Pt now s/p R TKA with Dr. Gallagher 3/28.  Referred By: Dr. Gallagher  Past Medical History Relevant to Rehab: R knee OA, L knee OA, L TKA (Jan 2025), overactive bladder  Family/Caregiver Present: Yes  Caregiver Feedback: Dtr present who was helpful and supportive  Prior to Session Communication: Bedside nurse  Patient Position Received: Bed, 3 rail up, Alarm on  Preferred Learning Style: verbal  General Comment: Pt pleasant and agreeable to work with therapy.    Home Living:  Home Living  Type of Home: House  Lives With: Spouse, Adult children, Dependent children  Home Adaptive Equipment: Walker rolling or standard, Cane  Home Layout: Two level, 1/2 bath on main level, Bed/bath upstairs, Able to live on main level  with bedroom/bathroom, Stairs to alternate level with rails  Alternate Level Stairs-Number of Steps: Flight of steps to second floor bed/bath; however, pt able to stay on the first floor  Home Access: Stairs to enter without rails  Entrance Stairs-Number of Steps: 2  Bathroom Shower/Tub: Tub/shower unit  Bathroom Equipment: None  Home Living Comments: Pt has assistance from family upon discharge    Prior Level of Function:  Prior Function Per Pt/Caregiver Report  Level of [place]: Independent with ADLs and functional transfers, Independent with homemaking with ambulation  Receives Help From: Family  ADL Assistance: Independent  Homemaking Assistance: Independent  Ambulatory Assistance: Independent (Mod I with cane)  Vocational: Full time employment  Prior Function Comments: (+) drives, (-) falls    Precautions:  Precautions  LE Weight Bearing Status: Weight Bearing as Tolerated (RLE)  Medical Precautions: Fall precautions  Post-Surgical Precautions: Right total knee precautions  Precautions Comment: Pt educated on general knee precautions and demos compliance with functional mobility tasks.    Objective     Pain:  Pain Assessment  Pain Assessment: 0-10  0-10 (Numeric) Pain Score: 0 - No pain    Cognition:  Cognition  Overall Cognitive Status: Within Functional Limits  Orientation Level: Oriented X4    General Assessments:  General Observation  General Observation: Bandages wrapped around RLE     Activity Tolerance  Endurance: Tolerates 10 - 20 min exercise with multiple rests    Sensation  Sensation Comment: Residual n/t in LEs from anesthesia     Static Sitting Balance  Static Sitting-Balance Support: Bilateral upper extremity supported, Feet supported  Static Sitting-Level of Assistance: Close supervision  Static Standing Balance  Static Standing-Balance Support: Bilateral upper extremity supported  Static Standing-Level of Assistance: Contact guard  Dynamic Standing Balance  Dynamic Standing-Balance Support:  Bilateral upper extremity supported  Dynamic Standing-Level of Assistance: Contact guard    Functional Assessments:     Bed Mobility  Bed Mobility: Yes  Bed Mobility 1  Bed Mobility 1: Supine to sitting, Scooting  Level of Assistance 1: Contact guard  Bed Mobility Comments 1: HOB elevated    Transfers  Transfer: Yes  Transfer 1  Transfer From 1: Bed to  Transfer to 1: Stand  Technique 1: Sit to stand  Transfer Device 1: Walker, Gait belt  Transfer Level of Assistance 1: Contact guard, Minimal verbal cues  Trials/Comments 1: VCs for proper hand placement and body mechanics.  Transfers 2  Transfer From 2: Stand to, Toilet to  Transfer to 2: Toilet, Stand  Technique 2: Sit to stand, Stand to sit  Transfer Device 2: Walker, Gait belt  Transfer Level of Assistance 2: Minimum assistance, Minimal verbal cues  Trials/Comments 2: Pt only required min A as toilet at low height, VCs for proper body mechanics, negotiating small space with walker and hand placement  Transfers 3  Transfer From 3: Stand to  Transfer to 3: Chair with arms  Technique 3: Stand to sit  Transfer Device 3: Walker, Gait belt  Transfer Level of Assistance 3: Contact guard, Minimal verbal cues  Trials/Comments 3: VCs for proper hand placement and body mechanics.    Ambulation/Gait Training  Ambulation/Gait Training Performed: Yes  Ambulation/Gait Training 1  Surface 1: Level tile  Device 1: Rolling walker  Gait Support Devices: Gait belt  Assistance 1: Contact guard, Minimal verbal cues  Quality of Gait 1: Diminished heel strike (decreased eusebia, insufficient foot clearance, decreased stance time on RLE)  Comments/Distance (ft) 1: x2 trials with reciprocal gait pattern; ~40 ft then seated rest break on toilet; ~10 ft (VCs for safety awareness with FWW and to avoid pivoting/twisting on RLE with directional turns)  Stairs  Stairs:  (Pt will benefit from stair training for safe home going)     Extremity/Trunk Assessments:  RUE   RUE : Within Functional  Limits  LUE   LUE: Within Functional Limits  RLE   RLE :  (Limited knee AROM 2/2 recent surgery. Strength WFL based on functional mobility)  LLE   LLE : Within Functional Limits    Treatments:  Therapeutic Exercise  Therapeutic Exercise Performed: Yes  Therapeutic Exercise Activity 1: Pt educated on HEP consisting of various LE exercises to maintain/improve overall LE strength and ROM with emphasis on surgical side (RLE)  Therapeutic Exercise Activity 2: Supine: AP x20 BL, QS x10, GS x10    Multiple sit <> stand transfers from various surfaces (bed, toilet, chair) with FWW CGA-Davida  Gait training x2 trials with FWW CGA  See functional assessment for details    Outcome Measures:  [facility] Basic Mobility  Turning from your back to your side while in a flat bed without using bedrails: A little  Moving from lying on your back to sitting on the side of a flat bed without using bedrails: A little  Moving to and from bed to chair (including a wheelchair): A little  Standing up from a chair using your arms (e.g. wheelchair or bedside chair): A little  To walk in hospital room: A little  Climbing 3-5 steps with railing: Total  Basic Mobility - Total Score: 16     Goals:  Encounter Problems       Encounter Problems (Active)       Mobility       Pt will be able to ambulate >/= 100 ft with FWW SBA with good safety awareness, stability and reciprocal gait pattern.        Start:  03/28/25    Expected End:  04/11/25            Pt will complete supine, seated and standing exercises to maintain/improve overall strength with minimal verbal cues.         Start:  03/28/25    Expected End:  04/11/25            Pt will be able to negotiate ascending/descending 4 STAS with use of unilateral HR CGA with sufficient foot clearance and good safety awareness for safe home going.         Start:  03/28/25    Expected End:  04/11/25               PT Transfers       Pt will be able to complete all bed mobility tasks mod I.        Start:  03/28/25     Expected End:  04/11/25            Pt will be able to complete all transfers with FWW SBA demonstrating good safety awareness and proper body mechanics.        Start:  03/28/25    Expected End:  04/11/25                 Education Documentation  Precautions, taught by Beverly Tineo PT at 3/28/2025  4:25 PM.  Learner: Family, Patient  Readiness: Acceptance  Method: Explanation  Response: Verbalizes Understanding, Demonstrated Understanding, Needs Reinforcement    Body Mechanics, taught by Beverly Tineo PT at 3/28/2025  4:25 PM.  Learner: Family, Patient  Readiness: Acceptance  Method: Explanation  Response: Verbalizes Understanding, Demonstrated Understanding, Needs Reinforcement    Home Exercise Program, taught by Beverly Tineo PT at 3/28/2025  4:25 PM.  Learner: Family, Patient  Readiness: Acceptance  Method: Explanation  Response: Verbalizes Understanding, Demonstrated Understanding, Needs Reinforcement    Mobility Training, taught by Beverly Tineo PT at 3/28/2025  4:25 PM.  Learner: Family, Patient  Readiness: Acceptance  Method: Explanation  Response: Verbalizes Understanding, Demonstrated Understanding, Needs Reinforcement    Education Comments  No comments found.

## 2025-03-28 NOTE — ANESTHESIA POSTPROCEDURE EVALUATION
Patient: Keiry Kline    Procedure Summary       Date: 03/28/25 Room / Location: [facility] OR 02 / [facility] OR    Anesthesia Start: 1041 Anesthesia Stop: 1237    Procedure: RIGHT KNEE ARTHROPLASTY (Right: Knee) Diagnosis:       Unilateral primary osteoarthritis, right knee      (Unilateral primary osteoarthritis, right knee [M17.11])    Surgeons: Carlitos Gallagher MD Responsible Provider: Garfield Phipps DO    Anesthesia Type: regional, MAC, spinal ASA Status: 3            Anesthesia Type: regional, MAC, spinal    Vitals Value Taken Time   /75 03/28/25 1237   Temp 36.6 03/28/25 1237   Pulse 99 03/28/25 1237   Resp 18 03/28/25 1237   SpO2 96 03/28/25 1237       Anesthesia Post Evaluation    Patient location during evaluation: PACU  Patient participation: complete - patient participated  Level of consciousness: sleepy but conscious  Pain management: adequate  Multimodal analgesia pain management approach  Airway patency: patent  Cardiovascular status: acceptable, hemodynamically stable and stable  Respiratory status: acceptable and face mask  Hydration status: acceptable  Postoperative Nausea and Vomiting: none      No notable events documented.

## 2025-03-28 NOTE — ANESTHESIA PROCEDURE NOTES
Spinal Block    Patient location during procedure: OR  Start time: 3/28/2025 10:44 AM  End time: 3/28/2025 10:49 AM  Reason for block: primary anesthetic  Staffing  Performed: CRNA and attending   Authorized by: Garfield Phipps DO    Performed by: JESÚS Gonzalez-CRNA    Preanesthetic Checklist  Completed: patient identified, IV checked, site marked, risks and benefits discussed, surgical consent, monitors and equipment checked, pre-op evaluation, timeout performed and sterile techniques followed  Block Timeout  RN/Licensed healthcare professional reads aloud to the Anesthesia provider and entire team: Patient identity, procedure with side and site, patient position, and as applicable the availability of implants/special equipment/special requirements.  Patient on coagulant treatment: no  Timeout performed at: 3/28/2025 10:43 AM  Spinal Block  Patient position: sitting  Prep: ChloraPrep  Sterility prep: cap, drape, gloves, hand hygiene and mask  Sedation level: light sedation  Patient monitoring: blood pressure, continuous pulse oximetry and heart rate  Approach: midline  Vertebral space: L3-4  Injection technique: single-shot  Needle  Needle type: Pencan.   Needle gauge: 25 G  Free flowing CSF: yes    Assessment  Sensory level: T10 bilateral  Block outcome: patient comfortable  Procedure assessment: patient sedated but conversant throughout procedure  Additional Notes  Pt to room, BP and pulse ox placed.  Prepped and draped in sitting position.  Skin numbed with 2cc of 1% lido.  Easy insertion of introducer and spinal needle.  Free flowing CSF with no heme and no c/o paresthesia.  Easy injection of 1.4cc of 0.75% bupi. Pt tolerated procedure well with no complaints.

## 2025-03-28 NOTE — DISCHARGE INSTRUCTIONS
"    Knee Replacement Discharge Instructions:   Dr. Carlitos Gallagher    Physical Therapy / Range of Motion:     Physical therapy should be done daily after surgery.     -  Therapy will commonly begin at home and progress to an outpatient setting (ideally around 2 weeks from surgery).  Despite the convenience of home therapy, Dr. Gallagher recommends starting outpatient therapy as soon as possible after surgery.      -  If you find that you are not receiving the appropriate amount of therapy (either at home or at a facility) please call our office immediately.  Therapy CANNOT be postponed or delayed!          *** Knee replacements should ideally achieve 90 degrees of flexion by 2 weeks from surgery.  Ask your therapist after each session \"What is my RANGE OF MOTION?\"  Your physician will be asking you this question at your follow up appointments.  It is also important to work on getting the knee straight and at rest attempt to keep the knee as straight as possible.    Discharge to rehab:  If you are discharged to a SNF or rehabilitation facility, discharge to home is determined by the specific staff at the facility when they deem you are safe to return home.  Your surgeon and his staff are not allowed to make this decision.  Please inquire with the facility therapist, , and medical personnel.      Medications:   You have been prescribed medication to prevent blood clots (either aspirin or a stronger blood thinner). This medication should be continued for 4 weeks from surgery or longer if on blood thinners prior.   - In addition please wear the BRENDA hose stockings that you were given to help prevent blood clots for 2-4 weeks postoperatively, and perform calf pumps when seated.    2.    A prescription will be given to you upon discharge for a Pain Medicine.  It is common to take your pain medication one hour before scheduled physical therapy. This will allow you to achieve the necessary goals.   - If you need a " refill, please notify the office at LEAST 48 hours before you will be out of your medication.  Pain medications cannot be called in to a pharmacy and require authentication that can only be done during normal business hours (8-5 on Mon-Fri).   We cannot refill pain medications on the weekend.    3.    Prior to dental work in the future an antibiotic will be prescribed.    4.    A stool softener (Colace - over the counter - ) should be taken with the pain medication     You may receive:    5.    A muscle relaxer (Flexeril) is commonly prescribed.  This can cause some drowsiness which may assist sleeping.    6.   A medication for nausea (Zofran, etc)      Wound Care:  Leave waterproof dressing in place.  As long as the dressing is intact and occlusive at the borders, you may shower.   - Remove the dressing at 7 days post-op.    - You may reinforce with other dressing materials as needed (gauze, ACE wraps, etc.).  If drainage is noted, please contact us.    You may shower allowing water to run over the incision site when the dressing is removed if the wound is dry.  If there is any drainage contact us after dressing removal and keep dry.    - DO NOT RUB OR SCRUB INCISION. NO SOAKING IN A TUB OR STANDING WATER UNTIL INCISION IS WELL-HEALED AND SCABS HAVE DISAPPEARED.    -Do not apply any lotions, creams, ointments, peroxide, betadine or gels to incision and surrounding area.          -Apply ice to affected area as tolerated.    -Bruising is common but if any signs of infection (redness, significant warmth, drainage) please contact us    Driving:  Must be off of narcotic pain medication and have good leg control!  Approximately right le-6 weeks and left le weeks.  Please discuss with Dr. Gallagher at first post-op visit prior to resuming.     Follow-up Appointments:   Your post op appointment is ~ 3 weeks from surgery.  Please call the office if there is any uncertainty your post-op appointments.     Prior, during,  and after your surgery and hospital stay, please do not hesitate to call our office with any questions or concerns.    Our staff will be happy to assist you in any possible way during your personal journey through joint replacement.     [phone]

## 2025-03-28 NOTE — ANESTHESIA PREPROCEDURE EVALUATION
Patient: Keiry Kline    Procedure Information       Date/Time: 03/28/25 1035    Procedure: RIGHT KNEE ARTHROPLASTY (Right: Knee) - 23HR OBSERVATION/BEDDED OPS    Location: [facility] OR  / [facility] OR    Surgeons: Carlitos Gallagher MD            Relevant Problems   Cardiac   (+) Primary hypertension      Endocrine   (+) Class 2 severe obesity due to excess calories with serious comorbidity and body mass index (BMI) of 38.0 to 38.9 in adult      Musculoskeletal   (+) Primary osteoarthritis of both knees   (+) Unilateral primary osteoarthritis, left knee   (+) Unilateral primary osteoarthritis, right knee      HEENT   (+) Acute non-recurrent maxillary sinusitis      GYN   (+) Menorrhagia       Clinical information reviewed:   Tobacco  Allergies  Meds   Med Hx  Surg Hx  OB Status  Fam Hx  Soc   Hx        NPO Detail:  NPO/Void Status  Carbohydrate Drink Given Prior to Surgery? : N  Date of Last Liquid: 03/27/25  Time of Last Liquid: 2300  Date of Last Solid: 03/27/25  Time of Last Solid: 2300  Last Intake Type: Food  Time of Last Void: 0700         Physical Exam    Airway  Mallampati: II  TM distance: >3 FB  Neck ROM: full     Cardiovascular - normal exam     Dental    Pulmonary - normal exam     Abdominal - normal exam             Anesthesia Plan    History of general anesthesia?: yes  History of complications of general anesthesia?: no    ASA 3     regional, MAC and spinal     intravenous induction   Anesthetic plan and risks discussed with patient.  Use of blood products discussed with patient who.    Plan discussed with CRNA.

## 2025-03-28 NOTE — OP NOTE
Operative Note     Date: 3/28/2025  OR Location: [facility]    Name: Keiry Kline, : 1980, Age: 45 y.o., MRN: [id number], Sex: female    Diagnosis  Pre-op Diagnosis      * Unilateral primary osteoarthritis, right knee [M17.11] Post-op Diagnosis     * Unilateral primary osteoarthritis, right knee [M17.11]     Procedures  Right total knee arthroplasty     Surgeons      * Carlitos Gallagher - Primary    Resident/Fellow/Other Assistant:  Prashanth Alvaerz PA-C     Staff:   Circulator: Solange Deng Person: Angela Ritchie Scrub: Jennifer    Anesthesia Staff: Anesthesiologist: Garfield Phipps DO  CRNA: JESÚS Gonzalez-CRNA    Procedure Summary  Anesthesia: Anesthesia type not filed in the log.  ASA: III  Estimated Blood Loss: 50 mL  Intra-op Medications:   Administrations occurring from 1035 to 1240 on 25:   Medication Name Total Dose   ropivacaine-epinephrine-clonidine-ketorolac 2.46-0.005- 0.0008-0.3mg/mL periarticular syringe 100 mL   bupivacaine PF (Marcaine) 0.75 % 1.4 mL   fentaNYL (Sublimaze) injection 50 mcg/mL 100 mcg   LR bolus Cannot be calculated   magnesium sulfate 50 % injection 2 g   midazolam (Versed) injection 1 mg/mL 2 mg   ondansetron (Zofran) 2 mg/mL injection 4 mg   propofol (Diprivan) injection 10 mg/mL 1,095.1 mg   ceFAZolin (Ancef) 2 g in dextrose (iso)  mL 2 g              Anesthesia Record               Intraprocedure I/O Totals          Intake    LR bolus 500.00 mL    Total Intake 500 mL          Specimen: No specimens collected                Implants:     Implants       Type Name Action Serial No.      Joint Knee CEMENT, BONE, BIOMET R, 1X40 - [id number] Implanted      Joint SCREW, FEMALE 25MM, PSN 2.5MM - [id number] Used, Not Implanted      Joint SCREW, FEMALE 25MM, PSN 2.5MM - [id number] Used, Not Implanted      Joint Knee PATELLA, ALL POLY VE, 35MM - [id number] Implanted       PERSONA THE PERSONALIZED KNEE SYSTEM, 0 DEGREE RIGHT SIZE D, CEMENTED, TIBIA Implanted        SIZE 8, PERSONA CR PERSONALIZED KNEE FEMUR, CEMENTED, TI-NIDIUM TIVANIUM NITRATED, STANDARD, RIGHT Implanted       RIGHT, 10MM 8-9/CD, PERSONA MEDIAL CONGRUENT ARTICULATING SURFACE, TIBIAL INSERT, VIVACIT-E Implanted               Findings: see procedure details    Indications:     The patient was seen in the preoperative area. The risks, benefits, complications, treatment options, non-operative alternatives, expected recovery and outcomes were discussed with the patient. The possibilities of reaction to medication, pulmonary aspiration, injury to surrounding structures, bleeding, recurrent infection, the need for additional procedures, failure to diagnose a condition, and creating a complication requiring transfusion or operation were discussed with the patient. The patient concurred with the proposed plan, giving informed consent.  The site of surgery was properly noted/marked if necessary per policy. The patient has been actively warmed in preoperative area. Preoperative antibiotics have been ordered and given within 1 hours of incision. Venous thrombosis prophylaxis have been ordered.    The patient failed multiple attempts at non-surgical management.  Specific to TKA we discussed the risks of infection / revision surgery, DVT/PE, medical complications, stiffness / loss of motion, neurologic (foot drop) or vascular injury.    Procedure Details:     Patient was met prior to surgery in pre-operative holding.  The appropriate extremity was marked and recent health status was reviewed and we found no contraindication to proceeding with the scheduled procedure.  We again reviewed the risks of infection, wound issues, deep venous thrombosis, pulmonary embolism, medical complications including cardiac and pulmonary, neurologic and vascular injury and incomplete relief of pre-operative pain.    The patient discussed regional anesthesia in pre-op holding.  The patient was transported to the operating room.  A thigh  tourniquet was placed and a small bump on the buttock.  The patient was resting comfortably in a supine position with all anderson prominences well padded.  Sequential compression device was placed on the contralateral lower extremity.  An exam under anesthesia was performed to evaluate the patient´s range of motion and ligamentous integrity.    The patient was prepped and draped in the usual sterile fashion.  The leg was placed in a well padded leg merlos.  After prep, drape, antibiotic and time out, the leg was exsanguinated and the tourniquet inflated.  A longitudinal incision was made over the anterior knee.  The dissection was carried out through the skin and subcutaneous tissue.  A medial parapatellar arthrotomy was performed.  An effusion and synovitis was noted compatible with the grade IV changes of the articular cartilage.  The fat pad was slightly de-bulked, Braintree´s line was marked and some of the deep medial collateral ligament was released from the tibia.  The ACL was resected. The knee was flexed up and medial and lateral retractors were placed to protect the collateral ligaments and popliteus tendon.      Due to the degree of deformity and stiffness, the posterior cruciate ligament was resected.    A canal finder reamer was utilized to gain entry into the femur.  An intramedullary cierra was placed by hand and set to 5 degrees of valgus for the distal femoral cut.  The cutting block was placed and the distal femoral cut was performed.   A sizing guide was then placed and the femoral size was measured based on posterior referencing.  The 4-in-1 cutting block was placed set to 3 degrees of external rotation.  The rotation was confirmed based on the transepicondylar axis and Braintree´s line.  There was no significant hypoplasia of the posterior condyles.     Once the cutting block was placed the cuts were performed: anterior, posterior and chamfer cuts.  There collaterals were protected and the bone was  removed.  There was no notching of the femur.   Once the femoral cuts were complete, we turned our attention to the tibia.  Retractors were placed medial, lateral and posteriorly.  An extramedullary alignment cierra was used and the slope was set in accordance with the implant.  We measured 2 mm of resection from the lowest point on the tibia.  The tibial cut was completed with care not to encroach posterior to the knee joint.    After the tibial cut was completed, we removed the remaining menisci.  Using a curved osteotome posterior osteophytes were carefully removed from the femur.  We then assessed the flexion and extension gaps using trial spacer blocks.    The gaps appeared symmetric with a spacer and we proceeded to placing trial implants.  With symmetric gaps we progressed to placing trial implants.  The final poly was a 10 MC.    A trial tibial component was placed with care to avoid overhang.  The rotation was set in line with the medial third of the tibial tubercle.  A trial femoral component was then placed, followed by a trial articular surface.  The knee was taken through range of motion with the provisional components.  The flexion and extension gaps were evaluated, as well as the patellar tracking and mid-flexion stability.  The following soft tissue balancing, recuts, and/or modifications were required: release of additional deep MCL off the tibia.      The patella was everted and ~9 mm of bone were removed from the thickest portion using a clamp/cutting guide.  The lug holes were drilled in the patella and femur.  The tibia was prepared with the drill and broach after confirmation of alignment using a drop cierra.   The sclerotic areas of the bone were drilled using a small drill bit.  The capsule around the knee was injected using a long acting local anesthetic / multimodal pain mixture.    The cement was mixed in a vacuum sealed fashion while the bone was pulsatile lavaged.   The bone was dried and the  implants were placed with a gentle posterior directed force and a clamp on the patella.  The excess cement was removed.   After the cement dried the gap balancing was reassessed prior to placing the final articular surface.  The proud cement mantle was removed using a small osteotome.  The knee was then copiously lavaged with sterile saline and Irrisept (0.05% chlorhexidine gluconate).  The tourniquet was taken down and hemostasis was obtained using Bovie electrocautery and tranexamic acid (per protocol).  No significant bleeders were encountered and the usage of a drain was not felt to be necessary.    A layered closure was performed using 1 Vicryl and 1 Stratafix in the retinacular layer and quadriceps tendon.  2-0 vicryl in the deep dermal layer and monofilament in the skin.  An adherent, water proof dressing was placed followed by Webril and an ace bandage. All counts were reported as correct.  The patient was stable to the post anesthesia care unit.    I was present and participated in the entire procedure. The Physician Assistant participated in all critical elements of the procedure under my direct supervision. The surgical incision was closed by the PA under my indirect supervision. There were no qualified residents available to assist.    The physician assistant was present for the entire case.  Given the nature of the procedure and disease process, a skilled surgical assistant was necessary for the case.  The assistant was necessary for retraction and helped directly facilitate completion of the surgery.  A certified scrub tech was at the back table managing instruments and supplies for the surgical procedure.    Complications:  None; patient tolerated the procedure well.    Disposition: PACU - hemodynamically stable.  Condition: stable         Carlitos Gallagher  Phone Number: [phone]

## 2025-03-29 ENCOUNTER — DOCUMENTATION (OUTPATIENT)
Dept: HOME HEALTH SERVICES | Facility: HOME HEALTH | Age: 45
End: 2025-03-29
Payer: COMMERCIAL

## 2025-03-29 ENCOUNTER — HOME HEALTH ADMISSION (OUTPATIENT)
Dept: HOME HEALTH SERVICES | Facility: HOME HEALTH | Age: 45
End: 2025-03-29
Payer: COMMERCIAL

## 2025-03-29 VITALS
HEIGHT: 63 IN | TEMPERATURE: 98.6 F | WEIGHT: 205 LBS | DIASTOLIC BLOOD PRESSURE: 74 MMHG | SYSTOLIC BLOOD PRESSURE: 126 MMHG | BODY MASS INDEX: 36.32 KG/M2 | HEART RATE: 76 BPM | RESPIRATION RATE: 20 BRPM | OXYGEN SATURATION: 99 %

## 2025-03-29 LAB
ANION GAP SERPL CALC-SCNC: 10 MMOL/L (ref 10–20)
BUN SERPL-MCNC: 9 MG/DL (ref 6–23)
CALCIUM SERPL-MCNC: 8.6 MG/DL (ref 8.6–10.3)
CHLORIDE SERPL-SCNC: 105 MMOL/L (ref 98–107)
CO2 SERPL-SCNC: 26 MMOL/L (ref 21–32)
CREAT SERPL-MCNC: 0.79 MG/DL (ref 0.5–1.05)
EGFRCR SERPLBLD CKD-EPI 2021: >90 ML/MIN/1.73M*2
ERYTHROCYTE [DISTWIDTH] IN BLOOD BY AUTOMATED COUNT: 13.4 % (ref 11.5–14.5)
GLUCOSE SERPL-MCNC: 140 MG/DL (ref 74–99)
HCT VFR BLD AUTO: 35.9 % (ref 36–46)
HGB BLD-MCNC: 11.7 G/DL (ref 12–16)
MCH RBC QN AUTO: 30.7 PG (ref 26–34)
MCHC RBC AUTO-ENTMCNC: 32.6 G/DL (ref 32–36)
MCV RBC AUTO: 94 FL (ref 80–100)
NRBC BLD-RTO: 0 /100 WBCS (ref 0–0)
PLATELET # BLD AUTO: 250 X10*3/UL (ref 150–450)
POTASSIUM SERPL-SCNC: 4.5 MMOL/L (ref 3.5–5.3)
RBC # BLD AUTO: 3.81 X10*6/UL (ref 4–5.2)
SODIUM SERPL-SCNC: 136 MMOL/L (ref 136–145)
WBC # BLD AUTO: 10.6 X10*3/UL (ref 4.4–11.3)

## 2025-03-29 PROCEDURE — 2500000004 HC RX 250 GENERAL PHARMACY W/ HCPCS (ALT 636 FOR OP/ED): Performed by: ORTHOPAEDIC SURGERY

## 2025-03-29 PROCEDURE — 36415 COLL VENOUS BLD VENIPUNCTURE: CPT | Performed by: ORTHOPAEDIC SURGERY

## 2025-03-29 PROCEDURE — 2500000002 HC RX 250 W HCPCS SELF ADMINISTERED DRUGS (ALT 637 FOR MEDICARE OP, ALT 636 FOR OP/ED): Performed by: ORTHOPAEDIC SURGERY

## 2025-03-29 PROCEDURE — 97116 GAIT TRAINING THERAPY: CPT | Mod: GP

## 2025-03-29 PROCEDURE — 2500000001 HC RX 250 WO HCPCS SELF ADMINISTERED DRUGS (ALT 637 FOR MEDICARE OP): Performed by: PHYSICIAN ASSISTANT

## 2025-03-29 PROCEDURE — 2500000002 HC RX 250 W HCPCS SELF ADMINISTERED DRUGS (ALT 637 FOR MEDICARE OP, ALT 636 FOR OP/ED)

## 2025-03-29 PROCEDURE — 97165 OT EVAL LOW COMPLEX 30 MIN: CPT | Mod: GO | Performed by: OCCUPATIONAL THERAPIST

## 2025-03-29 PROCEDURE — 7100000011 HC EXTENDED STAY RECOVERY HOURLY - NURSING UNIT

## 2025-03-29 PROCEDURE — 2500000001 HC RX 250 WO HCPCS SELF ADMINISTERED DRUGS (ALT 637 FOR MEDICARE OP): Performed by: ORTHOPAEDIC SURGERY

## 2025-03-29 PROCEDURE — 97110 THERAPEUTIC EXERCISES: CPT | Mod: GP

## 2025-03-29 PROCEDURE — 82374 ASSAY BLOOD CARBON DIOXIDE: CPT | Performed by: ORTHOPAEDIC SURGERY

## 2025-03-29 PROCEDURE — 96372 THER/PROPH/DIAG INJ SC/IM: CPT | Performed by: ORTHOPAEDIC SURGERY

## 2025-03-29 PROCEDURE — 99238 HOSP IP/OBS DSCHRG MGMT 30/<: CPT | Performed by: PHYSICIAN ASSISTANT

## 2025-03-29 PROCEDURE — 85027 COMPLETE CBC AUTOMATED: CPT | Performed by: ORTHOPAEDIC SURGERY

## 2025-03-29 RX ORDER — OXYCODONE HYDROCHLORIDE 5 MG/1
5 TABLET ORAL EVERY 6 HOURS PRN
Qty: 28 TABLET | Refills: 0 | Status: SHIPPED | OUTPATIENT
Start: 2025-03-29 | End: 2025-04-05

## 2025-03-29 RX ORDER — ONDANSETRON 4 MG/1
4 TABLET, ORALLY DISINTEGRATING ORAL EVERY 8 HOURS PRN
Qty: 9 TABLET | Refills: 0 | Status: SHIPPED | OUTPATIENT
Start: 2025-03-29

## 2025-03-29 RX ORDER — DOCUSATE SODIUM 100 MG/1
100 CAPSULE, LIQUID FILLED ORAL 2 TIMES DAILY
Qty: 20 CAPSULE | Refills: 0 | Status: SHIPPED | OUTPATIENT
Start: 2025-03-29 | End: 2025-04-08

## 2025-03-29 RX ADMIN — TRANEXAMIC ACID 1950 MG: 650 TABLET ORAL at 06:07

## 2025-03-29 RX ADMIN — KETOROLAC TROMETHAMINE 15 MG: 15 INJECTION, SOLUTION INTRAMUSCULAR; INTRAVENOUS at 12:10

## 2025-03-29 RX ADMIN — OXYBUTYNIN CHLORIDE 5 MG: 5 TABLET ORAL at 08:39

## 2025-03-29 RX ADMIN — ACETAMINOPHEN 650 MG: 325 TABLET ORAL at 12:10

## 2025-03-29 RX ADMIN — OXYCODONE HYDROCHLORIDE 10 MG: 10 TABLET ORAL at 12:58

## 2025-03-29 RX ADMIN — CYCLOBENZAPRINE 10 MG: 10 TABLET, FILM COATED ORAL at 00:03

## 2025-03-29 RX ADMIN — CEFAZOLIN SODIUM 2 G: 2 INJECTION, SOLUTION INTRAVENOUS at 04:42

## 2025-03-29 RX ADMIN — OXYCODONE HYDROCHLORIDE 10 MG: 10 TABLET ORAL at 08:39

## 2025-03-29 RX ADMIN — ACETAMINOPHEN 650 MG: 325 TABLET ORAL at 00:03

## 2025-03-29 RX ADMIN — KETOROLAC TROMETHAMINE 15 MG: 15 INJECTION, SOLUTION INTRAMUSCULAR; INTRAVENOUS at 04:42

## 2025-03-29 RX ADMIN — DOCUSATE SODIUM 100 MG: 100 CAPSULE, LIQUID FILLED ORAL at 08:39

## 2025-03-29 RX ADMIN — MORPHINE SULFATE 2 MG: 2 INJECTION, SOLUTION INTRAMUSCULAR; INTRAVENOUS at 01:53

## 2025-03-29 RX ADMIN — OXYCODONE HYDROCHLORIDE 10 MG: 10 TABLET ORAL at 02:41

## 2025-03-29 RX ADMIN — ACETAMINOPHEN 650 MG: 325 TABLET ORAL at 06:07

## 2025-03-29 RX ADMIN — ENOXAPARIN SODIUM 40 MG: 40 INJECTION SUBCUTANEOUS at 08:40

## 2025-03-29 ASSESSMENT — COGNITIVE AND FUNCTIONAL STATUS - GENERAL
WALKING IN HOSPITAL ROOM: A LITTLE
DAILY ACTIVITIY SCORE: 20
DRESSING REGULAR LOWER BODY CLOTHING: A LITTLE
HELP NEEDED FOR BATHING: A LITTLE
PERSONAL GROOMING: A LITTLE
CLIMB 3 TO 5 STEPS WITH RAILING: A LITTLE
MOBILITY SCORE: 24
TOILETING: A LITTLE
MOBILITY SCORE: 22

## 2025-03-29 ASSESSMENT — PAIN SCALES - GENERAL
PAINLEVEL_OUTOF10: 9
PAINLEVEL_OUTOF10: 9
PAINLEVEL_OUTOF10: 8
PAINLEVEL_OUTOF10: 8
PAINLEVEL_OUTOF10: 6
PAINLEVEL_OUTOF10: 6
PAINLEVEL_OUTOF10: 7
PAINLEVEL_OUTOF10: 8
PAINLEVEL_OUTOF10: 8

## 2025-03-29 ASSESSMENT — PAIN DESCRIPTION - ORIENTATION
ORIENTATION: RIGHT

## 2025-03-29 ASSESSMENT — PAIN - FUNCTIONAL ASSESSMENT
PAIN_FUNCTIONAL_ASSESSMENT: 0-10

## 2025-03-29 ASSESSMENT — PAIN DESCRIPTION - LOCATION
LOCATION: KNEE

## 2025-03-29 NOTE — PROGRESS NOTES
"Keiry Kline is a 45 y.o. female  presenting with Unilateral primary osteoarthritis, right knee.    Subjective   Ms. Kline struggled with right knee pain, rated mod to severe overnight. Her pain is now rated 5/10.  She is tolerating oral intake and is voiding without issue.  She is looking forward to going home upon discharge.       Objective     Physical Exam  Vitals reviewed.   HENT:      Head: Normocephalic.      Mouth/Throat:      Mouth: Mucous membranes are moist.      Pharynx: Oropharynx is clear.   Eyes:      Extraocular Movements: Extraocular movements intact.   Cardiovascular:      Rate and Rhythm: Normal rate.   Pulmonary:      Effort: Pulmonary effort is normal.   Abdominal:      Palpations: Abdomen is soft.   Musculoskeletal:      Comments: Right knee dressing is dry and intact.  light touch sensation is intact, ankle dorsiflexion/plantar flexion is intact. DP 2+/2 palpable     Skin:     General: Skin is warm and dry.      Capillary Refill: Capillary refill takes less than 2 seconds.   Neurological:      General: No focal deficit present.      Mental Status: She is alert. Mental status is at baseline.   Psychiatric:         Mood and Affect: Mood normal.         Last Recorded Vitals  Blood pressure 117/64, pulse 71, temperature 36.5 °C (97.7 °F), temperature source Temporal, resp. rate 16, height 1.6 m (5' 3\"), weight 93 kg (205 lb), SpO2 97%.  Intake/Output last 3 Shifts:  I/O last 3 completed shifts:  In: 2418.3 (26 mL/kg) [P.O.:345; I.V.:473.3 (5.1 mL/kg); IV Piggyback:1600]  Out: 50 (0.5 mL/kg) [Blood:50]  Weight: 93 kg     Relevant Results      Scheduled medications  acetaminophen, 650 mg, oral, q6h NAHID  atorvastatin, 10 mg, oral, Daily  docusate sodium, 100 mg, oral, BID  enoxaparin, 40 mg, subcutaneous, Daily  ketorolac, 15 mg, intravenous, q6h  oxybutynin, 5 mg, oral, TID  valsartan, 80 mg, oral, Daily      Continuous medications  lactated Ringer's, 50 mL/hr, Last Rate: Stopped (03/29/25 " 0100)  oxygen, 2 L/min, Last Rate: Stopped (03/28/25 1452)      PRN medications  PRN medications: benzocaine-menthol, bisacodyl, cyclobenzaprine, HYDROmorphone, morphine, naloxone, ondansetron ODT **OR** ondansetron, oxyCODONE, oxyCODONE, prochlorperazine **OR** prochlorperazine **OR** prochlorperazine  Results for orders placed or performed during the hospital encounter of 03/28/25 (from the past 24 hours)   CBC   Result Value Ref Range    WBC 10.6 4.4 - 11.3 x10*3/uL    nRBC 0.0 0.0 - 0.0 /100 WBCs    RBC 3.81 (L) 4.00 - 5.20 x10*6/uL    Hemoglobin 11.7 (L) 12.0 - 16.0 g/dL    Hematocrit 35.9 (L) 36.0 - 46.0 %    MCV 94 80 - 100 fL    MCH 30.7 26.0 - 34.0 pg    MCHC 32.6 32.0 - 36.0 g/dL    RDW 13.4 11.5 - 14.5 %    Platelets 250 150 - 450 x10*3/uL   Basic metabolic panel   Result Value Ref Range    Glucose 140 (H) 74 - 99 mg/dL    Sodium 136 136 - 145 mmol/L    Potassium 4.5 3.5 - 5.3 mmol/L    Chloride 105 98 - 107 mmol/L    Bicarbonate 26 21 - 32 mmol/L    Anion Gap 10 10 - 20 mmol/L    Urea Nitrogen 9 6 - 23 mg/dL    Creatinine 0.79 0.50 - 1.05 mg/dL    eGFR >90 >60 mL/min/1.73m*2    Calcium 8.6 8.6 - 10.3 mg/dL                   Assessment/Plan   Assessment & Plan  Unilateral primary osteoarthritis, right knee  POD #1 s/p right TKA  Continue PT/OT, WBAT right  LE  Reviewed am labs  Multimodal pain regimen  knee dressing to be removed on post op day #7  VTE prophylaxis: eliquis 2.5 mg BID X 30 days  Will discharge home when [facility] with [facility]  Follow up with Dr. Gallagher as directed    Total knee replacement status, right           I spent 25 minutes in the professional and overall care of this patient.      Maria Esther Gr PA-C

## 2025-03-29 NOTE — DISCHARGE SUMMARY
Discharge summary      This patient Keiry Kline was admitted to the hospital on 3/28/2025  after undergoing Procedure(s) (LRB):  RIGHT KNEE ARTHROPLASTY (Right) without complications.      No significant or unexpected findings or abnormal ortho imaging were noted during the hospital stay    Hospital course      Patient tolerated surgical procedure well and there was no complications. Patient progressed adequately through their recovery during hospital stay including PT and rehabilitation.    Patient was then D/C on 3/29/2025 to home  in stable condition.  Patient was instructed on the use of pain medications, the signs and symptoms of infection, and was given our number to call should they have any questions or concerns following discharge.    Based on my clinical judgment, the patient was provided with a 7-day prescription for opioid medication at 30 MED, indicated for treatment of acute pain in the setting of recent right TKA. OARRS report was run and has demonstrated an appropriate time course.  The patient has been provided with counseling pertaining to safe use of opioid medication.    Pertinent Physical Exam At Time of Discharge  Alert, oriented x 3, cooperative with examination.     Examination of the right lower  extremity reveals right knee  dressing is intact without drainage.  No antonia-incisional ecchymosis.  EHL, plantarflexion, dorsiflexion are 4+/5.  Able to isometrically fire right quadriceps.  Sensory intact light touch in the deep/superficial/common peroneal, saphenous, tibial, sural nerve distributions.  DP and popliteal pulses are 2+ with capillary refill less than 2 seconds.  Negative Homans' sign.      Home Medications     Medication List      START taking these medications     apixaban 2.5 mg tablet; Commonly known as: Eliquis; Take 1 tablet (2.5   mg) by mouth 2 times a day.; Replaces: apixaban 5 mg (74 tabs) tablet   docusate sodium 100 mg capsule; Commonly known as: Colace; Take 1    capsule (100 mg) by mouth 2 times a day for 10 days.   ondansetron ODT 4 mg disintegrating tablet; Commonly known as:   Zofran-ODT; Dissolve 1 tablet (4 mg) in the mouth every 8 hours if needed   for nausea or vomiting.   oxyCODONE 5 mg immediate release tablet; Commonly known as: Roxicodone;   Take 1 tablet (5 mg) by mouth every 6 hours if needed for moderate pain (4   - 6) or severe pain (7 - 10) for up to 7 days. Do not take any other   narcotic medications while you are taking this medicine     CONTINUE taking these medications     acetaminophen 325 mg tablet; Commonly known as: Tylenol   aspirin-acetaminophen-caffeine 250-250-65 mg tablet; Commonly known as:   Excedrin Migraine   atorvastatin 10 mg tablet; Commonly known as: Lipitor; Take 1 tablet (10   mg) by mouth once daily.   solifenacin 10 mg tablet; Commonly known as: VESIcare   valsartan 80 mg tablet; Commonly known as: Diovan; Take 1 tablet (80 mg)   by mouth once daily.     STOP taking these medications     apixaban 5 mg (74 tabs) tablet; Commonly known as: Eliquis; Replaced by:   apixaban 2.5 mg tablet   cyclobenzaprine 10 mg tablet; Commonly known as: Flexeril   drospirenone (contraceptive) 4 mg (28) tablet   traMADol 50 mg tablet; Commonly known as: Ultram           Patient may bear weight as tolerated  to operative extremity with use of walker for assistance with ambulation   Surgical dressing to be removed pod7 and incision left open to air  Eliquis 2.5mg BID for DVT prophylaxis started on 03/29/25 and to be taken for 30  days  Follow up with surgeon in 2 weeks      Outpatient Follow-Up  Future Appointments   Date Time Provider Department Center   3/30/2025 To Be Determined Viky Glover, PT [facility]   4/1/2025 To Be Determined Jenny Burns, OT [facility]   4/11/2025 10:15 AM Carlitos Gallagher MD [id number] [place]         Maria Esther Gr PA-C

## 2025-03-29 NOTE — HH CARE COORDINATION
Home Care received a Referral for Physical Therapy and Occupational Therapy. We have processed the referral for a Start of Care on 3/30/25.     If you have any questions or concerns, please feel free to contact us at [phone]. Follow the prompts, enter your five digit zip code, and you will be directed to your care team on CENT[facility] 2.

## 2025-03-29 NOTE — PROGRESS NOTES
Physical Therapy    Physical Therapy Treatment    Patient Name: Keiry Kline  MRN: [id number]  Today's Date: 3/29/2025  Time Calculation  Start Time: 1002  Stop Time: 1026  Time Calculation (min): 24 min     4123/4123-A    Assessment/Plan   PT Assessment  PT Assessment Results: Decreased mobility, Decreased range of motion, Decreased strength  Rehab Prognosis: Good  Evaluation/Treatment Tolerance: Patient tolerated treatment well  Medical Staff Made Aware: Yes  Strengths: Access to adaptive/assistive products, Capable of completing ADLs semi/independent, Support and attitude of living partners, Support of extended family/friends, Premorbid level of function  End of Session Communication: Bedside nurse  Assessment Comment: did well this a.m..  Wants home asap.  End of Session Patient Position: Up in chair, Alarm off, not on at start of session  PT Plan  Inpatient/Swing Bed or Outpatient: Inpatient  PT Plan  Treatment/Interventions: Gait training, Stair training, Transfer training, Therapeutic exercise  PT Plan: Ongoing PT  PT Frequency: BID  PT Discharge Recommendations: Low intensity level of continued care  Equipment Recommended upon Discharge: Wheeled walker, Straight cane  PT Recommended Transfer Status: Stand by assist, Assist x1  PT - OK to Discharge: Yes (Discharge orders written, by MD.)    Current Problem:  Patient Active Problem List   Diagnosis    Dyslipidemia    Effusion of left ankle    Elevated blood pressure reading    Fatigue    Hypomagnesemia    Left wrist tendinitis    Menorrhagia    Myopia of both eyes    Hyperactivity of bladder    Overweight    Posterior tibial tendinitis of left leg    Primary osteoarthritis of both knees    Vitamin B12 deficiency    Vitamin D deficiency    Visit for screening mammogram    Prepatellar bursitis of both knees    Routine general medical examination at a health care facility    Overactive bladder    Need for immunization against influenza    Acute non-recurrent  maxillary sinusitis    Unilateral primary osteoarthritis, left knee    Total knee replacement status, left    Unilateral primary osteoarthritis, right knee    Immunity status testing    Screening-pulmonary TB    Screening examination for pulmonary tuberculosis    Screening for viral disease    Primary hypertension    Class 2 severe obesity due to excess calories with serious comorbidity and body mass index (BMI) of 38.0 to 38.9 in adult    Total knee replacement status, right       General Visit Information:   PT  Visit  PT Received On: 03/29/25  General  Reason for Referral: R TKR  Referred By: LUZ MARIA Gallagher  Past Medical History Relevant to Rehab: PT Eval 3/28  Prior to Session Communication: Bedside nurse  Patient Position Received: Up in chair, Alarm off, not on at start of session  Preferred Learning Style: verbal, visual  General Comment: OT just finished her eval. and did very well.  Hopes to go home after this PT session.  Subjective     Precautions:  Precautions  LE Weight Bearing Status: Weight Bearing as Tolerated  Medical Precautions: Fall precautions  Post-Surgical Precautions: Right total knee precautions  Precautions Comment: No pivoting.    Vital Signs:     Objective     Pain:  Pain Assessment  Pain Assessment: 0-10  0-10 (Numeric) Pain Score: 8  Pain Type: Surgical pain  Pain Location: Knee  Pain Orientation: Right  Pain Interventions: Medication (See MAR), Cold pack, Elevated, Rest    Cognition:  Cognition  Overall Cognitive Status: Within Functional Limits  Orientation Level: Oriented X4    Postural Control:  Postural Control  Postural Control: Within Functional Limits    Extremity/Trunk Assessments:        RLE   RLE : Exceptions to WFL  AROM RLE (degrees)  R Knee Flexion 0-130: 90  R Knee Extension 0-130: 10  LLE   LLE : Within Functional Limits    Activity Tolerance:  Activity Tolerance  Endurance: Endurance does not limit participation in activity    Treatments:  Therapeutic  Exercise  Therapeutic Exercise Performed:  (Heel slides x10, ball squeeze x 10, and QS x5)           Ambulation/Gait Training  Ambulation/Gait Training Performed: Yes  Ambulation/Gait Training 1  Surface 1: Level tile  Device 1: Rolling walker  Gait Support Devices: Gait belt  Assistance 1: Modified independent  Comments/Distance (ft) 1: x 60, x 200, and x 60 ft..  Transfers  Transfer: Yes  Transfer 1  Transfer From 1: Sit to, Stand to  Transfer Device 1: Walker  Transfer Level of Assistance 1: Modified independent  Trials/Comments 1: x3 trials.  Stairs  Stairs: Yes  Stairs  Rails 1: Left  Support Devices 1: Gait belt  Assistance 1: Close supervision  Comment/Number of Steps 1: up/down x 3 stairs x2.  Used a cane on 2nd trial.       Outcome Measures:        Education Documentation  Handouts, taught by Maximilian Valenzuela, PT at 3/29/2025 12:18 PM.  Learner: Patient  Readiness: Eager  Method: Demonstration, Explanation  Response: Verbalizes Understanding, Demonstrated Understanding    Precautions, taught by Maximilian Valenzuela PT at 3/29/2025 12:18 PM.  Learner: Patient  Readiness: Eager  Method: Demonstration, Explanation  Response: Verbalizes Understanding, Demonstrated Understanding    Body Mechanics, taught by Maximilian Valenzuela PT at 3/29/2025 12:18 PM.  Learner: Patient  Readiness: Eager  Method: Demonstration, Explanation  Response: Verbalizes Understanding, Demonstrated Understanding    Home Exercise Program, taught by Maximilian Valenzuela, PT at 3/29/2025 12:18 PM.  Learner: Patient  Readiness: Eager  Method: Demonstration, Explanation  Response: Verbalizes Understanding, Demonstrated Understanding    Mobility Training, taught by Maximilian Valenzuela PT at 3/29/2025 12:18 PM.  Learner: Patient  Readiness: Eager  Method: Demonstration, Explanation  Response: Verbalizes Understanding, Demonstrated Understanding    Education Comments  No comments found.           EDUCATION:     Encounter Problems       Encounter  Problems (Active)       PT Transfers       Pt will be able to complete all bed mobility tasks mod I.        Start:  03/28/25    Expected End:  04/11/25            Pt will be able to complete all transfers with FWW SBA demonstrating good safety awareness and proper body mechanics.        Start:  03/28/25    Expected End:  04/11/25               Pain - Adult             Encounter Problems (Resolved)       Mobility       Pt will be able to ambulate >/= 100 ft with FWW SBA with good safety awareness, stability and reciprocal gait pattern.  (Met)       Start:  03/28/25    Expected End:  04/11/25    Resolved:  03/29/25         Pt will complete supine, seated and standing exercises to maintain/improve overall strength with minimal verbal cues.   (Met)       Start:  03/28/25    Expected End:  04/11/25    Resolved:  03/29/25         Pt will be able to negotiate ascending/descending 4 STAS with use of unilateral HR CGA with sufficient foot clearance and good safety awareness for safe home going.   (Met)       Start:  03/28/25    Expected End:  04/11/25    Resolved:  03/29/25

## 2025-03-29 NOTE — NURSING NOTE
The writer went through discharge teaching with the patient, no questions noted after teaching. IV was removed. Vitals stable. Discharge medications were sent to preferred pharmacy. Patient was given pain meds prior to discharge.

## 2025-03-29 NOTE — PROGRESS NOTES
Occupational Therapy    Evaluation    Patient Name: Keiry Kline  MRN: [id number]  Today's Date: 3/29/2025  Time Calculation  Start Time: 0933  Stop Time: 0949  Time Calculation (min): 16 min  4123/4123-A  Eval only     Assessment  IP OT Assessment  Prognosis: Good  Medical Staff Made Aware: Yes  End of Session Communication: Bedside nurse  End of Session Patient Position: Up in chair  Patient presents with decline in ADLs, functional transfers, functional mobility and would benefit from OT during acute stay to improve functional independence and safety. Recommend low intensity OT to maximize functional independence and safety.     Plan:  Treatment Interventions: ADL retraining, Functional transfer training, Patient/family training, Equipment evaluation/education, Compensatory technique education  OT Frequency: Daily  OT Discharge Recommendations: Low intensity level of continued care  Equipment Recommended upon Discharge: Wheeled walker (shower chair, sock aide)  OT - OK to Discharge: Yes from acute care OT services to the next level of care when cleared by medical team      Subjective   Current Problem:  1. Unilateral primary osteoarthritis, right knee        2. Total knee replacement status, right  Referral to Home Health          General:  General  Reason for Referral: right TKA  Referred By: Carlitos Gallagher MD  Past Medical History Relevant to Rehab: Admitted 3/28/2025 after having right TKA completed by Dr Gallagher.  Prior to Session Communication: Bedside nurse  Patient Position Received: Bed, 3 rail up  Preferred Learning Style: verbal  General Comment: Patient in long legged sitting in bed and agreeable to participate in OT evaluation.    Precautions:  LE Weight Bearing Status: Weight Bearing as Tolerated  Medical Precautions: Fall precautions  Post-Surgical Precautions: Right total knee precautions    Vital Signs:       Pain:  Pain Assessment  Pain Assessment: 0-10  0-10 (Numeric) Pain Score:  (moderate  pain)  Pain Type: Surgical pain  Pain Location: Knee  Pain Orientation: Right  Pain Interventions:  (recently medicated for pain)    Objective   Cognition:  Overall Cognitive Status: Within Functional Limits  Orientation Level: Oriented X4    Home Living:  Home Living Comments: Lives in 2 story home with spouse and children with 2 STAS.  Has 1/2 bathroom on first floor and tub shower on 2nd floor.  Owns cane and WW.     Prior Function:  Prior Function Comments: Was independent with all ADLs, IADLs and recently transitioned off cane from recent left TKA.    ADL:  UE Dressing Assistance: Independent (don bra and pull over shirt)  LE Dressing Assistance: Minimal (don pants)  ADL Comments: Educated patient on safety and comp strategies for LB dressing and patient verbalized understanding.  Instructed patient on use of sock aide to don socks and patient verbalized understanding    Activity Tolerance:  Endurance: Endurance does not limit participation in activity    Bed Mobility/Transfers:   Bed Mobility  Bed Mobility:  (min assist supine to sit, assist of right LE)  Transfers  Transfer:  (SBA sit <>stand)  Educated patient on safety with car transfers and patient verbalized understanding.    Educated patient on safety and use of shower chair for safety and patient verbalized understanding.     Ambulation/Gait Training:  Functional Mobility  Functional Mobility Performed:  (Supervision with WW functional mobility task in room)    Extremities: RUE   RUE : Within Functional Limits and LUE   LUE: Within Functional Limits    Outcome Measures: [facility] Daily Activity  Putting on and taking off regular lower body clothing: A little  Bathing (including washing, rinsing, drying): A little  Putting on and taking off regular upper body clothing: None  Toileting, which includes using toilet, bedpan or urinal: A little  Taking care of personal grooming such as brushing teeth: A little  Eating Meals: None  Daily Activity - Total Score:  20    EDUCATION:  Education  Individual(s) Educated: Patient  Education Provided: Fall precautons (safety and comp strategies with LB dressing)  Patient Response to Education: Patient/Caregiver Verbalized Understanding of Information    Goals:   Encounter Problems       Encounter Problems (Active)       Dressings Lower Extremities       STG - Patient will complete lower body dressing independently with AE and comp strategies  (Progressing)       Start:  03/29/25    Expected End:  04/12/25               Functional Balance       STG-Patient will be independent with assistive device dynamic stand task >5 minutes for ADL completion   (Progressing)       Start:  03/29/25    Expected End:  04/12/25               Functional Mobility       STG-Patient will be independent with assistive device functional mobility tasks   (Progressing)       Start:  03/29/25    Expected End:  04/12/25               OT Transfers       STG - Patient will perform car transfers independently demonstrating good safety  (Progressing)       Start:  03/29/25    Expected End:  04/12/25            STG-Patient will be independent with functional transfers demonstrating good safety   (Progressing)       Start:  03/29/25    Expected End:  04/12/25

## 2025-03-29 NOTE — ASSESSMENT & PLAN NOTE
POD #1 s/p right TKA  Continue PT/OT, WBAT right  LE  Reviewed am labs  Multimodal pain regimen  knee dressing to be removed on post op day #7  VTE prophylaxis: eliquis 2.5 mg BID X 30 days  Will discharge home when appropriate with [facility]  Follow up with Dr. Gallagher as directed

## 2025-03-30 ENCOUNTER — HOME CARE VISIT (OUTPATIENT)
Dept: HOME HEALTH SERVICES | Facility: HOME HEALTH | Age: 45
End: 2025-03-30
Payer: COMMERCIAL

## 2025-03-30 VITALS
OXYGEN SATURATION: 98 % | SYSTOLIC BLOOD PRESSURE: 122 MMHG | HEART RATE: 92 BPM | TEMPERATURE: 97.9 F | DIASTOLIC BLOOD PRESSURE: 88 MMHG

## 2025-03-30 PROCEDURE — 0023 HH SOC

## 2025-03-30 PROCEDURE — G0151 HHCP-SERV OF PT,EA 15 MIN: HCPCS

## 2025-03-30 SDOH — ECONOMIC STABILITY: HOUSING INSECURITY: HOME SAFETY: UH HANDBOOK REVIEWED WITH PATIENT

## 2025-03-30 ASSESSMENT — ENCOUNTER SYMPTOMS
MUSCLE WEAKNESS: 1
PERSON REPORTING PAIN: PATIENT
PAIN SEVERITY GOAL: 3/10
LIMITED RANGE OF MOTION: 1
PAIN: 1
HIGHEST PAIN SEVERITY IN PAST 24 HOURS: 8/10
LOWEST PAIN SEVERITY IN PAST 24 HOURS: 7/10
SUBJECTIVE PAIN PROGRESSION: WAXING AND WANING

## 2025-03-30 ASSESSMENT — ACTIVITIES OF DAILY LIVING (ADL)
CURRENT_FUNCTION: STAND BY ASSIST
AMBULATION ASSISTANCE ON FLAT SURFACES: 1
OASIS_M1830: 02
AMBULATION_DISTANCE/DURATION_TOLERATED: 75 FEET
AMBULATION ASSISTANCE: STAND BY ASSIST
ENTERING_EXITING_HOME: SUPERVISION

## 2025-03-30 NOTE — HOME HEALTH
Pt admitted to  home care skilled for PT after r tka by Dr Wills on 04720.  Pt is wbat with orders for pod 7 dressing removal.

## 2025-04-02 ENCOUNTER — HOME CARE VISIT (OUTPATIENT)
Dept: HOME HEALTH SERVICES | Facility: HOME HEALTH | Age: 45
End: 2025-04-02
Payer: COMMERCIAL

## 2025-04-02 PROCEDURE — G0157 HHC PT ASSISTANT EA 15: HCPCS | Mod: CQ

## 2025-04-04 ENCOUNTER — TELEPHONE (OUTPATIENT)
Dept: ORTHOPEDIC SURGERY | Facility: CLINIC | Age: 45
End: 2025-04-04
Payer: COMMERCIAL

## 2025-04-04 ENCOUNTER — HOME CARE VISIT (OUTPATIENT)
Dept: HOME HEALTH SERVICES | Facility: HOME HEALTH | Age: 45
End: 2025-04-04
Payer: COMMERCIAL

## 2025-04-04 PROCEDURE — G0157 HHC PT ASSISTANT EA 15: HCPCS | Mod: CQ

## 2025-04-08 ENCOUNTER — HOME CARE VISIT (OUTPATIENT)
Dept: HOME HEALTH SERVICES | Facility: HOME HEALTH | Age: 45
End: 2025-04-08
Payer: COMMERCIAL

## 2025-04-08 PROCEDURE — G0157 HHC PT ASSISTANT EA 15: HCPCS | Mod: CQ

## 2025-04-09 ENCOUNTER — HOME CARE VISIT (OUTPATIENT)
Dept: HOME HEALTH SERVICES | Facility: HOME HEALTH | Age: 45
End: 2025-04-09
Payer: COMMERCIAL

## 2025-04-09 VITALS
TEMPERATURE: 96.7 F | OXYGEN SATURATION: 97 % | DIASTOLIC BLOOD PRESSURE: 70 MMHG | HEART RATE: 96 BPM | SYSTOLIC BLOOD PRESSURE: 120 MMHG

## 2025-04-09 DIAGNOSIS — M25.569 KNEE PAIN, UNSPECIFIED CHRONICITY, UNSPECIFIED LATERALITY: Primary | ICD-10-CM

## 2025-04-09 PROCEDURE — G0151 HHCP-SERV OF PT,EA 15 MIN: HCPCS

## 2025-04-09 RX ORDER — CEPHALEXIN 500 MG/1
500 CAPSULE ORAL 2 TIMES DAILY
Qty: 14 CAPSULE | Refills: 0 | Status: SHIPPED | OUTPATIENT
Start: 2025-04-09 | End: 2025-04-19

## 2025-04-09 ASSESSMENT — ENCOUNTER SYMPTOMS
SUBJECTIVE PAIN PROGRESSION: GRADUALLY IMPROVING
LOWEST PAIN SEVERITY IN PAST 24 HOURS: 3/10
MUSCLE WEAKNESS: 1
HIGHEST PAIN SEVERITY IN PAST 24 HOURS: 7/10
PERSON REPORTING PAIN: PATIENT
PAIN: 1
LIMITED RANGE OF MOTION: 1

## 2025-04-09 ASSESSMENT — ACTIVITIES OF DAILY LIVING (ADL)
HOME_HEALTH_OASIS: 00
OASIS_M1830: 01

## 2025-04-09 NOTE — CASE COMMUNICATION
DISCHARGE SUMMARY: Pt has some inflammation at incision joint line at dc.  Sees Md for follow up on [id number].  See Media for DC wound picture.      DISCIPLINE: PT  DATE OF DISCIPLINE DISCHARGE: 5610  REASON FOR DISCHARGE: Goals met   EVALUATION OF GOALS: Yes  SUMMARY OF CARE PROVIDED: hep, gait training, rom, strengthening, pain management instruction and incision care  DISCHARGE INSTRUCTIONS GIVEN: Cont home exercises, use ad for fall pre vention, follow up with out patient service  SERVICES REMAINING: none  NOMNC OBTAINED: yes

## 2025-04-11 ENCOUNTER — HOSPITAL ENCOUNTER (OUTPATIENT)
Dept: RADIOLOGY | Facility: CLINIC | Age: 45
Discharge: HOME | End: 2025-04-11
Payer: COMMERCIAL

## 2025-04-11 ENCOUNTER — APPOINTMENT (OUTPATIENT)
Dept: ORTHOPEDIC SURGERY | Facility: CLINIC | Age: 45
End: 2025-04-11
Payer: COMMERCIAL

## 2025-04-11 DIAGNOSIS — Z96.651 STATUS POST TOTAL KNEE REPLACEMENT, RIGHT: ICD-10-CM

## 2025-04-11 PROCEDURE — 73562 X-RAY EXAM OF KNEE 3: CPT | Mod: RT

## 2025-04-11 RX ORDER — METHYLPREDNISOLONE 4 MG/1
4 TABLET ORAL ONCE
Qty: 21 TABLET | Refills: 0 | Status: SHIPPED | OUTPATIENT
Start: 2025-04-11 | End: 2025-04-11

## 2025-04-11 RX ORDER — TRAMADOL HYDROCHLORIDE 50 MG/1
50 TABLET ORAL EVERY 6 HOURS PRN
Qty: 28 TABLET | Refills: 0 | Status: CANCELLED | OUTPATIENT
Start: 2025-04-11 | End: 2025-04-18

## 2025-04-11 NOTE — PROGRESS NOTES
History of Present Illness:  Patient returns today endorsing moderate pain.  Denies any numbness or tingling.  No calf pain, No chest pain or shortness of breath.    Physical Examination:  Rt knee  Mild swelling  Rash around incision no drainage   ROM:  5-95  + Plantar/dorsiflexion  Negative Madalyn test    Left knee  Decreased deep flexion     Imaging:  Appropriate position and alignment no evidence of implant failure     Assessment  Patient status post bilateral total knee arthroplasty, doing well    Plan:  Discussed analgesics, encouraging non-opioid modalities.  Encouraged ice, rest.  Discussed importance of ROM/ formal physical therapy.  Reviewed dental prophylaxis.    Patient with a skin reaction to the adhesive no evidence of infection.  Discussed steroid pack, on Keflex.  Follow-up in 1 month for a motion check.

## 2025-04-17 ENCOUNTER — TELEPHONE (OUTPATIENT)
Dept: ORTHOPEDIC SURGERY | Facility: CLINIC | Age: 45
End: 2025-04-17
Payer: COMMERCIAL

## 2025-04-17 DIAGNOSIS — Z96.651 STATUS POST TOTAL KNEE REPLACEMENT, RIGHT: ICD-10-CM

## 2025-04-17 RX ORDER — TRAMADOL HYDROCHLORIDE 50 MG/1
50 TABLET ORAL EVERY 12 HOURS PRN
Qty: 14 TABLET | Refills: 0 | Status: SHIPPED | OUTPATIENT
Start: 2025-04-17 | End: 2025-04-24

## 2025-04-23 ENCOUNTER — EVALUATION (OUTPATIENT)
Dept: PHYSICAL THERAPY | Facility: CLINIC | Age: 45
End: 2025-04-23
Payer: COMMERCIAL

## 2025-04-23 DIAGNOSIS — M17.0 PRIMARY OSTEOARTHRITIS OF BOTH KNEES: Primary | ICD-10-CM

## 2025-04-23 DIAGNOSIS — G89.18 POST-OPERATIVE PAIN: ICD-10-CM

## 2025-04-23 PROCEDURE — 97161 PT EVAL LOW COMPLEX 20 MIN: CPT | Mod: GP

## 2025-04-23 PROCEDURE — 97110 THERAPEUTIC EXERCISES: CPT | Mod: GP

## 2025-04-23 NOTE — PROGRESS NOTES
Physical Therapy Evaluation and Treatment      Patient Name: Keiry Kline  MRN: [id number]  Today's Date: 4/23/2025     Time Calculation  Start Time: 1045  Stop Time: 1130  Time Calculation (min): 45 min  PT Therapeutic Procedures Time Entry  Therapeutic Exercise Time Entry: 30  PT Evaluation (Low) Time Entry: 15    Visit: 7/MN this year, 1st for Rt Knee  Referred by:  Carlitos Gallagher MD, Referring Provider  Referral#:  [id number]     Insurance:   [employer] [facility]   Certification Dates:  4-23-25 to 7-2-25  Current Problem:   Problem List Items Addressed This Visit           ICD-10-CM       Musculoskeletal and Injuries    Primary osteoarthritis of both knees - Primary M17.0    Relevant Orders    Follow Up In Physical Therapy     Other Visit Diagnoses         Codes      Post-operative pain     G89.18    Relevant Orders    Follow Up In Physical Therapy          1. Primary osteoarthritis of both knees  Referral to Physical Therapy    Follow Up In Physical Therapy      2. Post-operative pain  Referral to Physical Therapy    Follow Up In Physical Therapy               Surgery:  Rt TKR, 3-28-25  HPI:  Degenerative, primary OA B knees, genu varus B.       Subjective:  Pt stated this knee surgery has definitely been better than the LT TKR.  She drove to the clinic today.  She starts her clinic for her RT 5-28-25    Patient's Living Environment/PLOF: Independent with all ADL's prior to current injury, lives with spouse who is a olivera.  She works as a  for [employer].  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      Home Living: No concerns, lives in split level, has double railing on all stairs. 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, OA Rt knee)     Precautions:  WBAT     Objective   Knee AROM: (degrees) Left Right   Flexion 106 116   Extension 0 -10   Flexibility     Hamstrings -12 -14   Gastroc 14 16     Knee Strength: MMT Right Left   Flexion -3/5 5/5   Extension -3/5 5/5   Hip Flexion 4/5 5/5   Hip Abduction -4/5 5/5   Hip Adduction 4/5 5/5   Ankle DF 5/5 5/5   Ankle PF 5/5 5/5   *denotes pain with motion or muscle testing    Swelling/Girth:  per visual inspection, mild through Rt knee  Palpation:  minimal to none tenderness to palpation  Patellar mobility:  Good all directions    HEP Compliance:  100%  Transfers:  + use of hands to push up from low surfaces  Gait:  Independent w/o assistive device no gait deficits  Stairs:  Can do reciprocal stairclimbing for ascending w/ 1 rail, descending is a step to pattern  Balance:  Rt impaired, 3 sec prior to stepping strategy or grabbing onto external support.    Outcome Measures:  LEFS: 33/80, 41.25%  Follow Up with MD:  4-11-25    Treatments:     Access Code: [email or web address]    Exercises  - Supine Short Arc Quad,  3 x 10   - QS w/ towel under knee,  2 x 10, 3 sec hold  - Seated Knee Flex EOM,  10 x 5 sec hold  - Mini Squats, 3 x 10   - Bike, Level 1, 7 Min,  able to perform full revolutions    Heel Slides, 10 x 5 sec    TKR, Green, 20 x 3 sec hold    Assessment:     Pt is a 44 yo female who presented to the clinic today s/p Rt TKR on 3-28-25.  PMH:  BMI: 36.31 kg/m² , HTN, OA Knees.  Examination reveals the following deficits: decreased Rt knee AROM, decreased flexibility/hamstrings, decreased WB/positional tolerances. gait abnormalities, decreased Rt knee/LE strength & balance Rt LE. These deficits lead to difficulties with ADLs as well as recreational activity.   Skilled PT will address these deficits to help reduce pain for return to PLOF.  He will benefit from skilled PT to address the above mentioned impairments. Low complexity due to patient's clinical presentation being stable and uncomplicated by any significant comorbidities that may affect rehab tolerance and progression.     Clinical presentation:  Stable and/or uncomplicated characteristics, evolving      Problem List:  decreased Rt knee AROM, decreased flexibility/hamstrings, decreased WB/positional tolerances. gait abnormalities, decreased Rt knee/LE strength & balance Rt LE.    Plan: Continue per POC & pt tolerance.  Frequency/duration: 2x/wk for 12 visits      Treatment Interventions:  MT, therapeutic exercise, neuro re-ed, modalities prn (education/ Instruction, ,Self care/ home management), Therapeutic activities, Gait training.    PT Plan: Skilled PT  Patient/caregiver agrees with POC:  yes    Rehab potential:  Good  Plan of care agreement: Patient understands & agrees with goals & plan documented    EDUCATION: HEP, POC, activity modifications/progression, pain management/modalities, and injury pathology     TODAY'S TREATMENT  Evaluation of the Rt Knee completed.  Pt was educated on their dx and the pertinent anatomy.  Pt was given appropriate referrals.  HEP as seen above:    Goals:   Active       PT Problem       PT Goal 1       Start:  02/19/25    Expected End:  07/02/25       Pt independent with HEP and consistent for optimal recovery and self-management after DC from PT.           PT Goal 2       Start:  02/19/25    Expected End:  07/02/25       Pt will improve Rt knee AROM to 0-120 to improve functional mobility for ADL's, school &  work.         PT Goal 3       Start:  02/19/25    Expected End:  07/02/25       Patient will increase Rt knee / LE strength to +4/5 to improve functional mobility for ADL's, school & work.            PT Goal 4       Start:  02/19/25    Expected End:  07/02/25       Pt will  increase LEFS score to > or = 80% to demonstrate functional improvement.

## 2025-04-25 ENCOUNTER — TELEPHONE (OUTPATIENT)
Dept: ORTHOPEDIC SURGERY | Facility: CLINIC | Age: 45
End: 2025-04-25

## 2025-04-25 ENCOUNTER — TREATMENT (OUTPATIENT)
Dept: PHYSICAL THERAPY | Facility: CLINIC | Age: 45
End: 2025-04-25
Payer: COMMERCIAL

## 2025-04-25 DIAGNOSIS — Z96.651 TOTAL KNEE REPLACEMENT STATUS, RIGHT: ICD-10-CM

## 2025-04-25 DIAGNOSIS — Z96.651 STATUS POST TOTAL KNEE REPLACEMENT, RIGHT: ICD-10-CM

## 2025-04-25 DIAGNOSIS — M17.0 PRIMARY OSTEOARTHRITIS OF BOTH KNEES: ICD-10-CM

## 2025-04-25 DIAGNOSIS — G89.18 POST-OPERATIVE PAIN: ICD-10-CM

## 2025-04-25 PROCEDURE — 97110 THERAPEUTIC EXERCISES: CPT | Mod: GP

## 2025-04-25 RX ORDER — TRAMADOL HYDROCHLORIDE 50 MG/1
50 TABLET ORAL EVERY 12 HOURS PRN
Qty: 14 TABLET | Refills: 0 | Status: SHIPPED | OUTPATIENT
Start: 2025-04-25 | End: 2025-05-02

## 2025-04-25 NOTE — PROGRESS NOTES
Physical Therapy Treatment Note     Patient Name: Keiry Kline  MRN: [id number]  Today's Date: 4/25/2025     Time Calculation  Start Time: 0930  Stop Time: 1015  Time Calculation (min): 45 min  PT Therapeutic Procedures Time Entry  Therapeutic Exercise Time Entry: 45    Visit: 8/MN this year, 1st for Rt Knee  Referred by:  Carlitso Gallagher MD, Referring Provider  Referral#:  [id number]     Insurance:   [employer] [facility]   Certification Dates:  4-23-25 to 7-2-25  Current Problem:   Problem List Items Addressed This Visit           ICD-10-CM       Musculoskeletal and Injuries    Primary osteoarthritis of both knees M17.0     Other Visit Diagnoses         Codes      Post-operative pain     G89.18          1. Post-operative pain  Follow Up In Physical Therapy      2. Primary osteoarthritis of both knees  Follow Up In Physical Therapy               Surgery:  Rt TKR, 3-28-25  HPI:  Degenerative, primary OA B knees, genu varus B.       Subjective:  Pt stated this knee surgery has definitely been better than the LT TKR.  She drove to the clinic today.  She starts her clinic for her RT 5-28-25    Patient's Living Environment/PLOF: Independent with all ADL's prior to current injury, lives with spouse who is a olivera.  She works as a  for [employer].  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      Home Living: No concerns, lives in split level, has double railing on all stairs. 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  (HTN, OA Rt knee)     Precautions:  WBAT     Objective     Swelling/Girth:  per visual inspection, mild through Rt knee  Palpation:  minimal to none tenderness to palpation    HEP Compliance:  100%  Transfers:  + use of hands to push up from low surfaces    Outcome Measures:  LEFS: 33/80, 41.25%    Follow Up with MD:  4-11-25    Treatments:     Access Code: [email or web address]    Rt Knee AROM:  0-111    Exercises    - Supine SAQ's,  3 x 10, 1 lbs  - QS w/ towel under knee,  2 x 10, 3 sec hold  - Seated Knee Flex EOM,  10 x 5 sec hold  - Mini Squats, 3 x 10   - Bike, Level 1, 7 Min,  able to perform full revolutions    Heel Slides, 10 x 5 sec    TKE, 40 lbs, 3 sec hold    Partial Squats, 2 x 10    4 inch step ups, 2 x 10    Hamstrings Curls, Green, 2 x 15    SLR, 1 lb, 2 x 10    Assessment:     Pt continues to do really well post surgery, she is 4 wks post op today.  Functionally she is ambulating with a std cane, & mild antalgia on the Rt LE.  She is sore all day after PT visits and still taking Tramadol for pain & Tylenol in between.  + patellar pain with 1 lb on Rt Leg during SLR.  Otherwise, progressing well to date.      Plan: Continue per POC & pt tolerance.  Frequency/duration: 2x/wk for 12 visits

## 2025-04-28 DIAGNOSIS — Z30.41 ENCOUNTER FOR SURVEILLANCE OF CONTRACEPTIVE PILLS: ICD-10-CM

## 2025-04-28 RX ORDER — DROSPIRENONE 4 MG/1
1 TABLET, FILM COATED ORAL NIGHTLY
Qty: 84 TABLET | Refills: 3 | OUTPATIENT
Start: 2025-04-28

## 2025-04-30 ENCOUNTER — TREATMENT (OUTPATIENT)
Dept: PHYSICAL THERAPY | Facility: CLINIC | Age: 45
End: 2025-04-30
Payer: COMMERCIAL

## 2025-04-30 DIAGNOSIS — G89.18 POST-OPERATIVE PAIN: ICD-10-CM

## 2025-04-30 DIAGNOSIS — M17.0 PRIMARY OSTEOARTHRITIS OF BOTH KNEES: ICD-10-CM

## 2025-04-30 PROCEDURE — 97110 THERAPEUTIC EXERCISES: CPT | Mod: GP

## 2025-04-30 NOTE — PROGRESS NOTES
Physical Therapy Treatment Note     Patient Name: Keiry Kline  MRN: [id number]  Today's Date: 4/30/2025     Time Calculation  Start Time: 0845  Stop Time: 0930  Time Calculation (min): 45 min  PT Therapeutic Procedures Time Entry  Therapeutic Exercise Time Entry: 45    Visit: 9/MN this year, 1st for Rt Knee  Referred by:  Carlitos Gallagher MD, Referring Provider  Referral#:  [id number]     Insurance:   [employer] P, ded $3300 20% coins, oop $4500 $0 copay. No auth  Certification Dates:  4-23-25 to 7-2-25  Current Problem:   Problem List Items Addressed This Visit           ICD-10-CM       Musculoskeletal and Injuries    Primary osteoarthritis of both knees M17.0     Other Visit Diagnoses         Codes      Post-operative pain     G89.18          1. Post-operative pain  Follow Up In Physical Therapy      2. Primary osteoarthritis of both knees  Follow Up In Physical Therapy               Surgery:  Rt TKR, 3-28-25  HPI:  Degenerative, primary OA B knees, genu varus B.       Subjective:  Pt stated this knee surgery has definitely been better than the LT TKR.  She drove to the clinic today.  She starts her clinic for her RT 5-28-25    Patient's Living Environment/PLOF: Independent with all ADL's prior to current injury, lives with spouse who is a olivera.  She works as a  for [employer].  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      Home Living: No concerns, lives in split level, has double railing on all stairs. 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, OA Rt knee)     Precautions:  WBAT     Objective     Swelling/Girth:  per visual inspection, mild through Rt knee  Palpation:  minimal to none tenderness to palpation    HEP Compliance:  100%  Transfers:  + use of hands to push up from low surfaces    Outcome Measures:  LEFS: 33/80, 41.25%    Follow Up with MD:  4-11-25    Treatments:     Access Code: [email or web address]    Rt Knee AROM:  0-111    Exercises    - Supine SAQ's,  3 x 10, 2 lbs  - SLS,  3 x 15 sec hold  - Total Gym Squats, 3 x 10   - Bike, Level 1, 5 Min    Heel Slides, 10 x 5 sec    TKE, 40 lbs, 3 sec hold    Partial Squats, 2 x 10    6 inch step ups, 2 x 10    Hamstrings Curls, Green, 2 x 15    SLR, 1 lb, 2 x 10    Hamstring Curls, 2 lbs, B    Rockerboard, 20 x    Gastroc Stretch, 3 x 30    Reverse Lunges, 5 x each    Assessment:     Pt will be 5 wks post this Friday & doing well.  She is progressing w/o issues.  Functionally she is ambulating with a std cane, & mild antalgia on the Rt LE but reports being able to walk around at home w/o the cane.  She was able to increase wts & step height today.      Plan: Continue per POC & pt tolerance.  Frequency/duration: 2x/wk for 12 visits

## 2025-05-02 RX ORDER — APIXABAN 2.5 MG/1
TABLET, FILM COATED ORAL
Qty: 60 TABLET | Refills: 0 | OUTPATIENT
Start: 2025-05-02

## 2025-05-05 ENCOUNTER — APPOINTMENT (OUTPATIENT)
Dept: OBSTETRICS AND GYNECOLOGY | Facility: CLINIC | Age: 45
End: 2025-05-05
Payer: COMMERCIAL

## 2025-05-05 ENCOUNTER — TREATMENT (OUTPATIENT)
Dept: PHYSICAL THERAPY | Facility: CLINIC | Age: 45
End: 2025-05-05
Payer: COMMERCIAL

## 2025-05-05 VITALS
SYSTOLIC BLOOD PRESSURE: 140 MMHG | DIASTOLIC BLOOD PRESSURE: 99 MMHG | BODY MASS INDEX: 38.83 KG/M2 | WEIGHT: 211 LBS | HEIGHT: 62 IN

## 2025-05-05 DIAGNOSIS — Z30.09 BIRTH CONTROL COUNSELING: ICD-10-CM

## 2025-05-05 DIAGNOSIS — Z01.419 WELL WOMAN EXAM WITH ROUTINE GYNECOLOGICAL EXAM: Primary | ICD-10-CM

## 2025-05-05 DIAGNOSIS — G89.18 POST-OPERATIVE PAIN: ICD-10-CM

## 2025-05-05 DIAGNOSIS — M17.0 PRIMARY OSTEOARTHRITIS OF BOTH KNEES: ICD-10-CM

## 2025-05-05 PROCEDURE — 99396 PREV VISIT EST AGE 40-64: CPT | Performed by: OBSTETRICS & GYNECOLOGY

## 2025-05-05 PROCEDURE — 3077F SYST BP >= 140 MM HG: CPT | Performed by: OBSTETRICS & GYNECOLOGY

## 2025-05-05 PROCEDURE — 97110 THERAPEUTIC EXERCISES: CPT | Mod: GP

## 2025-05-05 PROCEDURE — 3080F DIAST BP >= 90 MM HG: CPT | Performed by: OBSTETRICS & GYNECOLOGY

## 2025-05-05 PROCEDURE — 99459 PELVIC EXAMINATION: CPT | Performed by: OBSTETRICS & GYNECOLOGY

## 2025-05-05 PROCEDURE — 3008F BODY MASS INDEX DOCD: CPT | Performed by: OBSTETRICS & GYNECOLOGY

## 2025-05-05 PROCEDURE — 1036F TOBACCO NON-USER: CPT | Performed by: OBSTETRICS & GYNECOLOGY

## 2025-05-05 NOTE — PROGRESS NOTES
History Of Present Illness  Routine Gyn Exam  Keiry Kline here for routine WWE. Current complaints: She recently hd bilateral knee replacement.  She takes Slynd for birth control. Her PMHx is notable for HTN and high cholesterol.  She desires a refill on her birth control.     Gynecologic History  Patient's last menstrual period was 04/10/2025 (approximate).  Contraception: slynd-- Cr and K+ normal in 3/2025  Last Pap: 2024. Results were: normal/neg  Last mammogram: 2025. Results were: normal  Last Colonoscopy:  NA. Results were: NA  Last DEXA: NA. Results were: NA  Lipid/DM: with PCP    Obstetric History  OB History    Para Term  AB Living   2 2 2      SAB IAB Ectopic Multiple Live Births             # Outcome Date GA Lbr [place]/2nd Weight Sex Type Anes PTL Lv   2 Term      Vag-Spont   SOLO   1 Term      Vag-Spont   SOLO        Past Medical History  She has a past medical history of Acute frontal sinusitis (2023), Acute upper respiratory infection (2023), Ankle pain (2023), Arthritis, Chest pain (2023), CTS (carpal tunnel syndrome), Delayed emergence from general anesthesia, Hyperlipidemia, Hypertension, Nausea (2023), Obesity, Other conditions influencing health status, Pain, wrist joint (2023), Pain, wrist joint (2023), and PONV (postoperative nausea and vomiting).    Surgical History  She has a past surgical history that includes Other surgical history (05/10/2013); Ankle surgery (05/10/2013); Other surgical history (05/10/2013); Knee Arthroplasty; Joint replacement; Ankle arthroplasty; Myomectomy; and Cervical biopsy w/ loop electrode excision.     Social History  She reports that she has never smoked. She has never used smokeless tobacco. She reports that she does not currently use alcohol. She reports that she does not use drugs.    Family History  Family History[1]     Meds:  Current Medications[2]    Allergies  Patient has no known  "allergies.     Chaperone: present  Physical Exam  Exam conducted with a chaperone present.   Constitutional:       Appearance: Normal appearance.   Pulmonary:      Effort: Pulmonary effort is normal.   Chest:   Breasts:     Right: Normal. No swelling, inverted nipple, mass or skin change.      Left: Normal. No swelling, inverted nipple, mass or skin change.   Abdominal:      General: Abdomen is flat. There is no distension.      Palpations: Abdomen is soft.      Tenderness: There is no abdominal tenderness.   Genitourinary:     Labia:         Right: No rash, tenderness or lesion.         Left: No rash, tenderness or lesion.       Urethra: No prolapse.      Vagina: Normal.      Cervix: Normal.      Uterus: Normal. Not enlarged.       Adnexa:         Right: No mass or tenderness.          Left: No mass.        Rectum: Normal.   Neurological:      General: No focal deficit present.      Mental Status: She is alert.   Psychiatric:         Mood and Affect: Mood normal.          Last Recorded Vitals  Blood pressure (!) 140/99, height 1.575 m (5' 2\"), weight 95.7 kg (211 lb), last menstrual period 04/10/2025.    Assessment/Plan   Problem List Items Addressed This Visit    None  Visit Diagnoses         Well woman exam with routine gynecological exam    -  Primary      Birth control counseling        Relevant Medications    drospirenone, contraceptive, 4 mg (28) tablet            Contraception: Slynd  PAP: due 2029   Mammogram: up to date   Colonoscopy:  will discuss with PCP   DEXA: NA   Lipid/DM: obtained through PCP     Kwaku Root MD       [1]   Family History  Problem Relation Name Age of Onset    Diabetes Mother Akila Guerrero     Depression Mother Akila Guerrero     Hyperlipidemia Mother Akila Guerrero     Hypertension Father Eddie Guerrero     Hyperlipidemia Father Eddie Guerrero     Hypertension Sister Sandra     Arthritis Maternal Grandmother Lizbet Mari     Cancer Maternal Grandmother Lizbet Mari     Cancer " Paternal Grandfather Shyam Guerrero     Diabetes Paternal Grandmother Alee guerrero     Asthma Sister Sandra Guerrero     Hyperlipidemia Sister Sandra Guerrero    [2]   Current Outpatient Medications:     aspirin-acetaminophen-caffeine (Excedrin Migraine) 250-250-65 mg tablet, Take 1 tablet by mouth every 6 hours if needed for headaches., Disp: , Rfl:     atorvastatin (Lipitor) 10 mg tablet, Take 1 tablet (10 mg) by mouth once daily., Disp: 90 tablet, Rfl: 1    traMADol (Ultram) 50 mg tablet, Take 1 tablet (50 mg) by mouth every 12 hours if needed for severe pain (7 - 10) for up to 7 days., Disp: 14 tablet, Rfl: 0    valsartan (Diovan) 80 mg tablet, Take 1 tablet (80 mg) by mouth once daily., Disp: 90 tablet, Rfl: 1    acetaminophen (Tylenol) 325 mg tablet, Take 2 tablets (650 mg) by mouth every 4 hours if needed for mild pain (1 - 3) or moderate pain (4 - 6)., Disp: , Rfl:

## 2025-05-05 NOTE — PROGRESS NOTES
Physical Therapy Treatment Note     Patient Name: Keiry Kline  MRN: [id number]  Today's Date: 5/5/2025     Time Calculation  Start Time: 1045  Stop Time: 1130  Time Calculation (min): 45 min  PT Therapeutic Procedures Time Entry  Therapeutic Exercise Time Entry: 45    Visit: 10/MN this year, 4th for Rt Knee  Referred by:  Carlitos Gallagher MD, Referring Provider  Referral#:  [id number]     Insurance:   [employer] P, ded $3300 20% coins, oop $4500 $0 copay. No auth  Certification Dates:  4-23-25 to 7-2-25  Current Problem:   Problem List Items Addressed This Visit           ICD-10-CM       Musculoskeletal and Injuries    Primary osteoarthritis of both knees M17.0     Other Visit Diagnoses         Codes      Post-operative pain     G89.18          1. Post-operative pain  Follow Up In Physical Therapy      2. Primary osteoarthritis of both knees  Follow Up In Physical Therapy               Surgery:  Rt TKR, 3-28-25  HPI:  Degenerative, primary OA B knees, genu varus B.       Subjective:  Pt stated this knee surgery has definitely been better than the LT TKR.  She drove to the clinic today.  She starts her clinic for her RT 5-28-25    Patient's Living Environment/PLOF: Independent with all ADL's prior to current injury, lives with spouse who is a olivera.  She works as a  for [employer].  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      Home Living: No concerns, lives in split level, has double railing on all stairs. 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, OA Rt knee)     Precautions:  WBAT     Objective     Swelling/Girth:  per visual inspection, mild through Rt knee  Palpation:  minimal to none tenderness to palpation    HEP Compliance:  100%  Transfers:  + use of hands to push up from low surfaces    Outcome Measures:  LEFS: 33/80, 41.25%    Follow Up with MD:  4-11-25    Treatments:     Access Code: [email or web address]    Rt Knee AROM:  0-112/114 with overpressure, pain at end range  Lt Knee AROM:  0-112/116 with overpressure pain at end range  Exercises    - LAQ's,  3 x 10, 2 lbs  - SLS,  3 x 15 sec hold  - Total Gym Squats, 3 x 10   - Bike, Level 1, 5 Min    Heel Slides, 10 x 5 sec, B    TKE, 50 lbs, 3 sec hold    Sit to Std, 1 blue foam, 2 x 10    6 inch step ups, 2 x 10    Hamstrings Curls, Black, 2 x 15    Hamstring Curls, 2 lbs, B    Rockerboard, 20 x    Gastroc Stretch, 3 x 30     Assessment:     Pt is progressing w/o issues.  She is just over 5 wks post op.  Functionally she is ambulating with a std cane, & mild antalgia on the Rt LE but reports being able to walk around at home w/o the cane.  She is progressing week to week with over loads & activity tolerances.        Plan: Continue per POC & pt tolerance.  Frequency/duration: 2x/wk for 12 visits

## 2025-05-07 ENCOUNTER — TREATMENT (OUTPATIENT)
Dept: PHYSICAL THERAPY | Facility: CLINIC | Age: 45
End: 2025-05-07
Payer: COMMERCIAL

## 2025-05-07 DIAGNOSIS — G89.18 POST-OPERATIVE PAIN: ICD-10-CM

## 2025-05-07 DIAGNOSIS — M17.0 PRIMARY OSTEOARTHRITIS OF BOTH KNEES: ICD-10-CM

## 2025-05-07 PROCEDURE — 97110 THERAPEUTIC EXERCISES: CPT | Mod: GP

## 2025-05-07 NOTE — PROGRESS NOTES
Physical Therapy Treatment Note     Patient Name: Keiry Kline  MRN: [id number]  Today's Date: 5/7/2025     Time Calculation  Start Time: 0830  Stop Time: 0915  Time Calculation (min): 45 min  PT Therapeutic Procedures Time Entry  Therapeutic Exercise Time Entry: 45    Visit: 11/MN this year,  5 for Rt Knee  Referred by:  Carlitos Gallagher MD, Referring Provider  Referral#:  [id number]     Insurance:   [employer] P, ded $3300 20% coins, oop $4500 $0 copay. No auth  Certification Dates:  4-23-25 to 7-2-25  Current Problem:   Problem List Items Addressed This Visit           ICD-10-CM       Musculoskeletal and Injuries    Primary osteoarthritis of both knees M17.0     Other Visit Diagnoses         Codes      Post-operative pain     G89.18          1. Post-operative pain  Follow Up In Physical Therapy      2. Primary osteoarthritis of both knees  Follow Up In Physical Therapy               Surgery:  Rt TKR, 3-28-25  HPI:  Degenerative, primary OA B knees, genu varus B       Subjective:  Pt stated she feels the Tramadol is no longer effective.  She starts her clinic for her RT 5-28-25    Patient's Living Environment/PLOF: Independent with all ADL's prior to current injury, lives with spouse who is a olivera.  She works as a  for [employer].  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      Home Living: No concerns, lives in split level, has double railing on all stairs. 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  (HTN, OA Rt knee)     Precautions:  WBAT     Objective     Swelling/Girth:  per visual inspection, mild through Rt knee  Palpation:  minimal to none tenderness to palpation    HEP Compliance:  100%  Transfers:  + use of hands to push up from low surfaces    Outcome Measures:  LEFS: 33/80, 41.25%    Follow Up with MD:  4-11-25    Treatments:     Access Code: [email or web address]    Rt Knee AROM:  0-112/114 with overpressure, pain at end range  Lt Knee AROM:  0-112/116 with overpressure pain at end range    Exercises    - LAQ's,  3 x 10, 2 lbs  - SLS,  3 x 15 sec hold  - Total Gym Squats, 3 x 10   - Bike, Level 1, 5 Min    SciFit, Level 1, 5 Min    Heel Slides, 10 x 5 sec, B    TKE, 50 lbs, 3 sec hold    Sit to Std, 1 blue foam, 2 x 10    6 inch step ups, 2 x 10    Hamstrings Curls, Black, 2 x 15    Hamstring Curls, 2 lbs, B    Rockerboard, 20 x    Gastroc Stretch, 3 x 30     Assessment:     Pt is progressing w/o issues.  She will be 6 wks post op this Friday.  Functionally she is tolerating all activities without adverse effects.  She is progressing week to week with over loads & activity tolerances.        Plan: Continue per POC & pt tolerance.  Frequency/duration: 2x/wk for 12 visits

## 2025-05-08 ENCOUNTER — DOCUMENTATION (OUTPATIENT)
Dept: OBSTETRICS AND GYNECOLOGY | Facility: CLINIC | Age: 45
End: 2025-05-08
Payer: COMMERCIAL

## 2025-05-08 NOTE — PROGRESS NOTES
PA submitted for drospirenone (contraceptive) 4 mg (28) tablet   Key: [id number]  PA Case ID #: [id number]  [employer] Electronic PA Form (2017 NCPDP)  Ines iPckett RN

## 2025-05-09 ENCOUNTER — APPOINTMENT (OUTPATIENT)
Dept: ORTHOPEDIC SURGERY | Facility: CLINIC | Age: 45
End: 2025-05-09
Payer: COMMERCIAL

## 2025-05-09 DIAGNOSIS — G89.18 POST-OP PAIN: Primary | ICD-10-CM

## 2025-05-09 NOTE — PROGRESS NOTES
History of Present Illness:  Patient returns today endorsing mild pain.  Patient notes improving functional status. She is s/p B/L TKA, more recently the right knee.  He states that both knees are doing well but the left knee feels slightly more stiff to deep flexion.  She is working very diligently with physical therapy on this, overall doing well.    Physical Examination:  Well healed incision   ROM: 0-110 R, 0-105 L  No laxity to varus / valgus stress  + Plantar/dorsiflexion  Negative Madalyn test    Assessment:  Patient status post bilateral total knee arthroplasty, doing well    Plan:  Discussed analgesics.  Reviewed range of motion, strength goals.  Discussed activities to avoid  Dental prophylaxis discussed.  Follow-up:  2 months for the B/L knees    Prashanth Alvarez PA-C

## 2025-05-12 ENCOUNTER — TREATMENT (OUTPATIENT)
Dept: PHYSICAL THERAPY | Facility: CLINIC | Age: 45
End: 2025-05-12
Payer: COMMERCIAL

## 2025-05-12 DIAGNOSIS — G89.18 POST-OPERATIVE PAIN: ICD-10-CM

## 2025-05-12 DIAGNOSIS — M17.0 PRIMARY OSTEOARTHRITIS OF BOTH KNEES: ICD-10-CM

## 2025-05-12 PROCEDURE — 97110 THERAPEUTIC EXERCISES: CPT | Mod: GP

## 2025-05-12 NOTE — PROGRESS NOTES
Physical Therapy Treatment Note     Patient Name: Keiry Kline  MRN: [id number]  Today's Date: 5/12/2025     Time Calculation  Start Time: 1000  Stop Time: 1045  Time Calculation (min): 45 min  PT Therapeutic Procedures Time Entry  Therapeutic Exercise Time Entry: 45    Visit: 12/MN this year,  6 for Rt Knee  Referred by:  Carlitos Gallagher MD, Referring Provider  Referral#:  [id number]     Insurance:   [employer] P, ded $3300 20% coins, oop $4500 $0 copay. No auth  Certification Dates:  4-23-25 to 7-2-25  Current Problem:   Problem List Items Addressed This Visit           ICD-10-CM       Musculoskeletal and Injuries    Primary osteoarthritis of both knees M17.0     Other Visit Diagnoses         Codes      Post-operative pain     G89.18          1. Post-operative pain  Follow Up In Physical Therapy      2. Primary osteoarthritis of both knees  Follow Up In Physical Therapy               Surgery:  Rt TKR, 3-28-25  HPI:  Degenerative, primary OA B knees, genu varus B       Subjective:  Pt stated she feels she is getting better from week to week.      Patient's Living Environment/PLOF: Independent with all ADL's prior to current injury, lives with spouse who is a olivera.  She works as a  for [employer].  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      Home Living: No concerns, lives in split level, has double railing on all stairs. 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 comorbidities (HTN, OA Rt knee)     Precautions:   WBAT     Objective     Swelling/Girth:  per visual inspection, mild through Rt knee  Palpation:  minimal to none tenderness to palpation    HEP Compliance:  100%  Transfers:  + use of hands to push up from low surfaces    Outcome Measures:  LEFS: 33/80, 41.25%    Follow Up with MD:  4-11-25    Treatments:     Access Code: [email or web address]    Rt Knee AROM:  0-118 with overpressure, pain at end range  Lt Knee AROM:  0-112/116 with overpressure pain at end range    Exercises    - LAQ's,  3 x 10, 2.5 lbs  - SLS,  3 x 15 sec hold  - Total Gym Squats, 3 x 10   - Bike, Level 1, 5 Min    SciFit, Level 1, 5 Min    Heel Slides, 10 x 5 sec, B    TKE, 50 lbs, 3 sec hold    Sit to Std, 1 blue foam, 2 x 10    6 inch step ups, 2 x 10    Hamstrings Curls, Black, 2 x 15    Hamstring Curls, 2 lbs, B    Rockerboard, 20 x    Gastroc Stretch, 3 x 30     Assessment:     Pt is progressing w/o issues.  She was placed back on the Meloxicam to reduce inflammation.  Functionally she is tolerating all activities without adverse effects.  She reports she still has moments where the knees are throbbing & aching, however, she is progressing week to week with over loads & activity tolerances.        Plan: Continue per POC & pt tolerance.  Frequency/duration: 2x/wk for 12 visits

## 2025-05-14 ENCOUNTER — TREATMENT (OUTPATIENT)
Dept: PHYSICAL THERAPY | Facility: CLINIC | Age: 45
End: 2025-05-14
Payer: COMMERCIAL

## 2025-05-14 DIAGNOSIS — M17.0 PRIMARY OSTEOARTHRITIS OF BOTH KNEES: ICD-10-CM

## 2025-05-14 DIAGNOSIS — G89.18 POST-OPERATIVE PAIN: ICD-10-CM

## 2025-05-14 PROCEDURE — 97530 THERAPEUTIC ACTIVITIES: CPT | Mod: GP

## 2025-05-14 PROCEDURE — 97110 THERAPEUTIC EXERCISES: CPT | Mod: GP

## 2025-05-14 NOTE — PROGRESS NOTES
Physical Therapy Treatment Note     Patient Name: Keiry Kline  MRN: [id number]  Today's Date: 5/14/2025     Time Calculation  Start Time: 1000  Stop Time: 1045  Time Calculation (min): 45 min  PT Therapeutic Procedures Time Entry  Therapeutic Exercise Time Entry: 35  Therapeutic Activity Time Entry: 10    Visit: 13/MN this year,  7 for Rt Knee  Referred by:  Carlitos Gallagher MD, Referring Provider  Referral#:  [id number]     [facility]% coins, oop $4500 $0 copay. No auth  Certification Dates:  4-23-25 to 7-2-25  Current Problem:   Problem List Items Addressed This Visit           ICD-10-CM       Musculoskeletal and Injuries    Primary osteoarthritis of both knees M17.0     Other Visit Diagnoses         Codes      Post-operative pain     G89.18          1. Post-operative pain  Follow Up In Physical Therapy      2. Primary osteoarthritis of both knees  Follow Up In Physical Therapy               Surgery:  Rt TKR, 3-28-25  HPI:  Degenerative, primary OA B knees, genu varus B       Subjective:  Pt stated she feels she is getting better from week to week.      Patient's Living Environment/PLOF: Independent with all ADL's prior to current injury, lives with spouse who is a olivera.  She works as a  for [employer].  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      Home Living: No concerns, lives in split level, has double railing on all stairs. 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 comorbidities  (HTN, OA Rt knee)     Precautions:  WBAT     Objective     Swelling/Girth:  per visual inspection, mild through Rt knee  Palpation:  minimal to none tenderness to palpation    HEP Compliance:  100%  Transfers:  + use of hands to push up from low surfaces    Outcome Measures:  LEFS: 33/80, 41.25%    Follow Up with MD:  4-11-25    Treatments:     Access Code: [email or web address]    Rt Knee AROM:  0-120 with overpressure, pain at end range  Lt Knee AROM:  0-117 with overpressure pain at end range    Exercises    - LAQ's,  3 x 10, 2.5 lbs  - SLS,  3 x 15 sec hold  - Total Gym Squats, 3 x 10   - Bike, Level 1, 5 Min    SciFit, Level 1, 5 Min    Heel Slides, 10 x 5 sec, B    TKE, 50 lbs, 3 sec hold    Hamstrings Curls, Black, 2 x 15    Hamstring Curls, 2 lbs, B    Rockerboard, 20 x    Gastroc Stretch, 3 x 30        Therapeutic Activity      Sit to Std, 1 blue foam, 2 x 10    6 inch step ups, 2 x 10    Assessment:     Pt is progressing w/o issues.  Functionally she is tolerating all activities without adverse effects.      Plan: Continue per POC & pt tolerance.  Frequency/duration: 2x/wk for 12 visits

## 2025-05-19 ENCOUNTER — TREATMENT (OUTPATIENT)
Dept: PHYSICAL THERAPY | Facility: CLINIC | Age: 45
End: 2025-05-19
Payer: COMMERCIAL

## 2025-05-19 DIAGNOSIS — M17.0 PRIMARY OSTEOARTHRITIS OF BOTH KNEES: ICD-10-CM

## 2025-05-19 DIAGNOSIS — G89.18 POST-OPERATIVE PAIN: ICD-10-CM

## 2025-05-19 PROCEDURE — 97530 THERAPEUTIC ACTIVITIES: CPT | Mod: GP

## 2025-05-19 PROCEDURE — 97110 THERAPEUTIC EXERCISES: CPT | Mod: GP

## 2025-05-19 NOTE — PROGRESS NOTES
Physical Therapy Treatment Note     Patient Name: Keiry Kline  MRN: [id number]  Today's Date: 5/19/2025     Time Calculation  Start Time: 1045  Stop Time: 1130  Time Calculation (min): 45 min  PT Therapeutic Procedures Time Entry  Therapeutic Exercise Time Entry: 30  Therapeutic Activity Time Entry: 15    Visit: 14/MN this year,  8 for Rt Knee  Referred by:  Carlitos Gallagher MD, Referring Provider  Referral#:  [id number]     [facility]   [facility], oop $4500 $0 copay. No auth  Certification Dates:  4-23-25 to 7-2-25  Current Problem:   Problem List Items Addressed This Visit           ICD-10-CM       Musculoskeletal and Injuries    Primary osteoarthritis of both knees M17.0     Other Visit Diagnoses         Codes      Post-operative pain     G89.18          1. Post-operative pain  Follow Up In Physical Therapy      2. Primary osteoarthritis of both knees  Follow Up In Physical Therapy               Surgery:  Rt TKR, 3-28-25  HPI:  Degenerative, primary OA B knees, genu varus B       Subjective:  Pt stated she feels good after starting back on the Meloxicam.        Patient's Living Environment/PLOF: Independent with all ADL's prior to current injury, lives with spouse who is a olivera.  She works as a  for [employer].  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      Home Living: No concerns, lives in split level, has double railing on all stairs. 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  comorbidities (HTN, OA Rt knee)     Precautions:  WBAT     Objective     Swelling/Girth:  per visual inspection, mild through Rt knee  Palpation:  minimal to none tenderness to palpation    HEP Compliance:  100%  Transfers:  + use of hands to push up from low surfaces    Outcome Measures:  LEFS: 33/80, 41.25%    Follow Up with MD:  4-11-25    Treatments:     Access Code: [email or web address]    Rt Knee AROM:  0-122 with overpressure, pain at end range  Lt Knee AROM:  0-118 with overpressure pain at end range    Exercises    - LAQ's,  3 x 10, 3 lbs  - SLS,  3 x 15 sec hold  - Total Gym Squats, 3 x 10   - Bike, Level 1, 5 Min    SciFit, Level 1, 5 Min    Heel Slides, 10 x 5 sec, B    TKE, 50 lbs, 3 sec hold    Hamstrings Curls, Black, 2 x 15    Hamstring Curls, 3 lbs, B    Rockerboard, 20 x    Gastroc/Soleus Stretch, 3 x 30        Therapeutic Activity      Sit to Std, 1 blue foam, 2 x 10    6 inch step ups, 2 x 1 5 B    Inchworms, 3 lbs, B, 2 x 25 feet    Assessment:     Pt is progressing w/o issues.  Functionally she is doing better.  She states after starting back to the Meloxicam, she is able to bend better. B knees remain stiff after prolonged postures.        Plan: Continue per POC & pt tolerance.  Frequency/duration: 2x/wk for 12 visits

## 2025-05-21 ENCOUNTER — TREATMENT (OUTPATIENT)
Dept: PHYSICAL THERAPY | Facility: CLINIC | Age: 45
End: 2025-05-21
Payer: COMMERCIAL

## 2025-05-21 DIAGNOSIS — M17.0 PRIMARY OSTEOARTHRITIS OF BOTH KNEES: ICD-10-CM

## 2025-05-21 DIAGNOSIS — G89.18 POST-OPERATIVE PAIN: ICD-10-CM

## 2025-05-21 PROCEDURE — 97110 THERAPEUTIC EXERCISES: CPT | Mod: GP

## 2025-05-21 PROCEDURE — 97530 THERAPEUTIC ACTIVITIES: CPT | Mod: GP

## 2025-05-21 NOTE — PROGRESS NOTES
Physical Therapy Treatment Note     Patient Name: Keiry Kline  MRN: [id number]  Today's Date: 5/21/2025     Time Calculation  Start Time: 1000  Stop Time: 1045  Time Calculation (min): 45 min  PT Therapeutic Procedures Time Entry  Therapeutic Exercise Time Entry: 30  Therapeutic Activity Time Entry: 15    Visit: 15/MN this year,  9 for Rt Knee  Referred by:  Carlitos Gallagher MD, Referring Provider  Referral#:  [id number]     [facility], oop $4500 $0 copay. No auth  Certification Dates:  4-23-25 to 7-2-25  Current Problem:   Problem List Items Addressed This Visit           ICD-10-CM       Musculoskeletal and Injuries    Primary osteoarthritis of both knees M17.0     Other Visit Diagnoses         Codes      Post-operative pain     G89.18          1. Post-operative pain  Follow Up In Physical Therapy      2. Primary osteoarthritis of both knees  Follow Up In Physical Therapy               Surgery:  Rt TKR, 3-28-25  HPI:  Degenerative, primary OA B knees, genu varus B       Subjective:  Pt stated she feels the knees are a little stiffer today due to the rain.       Patient's Living Environment/PLOF: Independent with all ADL's prior to current injury, lives with spouse who is a olivera.  She works as a  for [employer].  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      Home Living: No concerns, lives in split level, has double railing on all stairs. One step into house, has railing to use  Patient's Therapy Goals: To decrease pain, return to PLOF     Pain:  Intensity:  1-2/10, surgical             Location: Rt knee, diffuse, Lt TKR healed with keloid after             Duration:  intermittent            Aggravating Factor: prolonged WB, positional, end ranges            Alleviating Factor:  Pain med, rest, ice            Clinical Progression: Gradually improving     Social Factors:  1 personal factor (FT student  &/1-2 comorbidities (HTN, OA Rt knee)     Precautions:  WBAT     Objective     Swelling/Girth:  per visual inspection, mild through Rt knee  Palpation:  minimal to none tenderness to palpation    HEP Compliance:  100%  Transfers:  + use of hands to push up from low surfaces    Outcome Measures:  LEFS: 33/80, 41.25% (Eval)    Treatments:     Access Code: [email or web address]    Rt Knee AROM:  0-122 with overpressure, pain at end range  Lt Knee AROM:  0-118 with overpressure pain at end range (Best measurement), today was 115    Exercises    - LAQ's,  3 x 10, 3 lbs  - SLS,  3 x 15 sec hold  - Total Gym Squats, 3 x 10   - Bike, Level 1, 5 Min    SciFit, Level 1, 5 Min    Heel Slides, 10 x 5 sec, B    TKE, 60 lbs, 3 sec hold    Hamstrings Curls, Black, 2 x 15    Hamstring Curls, 3 lbs, B    Rockerboard, 20 x    Gastroc/Soleus Stretch, 3 x 30        Therapeutic Activity      Sit to Std, 1 blue foam, 2 x 10    6 inch step ups, 2 x 1 5 B    Inchworms, 3 lbs, B, 2 x 25 feet    Resisted Cable Walks, 80 lbs, 5 x each, bkwd, laterally    Assessment:     Pt is progressing w/o issues.  Added exercises as noted above for functional strengthening.   B knees remain stiff after prolonged postures.        Plan: Continue per POC & pt tolerance.  Frequency/duration: 2x/wk for 12 visits

## 2025-05-29 ENCOUNTER — PATIENT MESSAGE (OUTPATIENT)
Dept: ORTHOPEDIC SURGERY | Facility: CLINIC | Age: 45
End: 2025-05-29
Payer: COMMERCIAL

## 2025-05-29 DIAGNOSIS — Z96.651 STATUS POST TOTAL KNEE REPLACEMENT, RIGHT: ICD-10-CM

## 2025-05-29 DIAGNOSIS — G89.18 POST-OP PAIN: ICD-10-CM

## 2025-05-29 RX ORDER — MELOXICAM 15 MG/1
15 TABLET ORAL DAILY
Qty: 30 TABLET | Refills: 0 | Status: SHIPPED | OUTPATIENT
Start: 2025-05-29 | End: 2026-05-29

## 2025-05-29 RX ORDER — TRAMADOL HYDROCHLORIDE 50 MG/1
50 TABLET, FILM COATED ORAL EVERY 12 HOURS PRN
Qty: 14 TABLET | Refills: 0 | Status: SHIPPED | OUTPATIENT
Start: 2025-05-29 | End: 2025-06-05

## 2025-06-04 ENCOUNTER — TREATMENT (OUTPATIENT)
Dept: PHYSICAL THERAPY | Facility: CLINIC | Age: 45
End: 2025-06-04
Payer: COMMERCIAL

## 2025-06-04 DIAGNOSIS — G89.18 POST-OPERATIVE PAIN: ICD-10-CM

## 2025-06-04 DIAGNOSIS — M17.0 PRIMARY OSTEOARTHRITIS OF BOTH KNEES: Primary | ICD-10-CM

## 2025-06-04 PROCEDURE — 97530 THERAPEUTIC ACTIVITIES: CPT | Mod: GP

## 2025-06-04 PROCEDURE — 97110 THERAPEUTIC EXERCISES: CPT | Mod: GP

## 2025-06-04 NOTE — PROGRESS NOTES
Physical Therapy Treatment / PN Note     Patient Name: Keiry Kline  MRN: [id number]  Today's Date: 6/4/2025     Time Calculation  Start Time: 0700  Stop Time: 0745  Time Calculation (min): 45 min  PT Therapeutic Procedures Time Entry  Therapeutic Exercise Time Entry: 30  Therapeutic Activity Time Entry: 15    Visit: 10/MN this year,  10 for Rt Knee  Referred by:  Carlitos Gallagher MD, Referring Provider  Referral#:  [id number]     Insurance:   [facility], ded $3300 20% coins, oop $4500 $0 copay. No auth  Certification Dates:  4-23-25 to 7-2-25  Current Problem:   Problem List Items Addressed This Visit           ICD-10-CM       Musculoskeletal and Injuries    Primary osteoarthritis of both knees - Primary M17.0     Other Visit Diagnoses         Codes      Post-operative pain     G89.18          1. Primary osteoarthritis of both knees  Follow Up In Physical Therapy      2. Post-operative pain  Follow Up In Physical Therapy               Surgery:  Rt TKR, 3-28-25  HPI:  Degenerative, primary OA B knees, genu varus B       Subjective:  Pt stated she feels both knees are stiff, especially in the AM.  The weather also plays a role.        Patient's Living Environment/PLOF: Independent with all ADL's prior to current injury, lives with spouse who is a olivera.  She works as a  for [employer].  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      Home Living: No concerns, lives in split level, has double railing on all stairs. One step into house, has railing to use  Patient's Therapy Goals: To decrease pain, return to PLOF     Pain:  Intensity:  1-2/10, surgical             Location: Rt knee, diffuse, Lt TKR healed with keloid after             Duration:  intermittent            Aggravating Factor: prolonged WB, positional, end ranges            Alleviating Factor:  Pain meds, rest, ice            Clinical Progression: Gradually improving     Social  Factors:  1 personal factor (FT student &/1-2 comorbidities (HTN, OA Rt knee)     Precautions:  WBAT     Objective     Swelling/Girth:  per visual inspection, mild through Rt knee  Palpation:  minimal to none tenderness to palpation    HEP Compliance:  100%  Transfers:  use of hands to push up from low surfaces    Outcome Measures:  LEFS: 33/80, 41.25% (Eval), PN 51/80,   63.75%    Treatments:     Access Code: [email or web address]    Rt Knee AROM:  0-122 with overpressure, pain at end range  Lt Knee AROM:  0-122 with overpressure pain at end range     Exercises    - LAQ's,  3 x 10, 3 lbs  - SLS,  3 x 15 sec hold  - Total Gym Squats, 3 x 10   - Bike, Level 1, 5 Min    Heel Slides, 10 x 5 sec, B    TKE, 70 lbs, 3 sec hold    Hamstring Curls, 3 lbs, B    Rockerboard, 20 x    Gastroc/Soleus Stretch, 3 x 30        Therapeutic Activity      3 way, Sit to Std, 2 x 10    6 inch step ups, 2 x 15 B    Inchworms, 3 lbs, B, 2 x 25 feet    Reverse Lunges, 10 x each    Assessment:     Pt is progressing w/o issues and tolerating functional strengthening.  She is not back to work yet but she's trying to get more time on her feet prior to RTW.  B knees remain stiff after prolonged postures and in the AM.  She is progressing towards all goals.      Plan: Continue per POC & pt tolerance.  Frequency/duration: 2x/wk for 12 visits      Goals:   Active       PT Problem       PT Goal 1 Progressing      Start:  02/19/25    Expected End:  07/02/25       Pt independent with HEP and consistent for optimal recovery and self-management after DC from PT.           PT Goal 2  Met      Start:  02/19/25    Expected End:  07/02/25       Pt will improve Rt knee AROM to 0-120 to improve functional mobility for ADL's, school &  work.         PT Goal 3  Progressing      Start:  02/19/25    Expected End:  07/02/25       Patient will increase Rt knee / LE strength to +4/5 to improve functional mobility for ADL's, school & work.            PT Goal 4  Progressing       Start:  02/19/25    Expected End:  07/02/25       Pt will increase LEFS score to > or = 80% to demonstrate functional improvement.

## 2025-06-05 ENCOUNTER — TREATMENT (OUTPATIENT)
Dept: PHYSICAL THERAPY | Facility: CLINIC | Age: 45
End: 2025-06-05
Payer: COMMERCIAL

## 2025-06-05 DIAGNOSIS — M17.0 PRIMARY OSTEOARTHRITIS OF BOTH KNEES: ICD-10-CM

## 2025-06-05 DIAGNOSIS — G89.18 POST-OPERATIVE PAIN: ICD-10-CM

## 2025-06-05 PROCEDURE — 97110 THERAPEUTIC EXERCISES: CPT | Mod: GP

## 2025-06-05 PROCEDURE — 97530 THERAPEUTIC ACTIVITIES: CPT | Mod: GP

## 2025-06-09 ENCOUNTER — TREATMENT (OUTPATIENT)
Dept: PHYSICAL THERAPY | Facility: CLINIC | Age: 45
End: 2025-06-09
Payer: COMMERCIAL

## 2025-06-09 DIAGNOSIS — M17.0 PRIMARY OSTEOARTHRITIS OF BOTH KNEES: ICD-10-CM

## 2025-06-09 DIAGNOSIS — G89.18 POST-OPERATIVE PAIN: Primary | ICD-10-CM

## 2025-06-09 PROCEDURE — 97110 THERAPEUTIC EXERCISES: CPT | Mod: GP

## 2025-06-09 PROCEDURE — 97530 THERAPEUTIC ACTIVITIES: CPT | Mod: GP

## 2025-06-09 NOTE — PROGRESS NOTES
Physical Therapy Treatment Note     Patient Name: Keiry Kline  MRN: [id number]  Today's Date: 6/9/2025     Time Calculation  Start Time: 0700  Stop Time: 0745  Time Calculation (min): 45 min  PT Therapeutic Procedures Time Entry  Therapeutic Exercise Time Entry: 30  Therapeutic Activity Time Entry: 15    Visit: 12/MN this year,  10 for Rt Knee  Referred by:  Carlitos Gallagher MD, Referring Provider  Referral#:  [id number]     [facility], oop $4500 $0 copay. No auth  Certification Dates:  4-23-25 to 7-2-25  Current Problem:   Problem List Items Addressed This Visit           ICD-10-CM       Musculoskeletal and Injuries    Primary osteoarthritis of both knees M17.0     Other Visit Diagnoses         Codes      Post-operative pain    -  Primary G89.18          1. Post-operative pain        2. Primary osteoarthritis of both knees           Surgery:  Rt TKR, 3-28-25  HPI:  Degenerative, primary OA B knees, genu varus B       Subjective:  No new issues this morning.          Patient's Living Environment/PLOF: Independent with all ADL's prior to current injury, lives with spouse who is a olivera.  She works as a  for [employer].  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      Home Living: No concerns, lives in split level, has double railing on all stairs. One step into house, has railing to use  Patient's Therapy Goals: To decrease pain, return to PLOF     Pain:  Intensity:  1-2/10, surgical             Location: Rt knee, diffuse, Lt TKR healed with keloid after             Duration:  intermittent            Aggravating Factor: prolonged WB, positional, end ranges            Alleviating Factor:  Pain meds, rest, ice            Clinical Progression: Gradually improving     Social Factors:  1 personal factor (FT student &/1-2 comorbidities (HTN, OA Rt knee)     Precautions:  WBAT     Objective     Swelling/Girth:  per  visual inspection, mild through Rt knee  Palpation:  minimal to none tenderness to palpation    HEP Compliance:  100%  Transfers:  use of hands to push up from low surfaces    Outcome Measures:  LEFS: 33/80, 41.25% (Eval), PN 51/80,   63.75%    Treatments:     Access Code: [email or web address]    Rt Knee AROM:  0-125 with overpressure, pain at end range  Lt Knee AROM:  0-122 with overpressure pain at end range     Rt Hamstrings:  -8  Lt Hamstrings:  -18    Exercises    - Bike, Level 1, 5 Min    Heel Slides, 10 x 5 sec, B    TKE, 70 lbs, 3 sec hold    Hamstring Curls, 3 lbs, B    Rockerboard, 20 x    Gastroc/Soleus Stretch, 3 x 30        Therapeutic Activity      3 way, Sit to Std, 2 x 10    6 inch step ups, 2 x 15 B    Inchworms, 3 lbs, B, 2 x 25 feet    Cable Walkouts, 80 lbs, 10 x lateral, 5 x bkwd    Hip Ext, Add, B., 50 lbs, 2 x 15    Hip Abd, 40 lbs, 2 x 15    Inchworms & W's, 3 lbs, 2 x 25 feet each    Assessment:     Pt is progressing well per post op course.  She has her usual c/o's of stiffness with prolonged postures, especially mornings.        Plan: Continue per POC & pt tolerance.  Frequency/duration: 2x/wk for 12 visits

## 2025-06-12 ENCOUNTER — TREATMENT (OUTPATIENT)
Dept: PHYSICAL THERAPY | Facility: CLINIC | Age: 45
End: 2025-06-12
Payer: COMMERCIAL

## 2025-06-12 DIAGNOSIS — G89.18 POST-OPERATIVE PAIN: ICD-10-CM

## 2025-06-12 DIAGNOSIS — M17.0 PRIMARY OSTEOARTHRITIS OF BOTH KNEES: ICD-10-CM

## 2025-06-12 PROCEDURE — 97530 THERAPEUTIC ACTIVITIES: CPT | Mod: GP

## 2025-06-12 PROCEDURE — 97110 THERAPEUTIC EXERCISES: CPT | Mod: GP

## 2025-06-12 NOTE — PROGRESS NOTES
`    Physical Therapy Treatment Note     Patient Name: Keiry Kline  MRN: [id number]  Today's Date: 6/12/2025     Time Calculation  Start Time: 1445  Stop Time: 1530  Time Calculation (min): 45 min  PT Therapeutic Procedures Time Entry  Therapeutic Exercise Time Entry: 30  Therapeutic Activity Time Entry: 15    Visit: 13/MN this year,  10 for Rt Knee  Referred by:  Carlitos Gallagher MD, Referring Provider  Referral#:  [id number]     Insurance:   [facility], ded $3300 20% coins, oop $4500 $0 copay. No auth  Certification Dates:  4-23-25 to 7-2-25  Current Problem:   Problem List Items Addressed This Visit           ICD-10-CM       Musculoskeletal and Injuries    Primary osteoarthritis of both knees M17.0     Other Visit Diagnoses         Codes      Post-operative pain     G89.18          1. Post-operative pain  Follow Up In Physical Therapy      2. Primary osteoarthritis of both knees  Follow Up In Physical Therapy         Surgery:  Rt TKR, 3-28-25  HPI:  Degenerative, primary OA B knees, genu varus B       Subjective:  No new issues this morning.          Patient's Living Environment/PLOF: Independent with all ADL's prior to current injury, lives with spouse who is a olivera.  She works as a  for [employer].  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      Home Living: No concerns, lives in split level, has double railing on all stairs. One step into house, has railing to use  Patient's Therapy Goals: To decrease pain, return to PLOF     Pain:  Intensity:  1-2/10, surgical             Location: Rt knee, diffuse, Lt TKR healed with keloid after             Duration:  intermittent            Aggravating Factor: prolonged WB, positional, end ranges            Alleviating Factor:  Pain meds, rest, ice            Clinical Progression: Gradually improving     Social Factors:  1 personal factor (FT student &/1-2 comorbidities (HTN, OA Rt knee)      Precautions:  WBAT     Objective     Swelling/Girth:  per visual inspection, mild through Rt knee  Palpation:  minimal to none tenderness to palpation    HEP Compliance:  100%  Transfers:  use of hands to push up from low surfaces    Outcome Measures:  LEFS: 33/80, 41.25% (Eval), PN 51/80,   63.75%    Treatments:     Access Code: [email or web address]    Rt Knee AROM:  0-125 with overpressure, pain at end range  Lt Knee AROM:  0-122 with overpressure pain at end range     Rt Hamstrings:  -8  Lt Hamstrings:  -16  Rt MMT:  Quads/Hams +4/5, Hip Flex/Ext +4/5, Hip Abd 4/5    Exercises    - Bike, Level 1, 5 Min    Heel Slides, 10 x 5 sec, B    TKE, 70 lbs, 3 sec hold    Gastroc/Soleus Stretch, 3 x 30    Total Gym Squats, BW + 15 lbs, 30 x, BW, SL 20 x each        Therapeutic Activity      3 way, Sit to Std, 2 x 10    6 inch step ups, 2 x 15 B    Inchworms, Monster Walks & W's, Green, 2 x 25 feet each    Assessment:     Pt is progressing well per post op course.  She has her usual c/o's of stiffness with prolonged postures, especially mornings.  She was very tired today, slight decrease in activities.      Plan: Continue per POC & pt tolerance.  Frequency/duration: 2x/wk for 12 visits

## 2025-06-16 ENCOUNTER — TREATMENT (OUTPATIENT)
Dept: PHYSICAL THERAPY | Facility: CLINIC | Age: 45
End: 2025-06-16
Payer: COMMERCIAL

## 2025-06-16 DIAGNOSIS — M17.0 PRIMARY OSTEOARTHRITIS OF BOTH KNEES: ICD-10-CM

## 2025-06-16 DIAGNOSIS — G89.18 POST-OPERATIVE PAIN: Primary | ICD-10-CM

## 2025-06-16 PROCEDURE — 97110 THERAPEUTIC EXERCISES: CPT | Mod: GP

## 2025-06-16 PROCEDURE — 97530 THERAPEUTIC ACTIVITIES: CPT | Mod: GP

## 2025-06-16 NOTE — PROGRESS NOTES
`    Physical Therapy Treatment Note     Patient Name: Keiry Kline  MRN: [id number]  Today's Date: 6/16/2025     Time Calculation  Start Time: 0700  Stop Time: 0745  Time Calculation (min): 45 min  PT Therapeutic Procedures Time Entry  Therapeutic Exercise Time Entry: 30  Therapeutic Activity Time Entry: 15    Visit: 14/MN this year, 10 for Rt Knee  Referred by:  Carlitos Gallagher MD, Referring Provider  Referral#:  [id number]     [facility]% [facility], oop $4500 $0 copay. No auth  Certification Dates:  4-23-25 to 7-2-25  Current Problem:   Problem List Items Addressed This Visit           ICD-10-CM       Musculoskeletal and Injuries    Primary osteoarthritis of both knees M17.0     Other Visit Diagnoses         Codes      Post-operative pain    -  Primary G89.18          1. Post-operative pain        2. Primary osteoarthritis of both knees           Surgery:  Rt TKR, 3-28-25  HPI:  Degenerative, primary OA B knees, genu varus B       Subjective:  No new issues this morning.          Patient's Living Environment/PLOF: Independent with all ADL's prior to current injury, lives with spouse who is a olivera.  She works as a  for [employer].  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      Home Living: No concerns, lives in split level, has double railing on all stairs. 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 comorbidities (HTN, OA Rt knee)     Precautions:  WBAT     Objective     Swelling/Girth:  per visual  inspection, mild through Rt knee  Palpation:  minimal to none tenderness to palpation    HEP Compliance:  100%  Transfers:  does not need to use hands to push up from low surfaces    Outcome Measures:  LEFS: 33/80, 41.25% (Eval), PN 51/80,   63.75%    Treatments:     Access Code: [email or web address]    Rt Knee AROM:  0-125 with overpressure, pain at end range  Lt Knee AROM:  0-124 with overpressure pain at end range     Exercises    - Bike, Level 1, 5 Min    Heel Slides, 10 x 5 sec, B    Gastroc/Soleus Stretch, 3 x 30    Total Gym Squats, BW, 30 x, BW, SL 20 x each        Therapeutic Activity      3 way, Sit to Std, 2 x 10    6 inch step ups, 2 x 15 B    Inchworms, Monster Walks & W's, Green, 2 x 25 feet each    Assessment:     Pt is progressing well per post op course.  She has her usual c/o's of stiffness with prolonged postures, especially mornings.  She is not limited by knee pain with functional activities, but repetitive prolonged activity is fatiguing.  She is questioning if she will be able to RTW FT, full duty.      Plan: Continue per POC & pt tolerance.  Frequency/duration: 2x/wk for 12 visits

## 2025-06-17 ENCOUNTER — OFFICE VISIT (OUTPATIENT)
Dept: ORTHOPEDIC SURGERY | Facility: CLINIC | Age: 45
End: 2025-06-17
Payer: COMMERCIAL

## 2025-06-17 DIAGNOSIS — M17.0 ARTHRITIS OF BOTH KNEES: Primary | ICD-10-CM

## 2025-06-17 PROCEDURE — 99024 POSTOP FOLLOW-UP VISIT: CPT | Performed by: ORTHOPAEDIC SURGERY

## 2025-06-17 PROCEDURE — 99212 OFFICE O/P EST SF 10 MIN: CPT | Performed by: ORTHOPAEDIC SURGERY

## 2025-06-17 NOTE — PROGRESS NOTES
History of Present Illness:  Patient returns today endorsing mild pain.  Patient notes improving functional status.  She states the right has been an easy recovery.    Physical Examination:  Well healed incision   ROM: Rt 0-125, Left 0-120+  No laxity to varus / valgus stress  + Plantar/dorsiflexion  Negative Madalyn test    Assessment:  Patient status post bilateral total knee arthroplasty, doing well    Plan:  Discussed analgesics.  Reviewed range of motion, strength goals.  Discussed activities to avoid  Dental prophylaxis discussed.  Follow-up:  1 month

## 2025-06-17 NOTE — LETTER
June 17, 2025     Patient: Keiry Kline   YOB: 1980   Date of Visit: 6/17/2025       To Whom It May Concern:    It is my medical opinion that Keiry Kline should remain out of work until 07/28/2025 pending follow-up for re-evaluation.    If you have any questions or concerns, please don't hesitate to call.         Sincerely,        Carlitos Gallagher MD  Electronic Signature    CC: No Recipients

## 2025-06-18 ENCOUNTER — TREATMENT (OUTPATIENT)
Dept: PHYSICAL THERAPY | Facility: CLINIC | Age: 45
End: 2025-06-18
Payer: COMMERCIAL

## 2025-06-18 DIAGNOSIS — G89.18 POST-OPERATIVE PAIN: ICD-10-CM

## 2025-06-18 DIAGNOSIS — M17.0 PRIMARY OSTEOARTHRITIS OF BOTH KNEES: ICD-10-CM

## 2025-06-18 PROCEDURE — 97110 THERAPEUTIC EXERCISES: CPT | Mod: GP

## 2025-06-18 PROCEDURE — 97530 THERAPEUTIC ACTIVITIES: CPT | Mod: GP

## 2025-06-18 NOTE — PROGRESS NOTES
Physical Therapy Treatment / DC Note     Patient Name: Keiry Kline  MRN: [id number]  Today's Date: 6/18/2025     Time Calculation  Start Time: 1315  Stop Time: 1400  Time Calculation (min): 45 min  PT Therapeutic Procedures Time Entry  Therapeutic Exercise Time Entry: 30  Therapeutic Activity Time Entry: 15    Visit: 14/MN this year, 10 for Rt Knee  Referred by:  Carlitos Gallagher MD, Referring Provider  Referral#:  [id number]     Insurance:   [facility], ded $3300 20% coins, oop $4500 $0 copay. No auth  Certification Dates:  4-23-25 to 7-2-25  Current Problem:   Problem List Items Addressed This Visit           ICD-10-CM       Musculoskeletal and Injuries    Primary osteoarthritis of both knees M17.0     Other Visit Diagnoses         Codes      Post-operative pain     G89.18          1. Post-operative pain  Follow Up In Physical Therapy      2. Primary osteoarthritis of both knees  Follow Up In Physical Therapy         Surgery:  Rt TKR, 3-28-25  HPI:  Degenerative, primary OA B knees, genu varus B       Subjective:  No new issues this morning.          Patient's Living Environment/PLOF: Independent with all ADL's prior to current injury, lives with spouse who is a olivera.  She works as a  for [employer].  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      Home Living: No concerns, lives in split level, has double railing on all stairs. 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 comorbidities (HTN, OA Rt knee)     Precautions:   WBAT     Objective     Swelling/Girth:  per visual inspection, mild through Rt knee  Palpation:  minimal to none tenderness to palpation    HEP Compliance:  100%  Transfers:  does not need to use hands to push up from low surfaces    Outcome Measures:  LEFS: 33/80, 41.25% (Eval), PN 51/80,   63.75%, DC 59/80, 73.75%    Treatments:     Access Code: [email or web address]    Rt Knee AROM:  0-125 with overpressure, pain at end range  Lt Knee AROM:  0-124 with overpressure pain at end range     B LE MMT:  5/5 at hips/knees/ankles    Exercises    - Bike, Level 1, 5 Min    Heel Slides, 10 x 5 sec, B    Gastroc/Soleus Stretch, 3 x 30    Total Gym Squats, BW, 30 x, BW, SL 20 x each        Therapeutic Activity      3 way, Sit to Std, 2 x 10    6 inch step ups, 2 x 15 B    Inchworms, Monster Walks & W's, Green, 2 x 25 feet each    Cable Walks, 85 lbs, 5 x each, bkwd & lateral    SL Squats on High Low Table, 2 x 10 each    Assessment:     Pt has met all of her goals for DC to her HEP.  She is not limited by knee pain with functional activities, but repetitive prolonged activity is fatiguing.  She reports that her disability was extended to July 28th for RTW.   She will reinstate her gym membership to continue working on LE strength.      Plan: DC pt from PT.       Goals:   Active       PT Problem       PT Goal 1 MET      Start:  02/19/25    Expected End:  07/02/25       Pt independent with HEP and consistent for optimal recovery and self-management after DC from PT.           PT Goal 2 MET      Start:  02/19/25    Expected End:  07/02/25       Pt will improve Rt knee AROM to 0-120 to improve functional mobility for ADL's, school &  work.         PT Goal 3 MET      Start:  02/19/25    Expected End:  07/02/25       Patient will increase Rt knee / LE strength to +4/5 to improve functional mobility for ADL's, school & work.            PT Goal 4 MET      Start:  02/19/25    Expected End:  07/02/25       Pt will increase LEFS score to > or = 80%  to demonstrate functional improvement.

## 2025-06-24 ENCOUNTER — APPOINTMENT (OUTPATIENT)
Dept: PHYSICAL THERAPY | Facility: CLINIC | Age: 45
End: 2025-06-24
Payer: COMMERCIAL

## 2025-06-26 ENCOUNTER — APPOINTMENT (OUTPATIENT)
Dept: PHYSICAL THERAPY | Facility: CLINIC | Age: 45
End: 2025-06-26
Payer: COMMERCIAL

## 2025-06-30 DIAGNOSIS — G89.18 POST-OP PAIN: ICD-10-CM

## 2025-06-30 DIAGNOSIS — Z96.651 STATUS POST TOTAL KNEE REPLACEMENT, RIGHT: ICD-10-CM

## 2025-06-30 RX ORDER — MELOXICAM 15 MG/1
15 TABLET ORAL DAILY
Qty: 30 TABLET | Refills: 0 | Status: SHIPPED | OUTPATIENT
Start: 2025-06-30 | End: 2025-07-01

## 2025-07-01 DIAGNOSIS — Z96.651 STATUS POST TOTAL KNEE REPLACEMENT, RIGHT: ICD-10-CM

## 2025-07-01 DIAGNOSIS — G89.18 POST-OP PAIN: ICD-10-CM

## 2025-07-01 RX ORDER — MELOXICAM 15 MG/1
15 TABLET ORAL DAILY
Qty: 30 TABLET | Refills: 0 | Status: SHIPPED | OUTPATIENT
Start: 2025-07-01

## 2025-07-11 ENCOUNTER — APPOINTMENT (OUTPATIENT)
Dept: ORTHOPEDIC SURGERY | Facility: CLINIC | Age: 45
End: 2025-07-11
Payer: COMMERCIAL

## 2025-07-11 DIAGNOSIS — G89.18 POST-OP PAIN: Primary | ICD-10-CM

## 2025-07-11 NOTE — LETTER
July 11, 2025     Patient: Keiry Kline   YOB: 1980   Date of Visit: 7/11/2025       To Whom It May Concern:    It is my medical opinion that Keiry Kline may return to work on 07/19/2025 with the following restrictions: limit work days to a maximum of 8 hours for 3 months, 10/19/2025 to evaluate further.    If you have any questions or concerns, please don't hesitate to call.         Sincerely,        Prashanth Alvarez PA-C  Electronic Signature    CC: No Recipients

## 2025-07-11 NOTE — PROGRESS NOTES
History of Present Illness:  Patient returns today endorsing minimal pain.  Patient notes improving functional status.  She is still building strength and endurance to both knees.  She is working very diligently with physical therapy and home exercises.  Her role at work requires her to stand for about 10 to 14 hours/day, she notes that at this point in time her endurance and muscular strength is not quite at this level.  She is about 6 months out from left total knee, about 3 months out from right total knee.    Physical Examination:  Well healed incision   ROM: Left knee 0-1 20+, right knee less than 5 degrees of full extension, 120 flexion  No laxity to varus / valgus stress  Appropriate strength for postoperative course  + Plantar/dorsiflexion  Negative Madalyn test    Assessment:  Patient status post bilateral total knee arthroplasty, doing well    Plan:  Discussed improvement in strength, swelling over the course of the first year post op.  Discussed return to activities and activities to avoid.  Dental prophylaxis discussed.  Ultimately we did review with the patient that the overall recovery process from total knee arthroplasty can take 6 months to a year.  We do feel that she is progressing very well, however given the demands of her job we do feel that a limitation of hours is very appropriate.  We feel that 8-hour days is sufficient for the patient this point in time in terms of activity level and function.  Will keep these restrictions until we see the patient back for follow-up evaluation.  Follow-up:  2-3 months    Prashanth Alvarez PA-C

## 2025-07-11 NOTE — LETTER
July 11, 2025     Patient: Keiry Kline   YOB: 1980   Date of Visit: 7/11/2025       To Whom it May Concern:    Keiry Kline was seen in my clinic on 7/11/2025. She  may return to work on 07/20/2025 with the following restrictions: limit work days to a maximum of 8 hours for 3 months, 10/19/2025 to evaluate further..    If you have any questions or concerns, please don't hesitate to call.         Sincerely,          Prashanth Alvarez PA-C  Electronic Signature        CC: No Recipients

## 2025-07-16 ENCOUNTER — APPOINTMENT (OUTPATIENT)
Dept: OBSTETRICS AND GYNECOLOGY | Facility: HOSPITAL | Age: 45
End: 2025-07-16
Payer: COMMERCIAL

## 2025-07-28 ENCOUNTER — PATIENT MESSAGE (OUTPATIENT)
Dept: ORTHOPEDIC SURGERY | Facility: CLINIC | Age: 45
End: 2025-07-28
Payer: COMMERCIAL

## 2025-07-28 DIAGNOSIS — Z96.651 STATUS POST TOTAL KNEE REPLACEMENT, RIGHT: ICD-10-CM

## 2025-07-28 DIAGNOSIS — Z96.652 TOTAL KNEE REPLACEMENT STATUS, LEFT: ICD-10-CM

## 2025-07-28 RX ORDER — AMOXICILLIN 500 MG/1
CAPSULE ORAL
Qty: 4 CAPSULE | Refills: 5 | Status: SHIPPED | OUTPATIENT
Start: 2025-07-28

## 2025-08-27 DIAGNOSIS — R03.0 ELEVATED BLOOD PRESSURE READING: ICD-10-CM

## 2025-08-27 RX ORDER — VALSARTAN 80 MG/1
80 TABLET ORAL DAILY
Qty: 90 TABLET | Refills: 1 | OUTPATIENT
Start: 2025-08-27

## (undated) DEVICE — BANDAGE, COMPRESSION, W/CLIP, FLEX-MASTER, DOUBLE LENGTH, 6 IN X 11 YD, LF

## (undated) DEVICE — HOOD, STERISHIELD T4 SYSTEM

## (undated) DEVICE — SUTURE, STRATAFIX, 1 SYMMETRIC, PDS PLUS, 60CM, CTX, VIOLET

## (undated) DEVICE — GOWN, SURGICAL, NON-REINFORCED, XLARGE, LF

## (undated) DEVICE — ADHESIVE, SKIN, DERMABOND ADVANCED, 15CM, PEN-STYLE

## (undated) DEVICE — WOUND SYSTEM, DEBRIDEMENT & CLEANING, O.R DUOPAK

## (undated) DEVICE — SYRINGE, 60 CC, LUER LOCK, MONOJECT, W/O CAP, LF

## (undated) DEVICE — TIP, SUCTION, YANKAUER, FLEXIBLE

## (undated) DEVICE — GLOVE, SURGICAL, PROTEXIS PI BLUE W/NEUTHERA, 7.5, PF, LF

## (undated) DEVICE — PAD, KNEE PATIENT, DEMAYO, DISP, STERILE

## (undated) DEVICE — SUTURE, VICRYL, 2-0, 36 IN, CT-1, UNDYED

## (undated) DEVICE — SUTURE, VICRYL, 1, 27 IN, CT-1 CR, UNDYED

## (undated) DEVICE — SOLUTION, IRRIGATION, STERILE WATER, 1000 ML, POUR BOTTLE

## (undated) DEVICE — TOWEL PACK, STERILE, 4/PACK, BLUE

## (undated) DEVICE — DRESSING, MEPILEX BORDER, POST-OP AG, 4 X 12 IN

## (undated) DEVICE — MARKER, SKIN, DUAL TIP, W/RULER AND LABEL

## (undated) DEVICE — Device

## (undated) DEVICE — SUTURE, MONOCRYL, 3-0, PS-1 27IN, UNDYED

## (undated) DEVICE — INTERPULSE HANDPIECE SET W/ 10FT SUCTION TUBING

## (undated) DEVICE — DRAPE, INSTRUMENT, W/POUCH, STERI DRAPE, 7 X 11 IN, DISPOSABLE, STERILE

## (undated) DEVICE — GLOVE, SURGICAL, PROTEXIS PI W/NEU-THERA, 8.0, PF, LF

## (undated) DEVICE — NEEDLE, HYPODERMIC, SPECIALTY, REGULAR WALL, SHORT BEVEL, 18 G X 1.5 IN

## (undated) DEVICE — SOLUTION, IRRIGATION, USP, SODIUM CHLORIDE 0.9%, 3000 ML, BAG

## (undated) DEVICE — MIXER, CEMENT, MIXEVAC III HIGH VACUUM KIT, STERILE

## (undated) DEVICE — GLOVE, SURGICAL, PROTEXIS PI , 7.5, PF, LF

## (undated) DEVICE — BLADE, STRYKER, OSC, 19 X 90 X 1.27G

## (undated) DEVICE — SUTURE, VICRYL PLUS, 1, 8X27IN, CT-1, CR, UND, BRAIDED

## (undated) DEVICE — GLOVE, SURGICAL, PROTEXIS PI BLUE W/NEUTHERA, 8.0, PF, LF